# Patient Record
Sex: FEMALE | Race: WHITE | Employment: OTHER | URBAN - METROPOLITAN AREA
[De-identification: names, ages, dates, MRNs, and addresses within clinical notes are randomized per-mention and may not be internally consistent; named-entity substitution may affect disease eponyms.]

---

## 2017-01-05 ENCOUNTER — TELEPHONE (OUTPATIENT)
Dept: FAMILY MEDICINE CLINIC | Age: 77
End: 2017-01-05

## 2017-01-07 RX ORDER — CHOLESTYRAMINE 4 G/9G
1 POWDER, FOR SUSPENSION ORAL
Qty: 90 PACKET | Refills: 3 | Status: SHIPPED | OUTPATIENT
Start: 2017-01-07 | End: 2017-02-09 | Stop reason: ALTCHOICE

## 2017-01-09 ENCOUNTER — TELEPHONE (OUTPATIENT)
Dept: FAMILY MEDICINE CLINIC | Age: 77
End: 2017-01-09

## 2017-02-01 ENCOUNTER — TELEPHONE (OUTPATIENT)
Dept: FAMILY MEDICINE CLINIC | Age: 77
End: 2017-02-01

## 2017-02-02 ENCOUNTER — HOSPITAL ENCOUNTER (OUTPATIENT)
Dept: LAB | Age: 77
Discharge: HOME | End: 2017-02-02
Payer: MEDICARE

## 2017-02-02 DIAGNOSIS — E03.9 HYPOTHYROIDISM, UNSPECIFIED TYPE: ICD-10-CM

## 2017-02-02 DIAGNOSIS — E87.6 HYPOKALEMIA: ICD-10-CM

## 2017-02-02 LAB
ANION GAP SERPL CALCULATED.3IONS-SCNC: 10 MEQ/L (ref 7–13)
BUN BLDV-MCNC: 21 MG/DL (ref 8–23)
CALCIUM SERPL-MCNC: 10.2 MG/DL (ref 8.6–10.2)
CHLORIDE BLD-SCNC: 101 MEQ/L (ref 98–107)
CO2: 28 MEQ/L (ref 22–29)
CREAT SERPL-MCNC: 0.7 MG/DL (ref 0.5–0.9)
GFR AFRICAN AMERICAN: >60
GFR NON-AFRICAN AMERICAN: >60
GLUCOSE BLD-MCNC: 101 MG/DL (ref 74–109)
POTASSIUM SERPL-SCNC: 3.9 MEQ/L (ref 3.5–5.1)
SODIUM BLD-SCNC: 139 MEQ/L (ref 132–144)
TSH SERPL DL<=0.05 MIU/L-ACNC: 4.86 UIU/ML (ref 0.27–4.2)

## 2017-02-02 PROCEDURE — 36415 COLL VENOUS BLD VENIPUNCTURE: CPT

## 2017-02-02 PROCEDURE — 84443 ASSAY THYROID STIM HORMONE: CPT

## 2017-02-02 PROCEDURE — 80048 BASIC METABOLIC PNL TOTAL CA: CPT

## 2017-02-03 DIAGNOSIS — E03.9 HYPOTHYROIDISM, UNSPECIFIED TYPE: Primary | Chronic | ICD-10-CM

## 2017-02-03 RX ORDER — LEVOTHYROXINE SODIUM 0.05 MG/1
50 TABLET ORAL DAILY
Qty: 30 TABLET | Refills: 3 | Status: SHIPPED | OUTPATIENT
Start: 2017-02-03 | End: 2017-06-05 | Stop reason: SDUPTHER

## 2017-02-09 ENCOUNTER — OFFICE VISIT (OUTPATIENT)
Dept: FAMILY MEDICINE CLINIC | Age: 77
End: 2017-02-09

## 2017-02-09 VITALS
DIASTOLIC BLOOD PRESSURE: 80 MMHG | RESPIRATION RATE: 18 BRPM | HEART RATE: 112 BPM | HEIGHT: 61 IN | SYSTOLIC BLOOD PRESSURE: 132 MMHG | TEMPERATURE: 98.1 F | OXYGEN SATURATION: 98 %

## 2017-02-09 DIAGNOSIS — Z12.31 VISIT FOR SCREENING MAMMOGRAM: ICD-10-CM

## 2017-02-09 DIAGNOSIS — Z78.0 POSTMENOPAUSAL: ICD-10-CM

## 2017-02-09 DIAGNOSIS — K21.9 GASTROESOPHAGEAL REFLUX DISEASE, ESOPHAGITIS PRESENCE NOT SPECIFIED: Chronic | ICD-10-CM

## 2017-02-09 DIAGNOSIS — E03.9 HYPOTHYROIDISM, UNSPECIFIED TYPE: Chronic | ICD-10-CM

## 2017-02-09 DIAGNOSIS — I10 ESSENTIAL HYPERTENSION: Primary | Chronic | ICD-10-CM

## 2017-02-09 DIAGNOSIS — G47.00 INSOMNIA, UNSPECIFIED TYPE: Chronic | ICD-10-CM

## 2017-02-09 DIAGNOSIS — Z13.820 SCREENING FOR OSTEOPOROSIS: ICD-10-CM

## 2017-02-09 DIAGNOSIS — M15.9 GENERALIZED OSTEOARTHRITIS: Chronic | ICD-10-CM

## 2017-02-09 DIAGNOSIS — E78.5 HYPERLIPIDEMIA, UNSPECIFIED HYPERLIPIDEMIA TYPE: Chronic | ICD-10-CM

## 2017-02-09 PROCEDURE — 99214 OFFICE O/P EST MOD 30 MIN: CPT | Performed by: FAMILY MEDICINE

## 2017-02-09 PROCEDURE — 3288F FALL RISK ASSESSMENT DOCD: CPT | Performed by: FAMILY MEDICINE

## 2017-02-09 PROCEDURE — G8510 SCR DEP NEG, NO PLAN REQD: HCPCS | Performed by: FAMILY MEDICINE

## 2017-02-09 RX ORDER — CYCLOBENZAPRINE HCL 10 MG
TABLET ORAL
Refills: 0 | COMMUNITY
Start: 2017-01-26 | End: 2017-08-02

## 2017-02-09 RX ORDER — MIRTAZAPINE 7.5 MG/1
7.5 TABLET, FILM COATED ORAL NIGHTLY
Qty: 30 TABLET | Refills: 3 | Status: SHIPPED | OUTPATIENT
Start: 2017-02-09 | End: 2017-08-02

## 2017-02-09 ASSESSMENT — PATIENT HEALTH QUESTIONNAIRE - PHQ9
SUM OF ALL RESPONSES TO PHQ QUESTIONS 1-9: 0
1. LITTLE INTEREST OR PLEASURE IN DOING THINGS: 0
2. FEELING DOWN, DEPRESSED OR HOPELESS: 0
SUM OF ALL RESPONSES TO PHQ9 QUESTIONS 1 & 2: 0

## 2017-02-09 ASSESSMENT — ENCOUNTER SYMPTOMS
BLOOD IN STOOL: 0
COUGH: 0
ABDOMINAL PAIN: 0
VOMITING: 0
CHEST TIGHTNESS: 0
SHORTNESS OF BREATH: 0
NAUSEA: 0
WHEEZING: 0
DIARRHEA: 0

## 2017-03-31 ENCOUNTER — HOSPITAL ENCOUNTER (OUTPATIENT)
Dept: LAB | Age: 77
Discharge: HOME | End: 2017-03-31
Payer: MEDICARE

## 2017-03-31 DIAGNOSIS — E78.5 HYPERLIPIDEMIA, UNSPECIFIED HYPERLIPIDEMIA TYPE: Chronic | ICD-10-CM

## 2017-03-31 DIAGNOSIS — E03.9 HYPOTHYROIDISM, UNSPECIFIED TYPE: Chronic | ICD-10-CM

## 2017-03-31 LAB
CHOLESTEROL, TOTAL: 232 MG/DL (ref 0–199)
HDLC SERPL-MCNC: 55 MG/DL (ref 40–59)
LDL CHOLESTEROL CALCULATED: 145 MG/DL (ref 0–129)
TRIGL SERPL-MCNC: 160 MG/DL (ref 0–200)
TSH SERPL DL<=0.05 MIU/L-ACNC: 3.15 UIU/ML (ref 0.27–4.2)

## 2017-03-31 PROCEDURE — 36415 COLL VENOUS BLD VENIPUNCTURE: CPT

## 2017-03-31 PROCEDURE — 80061 LIPID PANEL: CPT

## 2017-03-31 PROCEDURE — 84443 ASSAY THYROID STIM HORMONE: CPT

## 2017-06-05 DIAGNOSIS — E03.9 HYPOTHYROIDISM, UNSPECIFIED TYPE: Chronic | ICD-10-CM

## 2017-06-05 RX ORDER — LEVOTHYROXINE SODIUM 0.05 MG/1
50 TABLET ORAL DAILY
Qty: 90 TABLET | Refills: 3 | Status: SHIPPED | OUTPATIENT
Start: 2017-06-05 | End: 2018-06-22 | Stop reason: SDUPTHER

## 2017-07-07 ENCOUNTER — HOSPITAL ENCOUNTER (OUTPATIENT)
Dept: WOMENS IMAGING | Age: 77
Discharge: HOME | End: 2017-07-07
Payer: MEDICARE

## 2017-07-07 DIAGNOSIS — Z78.0 POSTMENOPAUSAL: ICD-10-CM

## 2017-07-07 DIAGNOSIS — Z13.820 SCREENING FOR OSTEOPOROSIS: ICD-10-CM

## 2017-07-07 DIAGNOSIS — Z12.31 VISIT FOR SCREENING MAMMOGRAM: ICD-10-CM

## 2017-07-07 PROCEDURE — 77080 DXA BONE DENSITY AXIAL: CPT

## 2017-07-07 PROCEDURE — G0202 SCR MAMMO BI INCL CAD: HCPCS

## 2017-07-09 DIAGNOSIS — R92.8 ABNORMALITY OF RIGHT BREAST ON SCREENING MAMMOGRAM: Primary | ICD-10-CM

## 2017-07-13 ENCOUNTER — TELEPHONE (OUTPATIENT)
Dept: FAMILY MEDICINE CLINIC | Age: 77
End: 2017-07-13

## 2017-07-17 ENCOUNTER — HOSPITAL ENCOUNTER (OUTPATIENT)
Dept: ULTRASOUND IMAGING | Age: 77
Discharge: HOME | End: 2017-07-17
Payer: MEDICARE

## 2017-07-17 ENCOUNTER — HOSPITAL ENCOUNTER (OUTPATIENT)
Dept: WOMENS IMAGING | Age: 77
Discharge: HOME | End: 2017-07-17
Payer: MEDICARE

## 2017-07-17 DIAGNOSIS — R92.8 ABNORMALITY OF RIGHT BREAST ON SCREENING MAMMOGRAM: ICD-10-CM

## 2017-07-17 PROCEDURE — 76642 ULTRASOUND BREAST LIMITED: CPT

## 2017-07-17 PROCEDURE — G0206 DX MAMMO INCL CAD UNI: HCPCS

## 2017-07-18 DIAGNOSIS — N63.10 MASS OF RIGHT BREAST ON MAMMOGRAM: Primary | ICD-10-CM

## 2017-08-02 ENCOUNTER — OFFICE VISIT (OUTPATIENT)
Dept: SURGERY | Age: 77
End: 2017-08-02

## 2017-08-02 VITALS
WEIGHT: 195 LBS | BODY MASS INDEX: 35.88 KG/M2 | DIASTOLIC BLOOD PRESSURE: 82 MMHG | SYSTOLIC BLOOD PRESSURE: 140 MMHG | HEIGHT: 62 IN | TEMPERATURE: 98.1 F

## 2017-08-02 DIAGNOSIS — R92.8 ABNORMAL MAMMOGRAM: ICD-10-CM

## 2017-08-02 DIAGNOSIS — R92.8 ABNORMAL ULTRASOUND OF BREAST: Primary | ICD-10-CM

## 2017-08-02 PROCEDURE — 99213 OFFICE O/P EST LOW 20 MIN: CPT | Performed by: SURGERY

## 2017-08-02 RX ORDER — MELOXICAM 15 MG/1
TABLET ORAL
Refills: 0 | COMMUNITY
Start: 2017-07-18 | End: 2017-12-18 | Stop reason: ALTCHOICE

## 2017-08-02 RX ORDER — OXYCODONE HYDROCHLORIDE AND ACETAMINOPHEN 5; 325 MG/1; MG/1
TABLET ORAL
Refills: 0 | COMMUNITY
Start: 2017-07-06 | End: 2018-04-19 | Stop reason: ALTCHOICE

## 2017-08-10 ENCOUNTER — PREP FOR PROCEDURE (OUTPATIENT)
Dept: WOMENS IMAGING | Age: 77
End: 2017-08-10

## 2017-08-10 ENCOUNTER — HOSPITAL ENCOUNTER (OUTPATIENT)
Dept: WOMENS IMAGING | Age: 77
Discharge: HOME | End: 2017-08-10
Payer: MEDICARE

## 2017-08-10 ENCOUNTER — HOSPITAL ENCOUNTER (OUTPATIENT)
Dept: ULTRASOUND IMAGING | Age: 77
Discharge: HOME | End: 2017-08-10
Payer: MEDICARE

## 2017-08-10 VITALS
RESPIRATION RATE: 16 BRPM | HEART RATE: 96 BPM | WEIGHT: 195 LBS | SYSTOLIC BLOOD PRESSURE: 162 MMHG | HEIGHT: 62 IN | OXYGEN SATURATION: 96 % | DIASTOLIC BLOOD PRESSURE: 90 MMHG | BODY MASS INDEX: 35.88 KG/M2

## 2017-08-10 DIAGNOSIS — R92.8 ABNORMAL ULTRASOUND OF BREAST: ICD-10-CM

## 2017-08-10 PROCEDURE — G0206 DX MAMMO INCL CAD UNI: HCPCS

## 2017-08-10 PROCEDURE — 2580000003 HC RX 258: Performed by: SURGERY

## 2017-08-10 PROCEDURE — 88305 TISSUE EXAM BY PATHOLOGIST: CPT

## 2017-08-10 PROCEDURE — 2500000003 HC RX 250 WO HCPCS: Performed by: SURGERY

## 2017-08-10 PROCEDURE — 19083 BX BREAST 1ST LESION US IMAG: CPT | Performed by: SURGERY

## 2017-08-10 PROCEDURE — 19083 BX BREAST 1ST LESION US IMAG: CPT

## 2017-08-10 RX ORDER — SODIUM CHLORIDE 9 MG/ML
INJECTION, SOLUTION INTRAVENOUS CONTINUOUS
Status: CANCELLED | OUTPATIENT
Start: 2017-08-10

## 2017-08-10 RX ORDER — LIDOCAINE HYDROCHLORIDE 20 MG/ML
20 INJECTION, SOLUTION INFILTRATION; PERINEURAL ONCE
Status: CANCELLED | OUTPATIENT
Start: 2017-08-10 | End: 2017-08-10

## 2017-08-10 RX ORDER — SODIUM CHLORIDE 9 MG/ML
INJECTION, SOLUTION INTRAVENOUS CONTINUOUS
Status: DISCONTINUED | OUTPATIENT
Start: 2017-08-10 | End: 2017-08-13 | Stop reason: HOSPADM

## 2017-08-10 RX ORDER — LIDOCAINE HYDROCHLORIDE 20 MG/ML
20 INJECTION, SOLUTION INFILTRATION; PERINEURAL ONCE
Status: COMPLETED | OUTPATIENT
Start: 2017-08-10 | End: 2017-08-10

## 2017-08-10 RX ADMIN — SODIUM CHLORIDE 250 ML: 9 INJECTION, SOLUTION INTRAVENOUS at 14:11

## 2017-08-10 RX ADMIN — SODIUM CHLORIDE 250 ML: 9 INJECTION, SOLUTION INTRAVENOUS at 14:00

## 2017-08-10 RX ADMIN — LIDOCAINE HYDROCHLORIDE 10 ML: 20 INJECTION, SOLUTION INFILTRATION; PERINEURAL at 14:11

## 2017-08-10 ASSESSMENT — PAIN DESCRIPTION - DESCRIPTORS
DESCRIPTORS: ACHING
DESCRIPTORS: ACHING

## 2017-08-10 ASSESSMENT — PAIN DESCRIPTION - PAIN TYPE: TYPE: CHRONIC PAIN

## 2017-08-10 ASSESSMENT — PAIN DESCRIPTION - LOCATION: LOCATION: KNEE

## 2017-08-10 ASSESSMENT — PAIN DESCRIPTION - ORIENTATION: ORIENTATION: RIGHT

## 2017-08-10 ASSESSMENT — PAIN - FUNCTIONAL ASSESSMENT: PAIN_FUNCTIONAL_ASSESSMENT: 0-10

## 2017-08-10 ASSESSMENT — PAIN DESCRIPTION - ONSET: ONSET: ON-GOING

## 2017-08-10 ASSESSMENT — PAIN SCALES - GENERAL: PAINLEVEL_OUTOF10: 3

## 2017-08-10 ASSESSMENT — PAIN DESCRIPTION - PROGRESSION: CLINICAL_PROGRESSION: NOT CHANGED

## 2017-08-10 ASSESSMENT — PAIN DESCRIPTION - FREQUENCY: FREQUENCY: INTERMITTENT

## 2017-08-18 DIAGNOSIS — R92.8 ABNORMAL ULTRASOUND OF BREAST: Primary | ICD-10-CM

## 2017-08-18 DIAGNOSIS — R92.8 ABNORMAL MAMMOGRAM: ICD-10-CM

## 2017-09-12 ENCOUNTER — HOSPITAL ENCOUNTER (OUTPATIENT)
Dept: PHYSICAL THERAPY | Age: 77
Setting detail: THERAPIES SERIES
Discharge: HOME | End: 2017-09-12
Payer: MEDICARE

## 2017-09-12 PROCEDURE — G8978 MOBILITY CURRENT STATUS: HCPCS

## 2017-09-12 PROCEDURE — 97162 PT EVAL MOD COMPLEX 30 MIN: CPT

## 2017-09-12 PROCEDURE — G8979 MOBILITY GOAL STATUS: HCPCS

## 2017-09-12 ASSESSMENT — PAIN DESCRIPTION - PROGRESSION: CLINICAL_PROGRESSION: GRADUALLY WORSENING

## 2017-09-12 ASSESSMENT — PAIN DESCRIPTION - FREQUENCY: FREQUENCY: INTERMITTENT

## 2017-09-12 ASSESSMENT — PAIN DESCRIPTION - ORIENTATION: ORIENTATION: LEFT;RIGHT

## 2017-09-12 ASSESSMENT — PAIN SCALES - GENERAL: PAINLEVEL_OUTOF10: 8

## 2017-09-12 ASSESSMENT — PAIN DESCRIPTION - DESCRIPTORS: DESCRIPTORS: ACHING

## 2017-09-12 ASSESSMENT — PAIN DESCRIPTION - LOCATION: LOCATION: BACK;KNEE

## 2017-09-12 ASSESSMENT — PAIN DESCRIPTION - PAIN TYPE: TYPE: CHRONIC PAIN

## 2017-09-14 ENCOUNTER — HOSPITAL ENCOUNTER (OUTPATIENT)
Dept: PHYSICAL THERAPY | Age: 77
Setting detail: THERAPIES SERIES
Discharge: HOME | End: 2017-09-14
Payer: MEDICARE

## 2017-09-14 PROCEDURE — 97113 AQUATIC THERAPY/EXERCISES: CPT

## 2017-09-14 ASSESSMENT — PAIN DESCRIPTION - DESCRIPTORS: DESCRIPTORS: ACHING

## 2017-09-14 ASSESSMENT — PAIN DESCRIPTION - LOCATION: LOCATION: LEG;KNEE

## 2017-09-14 ASSESSMENT — PAIN DESCRIPTION - ORIENTATION: ORIENTATION: RIGHT;LEFT

## 2017-09-14 ASSESSMENT — PAIN SCALES - GENERAL: PAINLEVEL_OUTOF10: 6

## 2017-09-14 ASSESSMENT — PAIN DESCRIPTION - PAIN TYPE: TYPE: CHRONIC PAIN

## 2017-09-19 ENCOUNTER — HOSPITAL ENCOUNTER (OUTPATIENT)
Dept: PHYSICAL THERAPY | Age: 77
Setting detail: THERAPIES SERIES
Discharge: HOME | End: 2017-09-19
Payer: MEDICARE

## 2017-09-19 PROCEDURE — 97113 AQUATIC THERAPY/EXERCISES: CPT

## 2017-09-19 ASSESSMENT — PAIN DESCRIPTION - ORIENTATION: ORIENTATION: LOWER;LEFT

## 2017-09-19 ASSESSMENT — PAIN DESCRIPTION - DESCRIPTORS: DESCRIPTORS: ACHING

## 2017-09-19 ASSESSMENT — PAIN DESCRIPTION - LOCATION: LOCATION: BACK;LEG

## 2017-09-19 ASSESSMENT — PAIN SCALES - GENERAL: PAINLEVEL_OUTOF10: 7

## 2017-09-21 ENCOUNTER — HOSPITAL ENCOUNTER (OUTPATIENT)
Dept: PHYSICAL THERAPY | Age: 77
Setting detail: THERAPIES SERIES
Discharge: HOME | End: 2017-09-21
Payer: MEDICARE

## 2017-09-21 PROCEDURE — 97113 AQUATIC THERAPY/EXERCISES: CPT

## 2017-09-21 ASSESSMENT — PAIN DESCRIPTION - LOCATION: LOCATION: BACK

## 2017-09-21 ASSESSMENT — PAIN DESCRIPTION - PAIN TYPE: TYPE: CHRONIC PAIN

## 2017-09-21 ASSESSMENT — PAIN DESCRIPTION - ORIENTATION: ORIENTATION: LOWER;LEFT

## 2017-09-21 ASSESSMENT — PAIN DESCRIPTION - FREQUENCY: FREQUENCY: INTERMITTENT

## 2017-09-21 ASSESSMENT — PAIN DESCRIPTION - DESCRIPTORS: DESCRIPTORS: ACHING

## 2017-09-21 ASSESSMENT — PAIN SCALES - GENERAL: PAINLEVEL_OUTOF10: 4

## 2017-09-26 ENCOUNTER — HOSPITAL ENCOUNTER (OUTPATIENT)
Dept: PHYSICAL THERAPY | Age: 77
Setting detail: THERAPIES SERIES
Discharge: HOME | End: 2017-09-26
Payer: MEDICARE

## 2017-09-26 PROCEDURE — 97113 AQUATIC THERAPY/EXERCISES: CPT

## 2017-09-26 ASSESSMENT — PAIN DESCRIPTION - ORIENTATION: ORIENTATION: RIGHT;LEFT

## 2017-09-26 ASSESSMENT — PAIN DESCRIPTION - PAIN TYPE: TYPE: CHRONIC PAIN

## 2017-09-26 ASSESSMENT — PAIN DESCRIPTION - LOCATION: LOCATION: LEG

## 2017-09-26 ASSESSMENT — PAIN SCALES - GENERAL: PAINLEVEL_OUTOF10: 6

## 2017-09-29 ENCOUNTER — HOSPITAL ENCOUNTER (OUTPATIENT)
Dept: PHYSICAL THERAPY | Age: 77
Setting detail: THERAPIES SERIES
Discharge: HOME | End: 2017-09-29
Payer: MEDICARE

## 2017-09-29 PROCEDURE — 97113 AQUATIC THERAPY/EXERCISES: CPT

## 2017-09-29 ASSESSMENT — PAIN DESCRIPTION - LOCATION: LOCATION: LEG;BACK

## 2017-09-29 ASSESSMENT — PAIN DESCRIPTION - DESCRIPTORS: DESCRIPTORS: ACHING

## 2017-09-29 ASSESSMENT — PAIN DESCRIPTION - FREQUENCY: FREQUENCY: INTERMITTENT

## 2017-09-29 ASSESSMENT — PAIN DESCRIPTION - ORIENTATION: ORIENTATION: LEFT;RIGHT;LOWER

## 2017-09-29 ASSESSMENT — PAIN DESCRIPTION - PAIN TYPE: TYPE: CHRONIC PAIN

## 2017-09-29 ASSESSMENT — PAIN SCALES - GENERAL: PAINLEVEL_OUTOF10: 6

## 2017-10-03 ENCOUNTER — HOSPITAL ENCOUNTER (OUTPATIENT)
Dept: PHYSICAL THERAPY | Age: 77
Setting detail: THERAPIES SERIES
Discharge: HOME | End: 2017-10-03
Payer: MEDICARE

## 2017-10-03 PROCEDURE — 97113 AQUATIC THERAPY/EXERCISES: CPT

## 2017-10-03 ASSESSMENT — PAIN DESCRIPTION - LOCATION: LOCATION: LEG;KNEE

## 2017-10-03 ASSESSMENT — PAIN DESCRIPTION - DESCRIPTORS: DESCRIPTORS: ACHING

## 2017-10-03 ASSESSMENT — PAIN DESCRIPTION - PAIN TYPE: TYPE: CHRONIC PAIN

## 2017-10-03 ASSESSMENT — PAIN DESCRIPTION - ORIENTATION: ORIENTATION: LEFT;RIGHT

## 2017-10-03 ASSESSMENT — PAIN SCALES - GENERAL: PAINLEVEL_OUTOF10: 6

## 2017-10-05 ENCOUNTER — HOSPITAL ENCOUNTER (OUTPATIENT)
Dept: PHYSICAL THERAPY | Age: 77
Setting detail: THERAPIES SERIES
Discharge: HOME | End: 2017-10-05
Payer: MEDICARE

## 2017-10-05 PROCEDURE — 97113 AQUATIC THERAPY/EXERCISES: CPT

## 2017-10-05 ASSESSMENT — PAIN DESCRIPTION - LOCATION: LOCATION: KNEE

## 2017-10-05 ASSESSMENT — PAIN DESCRIPTION - PAIN TYPE: TYPE: CHRONIC PAIN

## 2017-10-05 ASSESSMENT — PAIN DESCRIPTION - ORIENTATION: ORIENTATION: LEFT;RIGHT

## 2017-10-05 ASSESSMENT — PAIN SCALES - GENERAL: PAINLEVEL_OUTOF10: 2

## 2017-10-05 NOTE — PROGRESS NOTES
[address] [address]  Outpatient Physical Therapy    Treatment Note  Date: 10/5/2017  Patient: Angel Meyer  : 1940  ACCT #: [de-identified]  Referring Practitioner: Dr Sachin Pelaez   Diagnosis: L knee pain, LBP    Visit Information:  PT Visit Information  Onset Date: 17  PT Insurance Information: [employer] Medicare  Total # of Visits to Date: 8  Plan of Care/Certification Expiration Date: 17  No Show: 0  Canceled Appointment: 0  Progress Note Counter: 8/10    Subjective: Pt states \"I've had a good morning. \" Pt reports walking around [employer] ~20 min prior to PT appt. Pt expresses not having any pain w/ using shopping cart as AD. HEP Compliance:  [x] Good [] Fair [] Poor [] Reports not doing due to:    Vital Signs  Patient Currently in Pain: Yes   Pain Screening  Patient Currently in Pain: Yes  Pain Assessment  Pain Level: 2 (2-3)  Pain Type: Chronic pain  Pain Location: Knee  Pain Orientation: Left;Right    OBJECTIVE:   Exercises  Exercise 1: See paper PRE for ex's. To be scanned to chart upon discharge. Strength: [x] NT  [] MMT completed:     ROM: [x] NT  [] ROM measurements:    Assessment: Body structures, Functions, Activity limitations: Decreased functional mobility , Decreased ADL status, Decreased ROM, Decreased strength, Decreased endurance, Decreased balance  Assessment: Good antonette to tx this date w/ pain decreaseing to 2/10 in pool. Pt expressing only discomfort during reverse hip circles on Lt. Pt encouraged to decrease range to smaller circles for improved antonette. Pt requested to perform DB hang w/o DB's and instead holding onto ladder. Treatment Diagnosis: LBP and L > R knee pain with strength and ROM deficits  Prognosis: Good     Goals:  Short term goals  Time Frame for Short term goals: 2 weeks  Short term goal 1: Decrease pain 50% to assist with improved functional gains. Short term goal 2: Initiate written HEP for symptom management.   Long term goals  Time Frame for

## 2017-10-10 ENCOUNTER — HOSPITAL ENCOUNTER (OUTPATIENT)
Dept: PHYSICAL THERAPY | Age: 77
Setting detail: THERAPIES SERIES
Discharge: HOME | End: 2017-10-10
Payer: MEDICARE

## 2017-10-10 PROCEDURE — 97113 AQUATIC THERAPY/EXERCISES: CPT

## 2017-10-10 ASSESSMENT — PAIN SCALES - GENERAL: PAINLEVEL_OUTOF10: 5

## 2017-10-10 ASSESSMENT — PAIN DESCRIPTION - DESCRIPTORS: DESCRIPTORS: ACHING

## 2017-10-10 ASSESSMENT — PAIN DESCRIPTION - ORIENTATION: ORIENTATION: LEFT;RIGHT

## 2017-10-10 ASSESSMENT — PAIN DESCRIPTION - PAIN TYPE: TYPE: CHRONIC PAIN

## 2017-10-10 ASSESSMENT — PAIN DESCRIPTION - LOCATION: LOCATION: KNEE

## 2017-10-12 ENCOUNTER — HOSPITAL ENCOUNTER (OUTPATIENT)
Dept: PHYSICAL THERAPY | Age: 77
Setting detail: THERAPIES SERIES
Discharge: HOME | End: 2017-10-12
Payer: MEDICARE

## 2017-10-12 PROCEDURE — G8979 MOBILITY GOAL STATUS: HCPCS

## 2017-10-12 PROCEDURE — 97113 AQUATIC THERAPY/EXERCISES: CPT

## 2017-10-12 PROCEDURE — G8980 MOBILITY D/C STATUS: HCPCS

## 2017-10-12 PROCEDURE — 97110 THERAPEUTIC EXERCISES: CPT

## 2017-10-12 NOTE — PROGRESS NOTES
[address] [address]  Outpatient Physical Therapy    Treatment Note        Date: 10/12/2017  Patient: Ángel Shipman  : 1940  ACCT #: [de-identified]  Referring Practitioner: Dr Stephanie Alvarado   Diagnosis: L knee pain, LBP    Visit Information:  PT Visit Information  Onset Date: 17  PT Insurance Information: [employer] Medicare  Total # of Visits to Date: 10  Plan of Care/Certification Expiration Date: 17  No Show: 0  Canceled Appointment: 0  Progress Note Counter: 10/10    Subjective: Pt reports 6-7/10 pain in L knee, 4-5/10 pain in R knee, and 6/10 pain in LB and L buttock. Pt reports only being able to walk w/ cane or cart when ambulating in home or community and must go at a slowed pace and \"cannot go very far. \" Pt reports she in unable to don L sock and must use a sock aid. pt expressing \"my left knee only seems to be getting worse. \"     HEP Compliance:  [x] Good [] Fair [] Poor [] Reports not doing due to:    OBJECTIVE:   Exercises  Exercise 1: See paper PRE for ex's. To be scanned to chart upon discharge. Exercise 2: Objective measurements: 20 minutes    Strength: [] NT  [x] MMT completed:  Strength RLE  Comment: Ankle: 5/5, Knee: 4+/5, Hip: flex 4-/5, ext 4-/5 (standing) IR/ER unable to antonette d/t increased pain  Strength LLE  Comment: Ankle: 5/5 Knee: 4+/5 Hip: flex: 4/5, ext 4-/5 (standing) IR/ER unable to antonette d/t increased pain     ROM: [] NT  [x] ROM measurements:  Spine  Lumbar: Flex WNL no pain, Ext neutral only w/ no pain, SB limited to patella b/l w/ no increase in pain    *Indicates exercise, modality, or manual techniques to be initiated when appropriate    Assessment: Body structures, Functions, Activity limitations: Decreased functional mobility , Decreased ADL status, Decreased ROM, Decreased strength, Decreased endurance, Decreased balance  Assessment: despite increase in strength in b/l LEs, pt reporting increased pain in b/l LEs, L buttock, and LB.  Discussed pt returning to MD to discuss any further tx options d/t increasing pain and decreased mobility. Treatment Diagnosis: LBP and L > R knee pain with strength and ROM deficits  Prognosis: Good     Goals:  Short term goals  Time Frame for Short term goals: 2 weeks  Short term goal 1: Decrease pain 50% to assist with improved functional gains. Short term goal 2: Initiate written HEP for symptom management. Long term goals  Time Frame for Long term goals : 4 weeks  Long term goal 1: Decrease Lumbar/ B knee pain to  <2/10 to enable functional improvements in ADL's. Long term goal 2: Pain-free  AROM to [facility] Lumbar ext allowing an increase in ADL tolerance. Long term goal 3: Improve  strength B LE strength 3+ to 4-/5 to allow patient to report greater than 50% normal function per functional survey score. Long term goal 4: Pt will amb all surfaces short community distances safe/Indep with least restrictive device and minimal deviations  Progress toward goals: Pt has not met the above goals d/t increased pain    POST-PAIN       Pain Rating (0-10 pain scale):   Same as above/10   Location and pain description same as pre-treatment unless indicated. Action: [] NA   [x] Perform HEP  [] Meds as prescribed  [x] Modalities as prescribed   [x] Call Physician     Frequency/Duration:  Plan  Plan Comment: D/C PT     Pt to continue current HEP. See objective section for any therapeutic exercise changes, additions or modifications this date.     PT Individual Minutes  Time In: 1300  Time Out: 1350  Minutes: 50  Timed Code Treatment Minutes: 50 Minutes  Procedure Minutes:0    Signature:  Electronically signed by Cody Hawley PTA on 10/12/17 at 7:31 PM

## 2017-10-24 ENCOUNTER — HOSPITAL ENCOUNTER (OUTPATIENT)
Dept: OCCUPATIONAL THERAPY | Age: 77
Setting detail: THERAPIES SERIES
Discharge: HOME | End: 2017-10-24
Payer: MEDICARE

## 2017-10-24 PROCEDURE — G8990 OTHER PT/OT CURRENT STATUS: HCPCS

## 2017-10-24 PROCEDURE — G8991 OTHER PT/OT GOAL STATUS: HCPCS

## 2017-10-24 PROCEDURE — 97166 OT EVAL MOD COMPLEX 45 MIN: CPT

## 2017-10-24 NOTE — PROGRESS NOTES
[facility] CHRONIC PAIN EVALUATION                                                                                                                   DATE: 10/24/2017                                                                   PHYSICIAN: Saniya Benitez                                                                      PATIENT NAME; Cassia Read                                              DIAGNOSIS: L knee and thigh pain and Lumbar DDD and associated pain    MRN: [id number]    ACCOUNT#: [de-identified]                                                     HDD:5/53/5484                     (79 y.o.)                          MEDICATIONS:TRimterene HCL/BP drug,Levothyroxine 50Mcg thyroid, Meloxicam 15mg, Didofenac Sodium topical gel    GENDER:  female                PRECAUTIONS:        None noted                                                       HAND DOMINANCE: L handed    PRIOR LEVEL OF FUNCTION:        Independent with a great deal of extra time required for daily activities                               DATE OF SX OR INJURY multiple painful sites and postural holding patterns    PREVIOUS/CURRENT TREATMENT FOR SAME PROBLEM:    [x]   YES []     NO Recently had PT at [address] [place] [address] office and water therapy that increased vs decreased pain  CHIEF COMPLAINT: L knee pain, back pain, and standing tolerance of 5-7 min intervals. Pain in the thigh muscles      PERTINENT MEDICAL HISTORY: Pt reports 17 year history of arthritis and L TKR 8-9 years ago, 2 hernia operations with mesh in place approx 4-5 years. 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 . Pt had skin ulcer with MRSA and had IV antibiotics with successful resolution.  Lumbar DDD      SOCIAL SUPPORT: Pt  Lives at home  who has COPD      ADL/HOBBIES/ROUTINES: Pt is Independent with ADLs with extra victoria so pain is the primary issue vs self care. Pt's  does laundry secondary to Pt's pain level and need to use B UE support to safely go up and down stairs. Pt gave up bowling because of LE pain. Pt has \"card group\" and \"second [address] with [address]      FALLS: see falls risk assessment form      PAIN SCALE:                       5  /10             Pt takes percocet at night         LOCATION: L hip , knee and thigh      POSTURAL EXAM/COMPENSATION: Pt cannot stand without cane support the patient tends to furniture walking by using hand support . Pt very unsteady on feet  Pt demonstrates an anterior pelvic tilt bilaterally and approximately a35 degree angulation of the R knee which has not had the TKR . L TKR noted and has better alignment bu t currently increased pain. Pt noted to stand with primary weight on the R LE even with the extreme angulation. LUMBAR/SACROILIAC ASSESSMENT: severe B anterior pelvic tilt with R to L obliquity present both in supine and standing. Pain in posterior L moe/SI area      LEG LENGTH COMPARISON: Pt stands primarily on the R LE and in supine Pt noted to have L leg length discrepancy of approx 1/2\"      NEUROLOGICAL ASSESSMENT: WNL      SOFT TISSUE ASSESSMENT/MOBILITY: Pt has decreased soft tissue mobility throughout the anterior and posterior lumbar area. Soft tissue appears to have poor mobility in all directions      SCAR/LOCATION/MOBILITY hernia scar is deeply imbedded with decreased mobility in all directions . L TKR scar appears mobile      COMMENTS/CLINICAL IMPRESSIONS: Pt presents with a long history of OA and poor joint mobility especially of B knees and hips. Pt currently does \"furniture walk\" and has difficulty changing directions and currently uses a straight cane for mobility.  Pt does not readily recruit her postural core muscles for mobility and position changes and substitutes pulling with her arms and hands for surface mobility and positional changes in standing. R knee demonstrate severe genu valgus and Pt prefers to stand on that R leg as her L leg is 1/2' shorter functionally and likely due to severe fascial restrictions throughout LE and Hips. Pt demonstrates poor standing tolerance of 3-5 min vs her reported 5-7 min and this is not functional for daily tasks. TX PLAN: ___2__ X WEEK X __5___WEEKS OR #______10__VISITS. Complexity:       []  Low Complexity  ¨ History: Brief history including review of medical records relating to the problem  ¨ Exam: 1-3 performance Deficits  ¨ Assistance/Modification: No assistance or modifications required to perform tasks. No comorbities affecting occupational performance  [x]  Medium Complexity  ¨ History: Expanded review of medical records and additional review of physical, cognitive, or psychosocial history related to current functional performance  ¨ Exam: 3-5 performance deficits  ¨ Assistance/Modification: Min/mod assistance or modifications required to perform tasks. May have comorbidities that affect occupational performance. []  High Complexity  ¨ History: Extensive review of medical records and additional review of physical, cognitive, or psychosocial history related to current functional performance. ¨ Exam: 5 or more performance deficits  ¨ Assistance/Modification: Significant assistance or modifications required to perform tasks. Have comorbidities that affect occupational performance. LONG TERM GOALS:          1. Pt will no longer substitute hands to surfaces for mobility and balance . Pt will  Not \"furniture walk\"      2. Pt will stand for 10 min intervals prior to resting using good postural awareness and good body mechanics for personal and household tasks      3. Redcuce /eliminate functional leg length discrepancy  by reducing moderate to severe fascial restirctions  of L LE.      4. Pt will recruit neutral pelvis  to support upright standing following reduction of severe fascial restrictions. 5. Pt will follow established HEP once established          PROBLEMS;    [x]  PAIN                                             [x]   IMPAIRED SKIN/TISSUE MOBILITY               []  EDEMA                                         []    IMP AIRED SENSATION    [x]  IMPAIRED ROM                           [x]   DECREASED STRENGTH core strength     [x]  IMPAIRED FLEXIBILITY              []    IMPAIRED COORDINATION/DEXTERITY    [x]  DECREASED KNOWLEDGE       []  DECREASED USE ___UE FOR ADL/WORK         HEP                                                     ROLE PERFORMANCE    [x]    OTHER Poor standing tolerance for ADL'S, and house hold tasks, decreased postural awareness, poor mobility endurance for functional distances    TREATMENT PLAN:    []    Re-evaluation                       [x]  PROM/stretching/AAROM/AROM    [x]    Instruction written HEP         []  Desensitization    [x]    MFR techniques                   [x]  Strengthening/Graded therapeutic UE    [x]  Pain Mgmt. Tech. Activity core strength  []  Edema mgmt. Tech. []  Coordination/Dexterity retraining    [x]  Scar Mgmt. [x]  Modalities infared  []  Work Simulation Activity         []  ADL Retraining    [x]  Instruction/application of        Energy conservation, work        Simplification, joint protection,        G CODES:Current__8990 CJ____        Body mechanics                                                Goal   ____8991CI__                                                                                                                                                      G Code:   OT G-codes  Functional Limitation: Other OT primary  Other OT Primary Current Status (): At least 20 percent but less than 40 percent impaired, limited or restricted  Other OT Primary Goal Status ():  At least 1 percent but less than 20 percent impaired, limited or restricted           [address] Name:  Mira Marcus  YOB: 1940 (68 y.o.)  Gender:  female  MRN:  [id number]  Account #: [de-identified]                         Age: 0-59 = 0          60-69= 1            > 70= 2 History of Falls:   0  Falls  last 6 mo = 0    1  fall  Last  6 mo = 1   1-3 falls last 6 mo = 2 Medical History:   Parkinsons, CVA,HTN, vertigo, >4 meds, use of assistive device (1pt.for each)  Mental Status:  A & O x 3 = 0  Disoriented to person, place, or time = 2     High Risk for Falls: >8  Intermediate Risk for Falls: 4-8   Low Risk for Falls: <4    [x]  INITIAL ASSESSMENT:                                                      Date: 10-24-17                                                          Age:   1                                                         Falls: 0                                                          PMH: 2                                                          Mental: 0                                                       Total:  3                                                       *Patient 4 or younger []   Vestibular []    Signature: CANDELARIO Daniels, 10/24/2017, 5:36 PM                                                  []   REASSESSMENT:    Date:                                                          Age: Falls:                                                          PMH:                                                           Mental:                                                      Total:                                                         *Patient 4 or younger []   Vestibular []    Signature:               THERAPY TIME INDIVIDUAL                                       TIME IN 1600                                      TIME OUT 1700                                       MINUTES 60

## 2017-10-27 ENCOUNTER — HOSPITAL ENCOUNTER (OUTPATIENT)
Dept: OCCUPATIONAL THERAPY | Age: 77
Setting detail: THERAPIES SERIES
Discharge: HOME | End: 2017-10-27
Payer: MEDICARE

## 2017-10-27 PROCEDURE — 97530 THERAPEUTIC ACTIVITIES: CPT

## 2017-10-27 PROCEDURE — 97140 MANUAL THERAPY 1/> REGIONS: CPT

## 2017-10-27 ASSESSMENT — PAIN DESCRIPTION - DESCRIPTORS: DESCRIPTORS: ACHING;BURNING;CONSTANT

## 2017-10-27 ASSESSMENT — PAIN SCALES - GENERAL: PAINLEVEL_OUTOF10: 7

## 2017-10-27 ASSESSMENT — PAIN DESCRIPTION - LOCATION: LOCATION: KNEE

## 2017-10-27 ASSESSMENT — PAIN DESCRIPTION - PAIN TYPE: TYPE: CHRONIC PAIN

## 2017-10-27 ASSESSMENT — PAIN DESCRIPTION - ORIENTATION: ORIENTATION: LEFT;RIGHT

## 2017-10-27 NOTE — PROGRESS NOTES
Occupational Therapy  Daily Treatment Note  Date: 10/27/2017  Patient Name: Laurel Felty  :  1940  MRN: [id number]       Subjective Patient here on time for treatment. Additional Pertinent Hx: Eval 10/24/17 PATRICE Keith  Referring Practitioner: Dr. Apurva Chandler  Diagnosis: L knee and thigh pain, lumbar DDD, and associated pain. Total # of Visits Approved: 10  Progress Note Counter: 1 after evaluation    Patient Currently in Pain: Yes  Pain Assessment: 0-10  Pain Level: 7  Pain Type: Chronic pain  Pain Location: Knee  Pain Orientation: Left;Right  Pain Descriptors: Aching;Burning;Constant    Treatment Activities:  Patient performed sit to supine for direct treatment of MFR techniques with min A. Patient moves slowly and with pain. Lower Extremities:                                [x]      Suprapatellar & Quadriceps          [x]  Hamstrings                                [x]      Upper Quads soft tissue mob. [x]  Leg pull left                                []       Bilateral Leg pull                           []  Soft tissue mobility                                                                                                [x]  Transverse Planes          Other exercises  Other exercises 1: HEP Posterior pelvic tilts. Other exercises 2: HEP sidelying sciatica stretch, leg off bed. Assessment Patient was unable to tolerate direct gentle hands on treatment. Patient began to moan and cry in pain. The direct treatment was then stopped and patient lied on the mat. Patient eventually was mod A for supine to sit on the edge of the mat and eventually the pain came down from a 10. Discussed the timing of pain meds prior to treatment to allow direct touch. As all treatment done today is very gentle, but the patient was unable to tolerate transverse plane releases to LE's.  Patient requested any stretching she could do so I demonstrated the above two HEP's of posterior pelvic tilts, and the sidelying stretch. Post Treatment Pain Screening  Pain at present: 7  Scale Used: Numeric Score  Intervention List: Pt educated regarding timing of pain meds  Comments / Details: Patient did not take any pain medication today prior to treatment. Educated patient to take prior to treatment to allow direct treatment. Plan Continue with current POC. G-Code 10th visits     Goals  Long term goals 1-5 addressed. Time Frame for Long term goals : 2X per week, 5 weeks or 10 visits. Long term goal 1: Patient will no longer substitute hands to surfaces for mobility and balance. Patient will not furniture walk. Long term goal 2: Patient will stand for 10 minute intervals prior to resting using good postural awareness and good body mechanics for personal and household tasks. Long term goal 3: Reduce/eliminate functional leg length discrepency by reducing moderate to severe fascial restrictions of L LE. Long term goal 4: Patient will recruit neutral pelvis to support upright standing following reduction of severe fascial restrictions. Long term goal 5: Patient will follow established HEP once established. Therapy Time Patient here for the full hour. Patient rested in seated position to let the pain subside.    Individual Concurrent Group Co-treatment   Time In  2:00 pm         Time Out  2:45pm         Minutes  [address]  Electronically signed by ARLIN Pierre on 10/27/17 at 4:39 PM

## 2017-11-01 ENCOUNTER — HOSPITAL ENCOUNTER (OUTPATIENT)
Dept: OCCUPATIONAL THERAPY | Age: 77
Setting detail: THERAPIES SERIES
Discharge: HOME | End: 2017-11-01
Payer: MEDICARE

## 2017-11-01 PROCEDURE — 97140 MANUAL THERAPY 1/> REGIONS: CPT

## 2017-11-01 ASSESSMENT — PAIN DESCRIPTION - LOCATION: LOCATION: KNEE;LEG

## 2017-11-01 ASSESSMENT — PAIN DESCRIPTION - ORIENTATION: ORIENTATION: RIGHT;LEFT

## 2017-11-01 ASSESSMENT — PAIN DESCRIPTION - PAIN TYPE: TYPE: CHRONIC PAIN

## 2017-11-01 ASSESSMENT — PAIN SCALES - GENERAL: PAINLEVEL_OUTOF10: 6

## 2017-11-03 ENCOUNTER — HOSPITAL ENCOUNTER (OUTPATIENT)
Dept: OCCUPATIONAL THERAPY | Age: 77
Setting detail: THERAPIES SERIES
Discharge: HOME | End: 2017-11-03
Payer: MEDICARE

## 2017-11-03 PROCEDURE — 97140 MANUAL THERAPY 1/> REGIONS: CPT

## 2017-11-03 PROCEDURE — 97110 THERAPEUTIC EXERCISES: CPT

## 2017-11-03 ASSESSMENT — PAIN DESCRIPTION - LOCATION: LOCATION: KNEE;LEG

## 2017-11-03 ASSESSMENT — PAIN DESCRIPTION - PAIN TYPE: TYPE: CHRONIC PAIN

## 2017-11-03 ASSESSMENT — PAIN SCALES - GENERAL: PAINLEVEL_OUTOF10: 5

## 2017-11-03 ASSESSMENT — PAIN DESCRIPTION - ORIENTATION: ORIENTATION: RIGHT;LEFT

## 2017-11-03 ASSESSMENT — PAIN DESCRIPTION - DESCRIPTORS: DESCRIPTORS: ACHING;BURNING

## 2017-11-08 ENCOUNTER — HOSPITAL ENCOUNTER (OUTPATIENT)
Dept: OCCUPATIONAL THERAPY | Age: 77
Setting detail: THERAPIES SERIES
Discharge: HOME | End: 2017-11-08
Payer: MEDICARE

## 2017-11-08 PROCEDURE — 97140 MANUAL THERAPY 1/> REGIONS: CPT

## 2017-11-08 ASSESSMENT — PAIN DESCRIPTION - LOCATION: LOCATION: KNEE;LEG

## 2017-11-08 ASSESSMENT — PAIN DESCRIPTION - PAIN TYPE: TYPE: CHRONIC PAIN

## 2017-11-08 ASSESSMENT — PAIN DESCRIPTION - ORIENTATION: ORIENTATION: LEFT

## 2017-11-08 ASSESSMENT — PAIN DESCRIPTION - DESCRIPTORS: DESCRIPTORS: ACHING;BURNING;CONSTANT

## 2017-11-08 ASSESSMENT — PAIN SCALES - GENERAL: PAINLEVEL_OUTOF10: 9

## 2017-11-08 NOTE — PROGRESS NOTES
Occupational Therapy  Daily Treatment Note  Date: 2017  Patient Name: Severiano Corona  :  1940  MRN: [id number]         Additional Pertinent Hx: Eval 10/24/17 PATRICE Gonsalez  Referring Practitioner: Dr. Jack Gonzalez  Diagnosis: L knee and thigh pain, lumbar DDD, and associated pain. Progress Note Counter: 4    Subjective: Patient reports having a procedure on Monday, 17 of injections into the left hip area of Dexamethasone Phosphate and Marcaine to the left SI joint and the left piriformis. Patient Currently in Pain: Yes  Pain Assessment: 0-10  Pain Level: 9  Pain Type: Chronic pain  Pain Location: Knee;Leg  Pain Orientation: Left  Pain Radiating Towards: Radiating down the left leg. Pain Descriptors: Aching;Burning;Constant    Treatment Activities:  Provided infrared heat treatment as noted below to the Left hip area followed by direct treatment of MFR techniques. Patient reported feeling like she had an upset stomach but declined ginger ale. Patient was placed on a wedge cushion in side lying which helped relieve her indigestion.                             MFR Techniques:                                           Lumbar/Pelvic/Sacral Area:                                []      Lumbo/sacral decompression        []  Pelvic floor transv.plane                                []      Psoas release                                []  Scar releases                                [x]      Lateral Obliques   Left                          []   Anterior pelvic tilts                                 [x]      Posterior Pelvic Tilts                      [x]  Lumbosacral junction                                 [x]      Dural tube release                                 Decompression                                 [x]      Piriformis Release Left                        [x]   Soft tissue mobility    Lower Extremities:                                [x]      Suprapatellar & Quadriceps          [x] Hamstrings                                [x]      Upper Quads soft tissue mob. [x]  Leg pull Left                                []       Bilateral Leg pull                           [x]  Soft tissue mobility                                                                                                [x]  Transverse Planes          Other exercises  Other exercises 1: HEP Posterior pelvic tilts. Other exercises 2: HEP sidelying sciatica stretch, leg off bed. Other exercises 3: Sidelying MFR treatment. Other: Infrared heat treatment 7J/35 seconds per site to Left piriformis area and SI. Positioning  Bed Postion Comment: Patient is in much less pain in sidelying on her Right side. Adaptations: Patient instructed to keep a pillow between her knees for neutral positioning. Assessment  Patient tolerated treatment well today. Patient reported a decrease in the pain level at the end of the treatment session. Reviewed the sidelying exercise and patient has been arriving using a cane and not furniture walking. Performance deficits / Impairments: Decreased functional mobility ; Decreased ADL status; Decreased ROM; Decreased strength;Decreased balance;Decreased high-level IADLs  REQUIRES OT FOLLOW UP: Yes  Activity Tolerance  Activity Tolerance: Patient limited by pain  Post Treatment Pain Screening  Pain at present: 6  Scale Used: Numeric Score  Intervention List: Pt educated regarding timing of pain meds  Comments / Details: Patient took pain meds prior to treatment today. Plan Continue with current POC. G-Code 10th visits     Goals  Long term goals 1-5 addressed. Time Frame for Long term goals : 2X per week, 5 weeks or 10 visits. Long term goal 1: Patient will no longer substitute hands to surfaces for mobility and balance. Patient will not furniture walk.   Long term goal 2: Patient will stand for 10 minute intervals prior to resting using good postural awareness and good

## 2017-11-10 ENCOUNTER — HOSPITAL ENCOUNTER (OUTPATIENT)
Dept: OCCUPATIONAL THERAPY | Age: 77
Setting detail: THERAPIES SERIES
Discharge: HOME | End: 2017-11-10
Payer: MEDICARE

## 2017-11-10 PROCEDURE — 97530 THERAPEUTIC ACTIVITIES: CPT

## 2017-11-10 PROCEDURE — 97140 MANUAL THERAPY 1/> REGIONS: CPT

## 2017-11-10 ASSESSMENT — PAIN DESCRIPTION - ORIENTATION: ORIENTATION: LEFT

## 2017-11-10 ASSESSMENT — PAIN DESCRIPTION - PAIN TYPE: TYPE: CHRONIC PAIN

## 2017-11-10 ASSESSMENT — PAIN SCALES - GENERAL: PAINLEVEL_OUTOF10: 7

## 2017-11-10 ASSESSMENT — PAIN DESCRIPTION - LOCATION: LOCATION: KNEE;LEG

## 2017-11-10 NOTE — PROGRESS NOTES
Occupational Therapy  Daily Treatment Note  Date: 11/10/2017  Patient Name: Bethany Coronel  :  1940  MRN: [id number]       Subjective Treatment started on time. Additional Pertinent Hx: Eval 10/24/17 PATRICE Escalante  Referring Practitioner: Dr. Krystina Armstrong  Diagnosis: L knee and thigh pain, lumbar DDD, and associated pain. Total # of Visits Approved: 8  Canceled Appointment: 1  Progress Note Counter: 5    Patient Currently in Pain: Yes  Pain Assessment: 0-10  Pain Level: 7  Pain Type: Chronic pain  Pain Location: Knee;Leg  Pain Orientation: Left      Treatment Activities:  Provided infrared heat treatment followed by direct treatment of MFR techniques. Treatment focus on pain reduction and increased mobility.                                     MFR Techniques:            Hand/Wrist Area:                                []       Forearm Flexors               []        Forearm Extensors                                []       Biceps                               []        Soft Tissue Mobility                                [x]       Arm Pulls                           []       Transverse Plane Releases      Lumbar/Pelvic/Sacral Area:                                []      Lumbo/sacral decompression        []  Pelvic floor transv.plane                                []      Psoas release                                []  Scar releases                                [x]      Lateral Obliques   Left                          []   Anterior pelvic tilts                                 [x]      Posterior Pelvic Tilts                      []  Lumbosacral junction                                 [x]      Dural tube release                                 Decompression                                 [x]      Piriformis Release  Left                       []   Soft tissue mobility    Lower Extremities:                                [x]      Suprapatellar & Quadriceps          []  Hamstrings [x]      Upper Quads soft tissue mob. [x]  Leg pulls                                []       Bilateral Leg pull                           []  Soft tissue mobility                                                                                                [x]  Transverse Planes         Other exercises  Other exercises 1: HEP Posterior pelvic tilts. Other exercises 2: HEP sidelying sciatica stretch, leg off bed. Other exercises 3: Sidelying MFR treatment. Other: Infrared heat treatment 6J/30 seconds per site to Left piriformis area. Positioning  Bed Postion Comment: Patient is in much less pain in sidelying on her Right side. Adaptations: Patient instructed to keep a pillow between her knees for neutral positioning. Assessment Patient tolerated treatment well until the last few minutes and patient was asked to try to lay in supine again. Patient increased with supine position. Arm pulls provided due to numbness in B hands fingertips. Issued a brush and instructed the patient to brush her fingertips/palms to increase sensory and circulation. Patient does not report a decrease in pain at the end of the treatment session. .  Performance deficits / Impairments: Decreased functional mobility ; Decreased ADL status; Decreased ROM; Decreased strength;Decreased balance;Decreased high-level IADLs  REQUIRES OT FOLLOW UP: Yes  Activity Tolerance  Activity Tolerance: Patient limited by pain  Post Treatment Pain Screening  Pain at present: 7  Scale Used: Numeric Score  Intervention List: Pt educated regarding timing of pain meds  Comments / Details: Patient took pain meds prior to treatment today. Plan Continue with current POC. G-Code 10th visits. Goals  Long term goals 1-5 addressed. Time Frame for Long term goals : 2X per week, 5 weeks or 10 visits. Long term goal 1: Patient will no longer substitute hands to surfaces for mobility and balance.  Patient will not furniture walk. Long term goal 2: Patient will stand for 10 minute intervals prior to resting using good postural awareness and good body mechanics for personal and household tasks. Long term goal 3: Reduce/eliminate functional leg length discrepency by reducing moderate to severe fascial restrictions of L LE. Long term goal 4: Patient will recruit neutral pelvis to support upright standing following reduction of severe fascial restrictions. Long term goal 5: Patient will follow established HEP once established.     Therapy Time   Individual Concurrent Group Co-treatment   Time In  10:00 am         Time Out  11:15 am         Minutes  75                 ROMARIO Cisneros/L  Electronically signed by ARLIN Cisneros on 11/10/17 at 11:28 AM

## 2017-11-15 ENCOUNTER — HOSPITAL ENCOUNTER (OUTPATIENT)
Dept: OCCUPATIONAL THERAPY | Age: 77
Setting detail: THERAPIES SERIES
Discharge: HOME | End: 2017-11-15
Payer: MEDICARE

## 2017-11-15 PROCEDURE — 97140 MANUAL THERAPY 1/> REGIONS: CPT

## 2017-11-15 ASSESSMENT — PAIN DESCRIPTION - LOCATION: LOCATION: KNEE;LEG

## 2017-11-15 ASSESSMENT — PAIN SCALES - GENERAL: PAINLEVEL_OUTOF10: 7

## 2017-11-15 ASSESSMENT — PAIN DESCRIPTION - PAIN TYPE: TYPE: CHRONIC PAIN

## 2017-11-15 ASSESSMENT — PAIN DESCRIPTION - ORIENTATION: ORIENTATION: LEFT

## 2017-11-15 NOTE — PROGRESS NOTES
Occupational Therapy  Daily Treatment Note  Date: 11/15/2017  Patient Name: Es Webber  :  1940  MRN: [id number]       Subjective Patient reports she fell on her Right hip going up the stairs from the garage yesterday. Patient arrived a few minutes late for treatment. General  Additional Pertinent Hx: Eval 10/24/17 PATRICE Brito  Referring Practitioner: Dr. Char Wood  Diagnosis: L knee and thigh pain, lumbar DDD, and associated pain. OT Visit Information  Total # of Visits Approved: 10  Canceled Appointment: 1  Progress Note Counter: 6  Pain Assessment  Patient Currently in Pain: Yes  Pain Assessment: 0-10  Pain Level: 7  Pain Type: Chronic pain  Pain Location: Knee;Leg  Pain Orientation: Left  Vital Signs  Patient Currently in Pain: Yes   Treatment Activities:  Focus of treatment to reduce pain and fascial restrictions and increase mobility and ease with ADL's. Provided direct treatment of MFR techniques as follows after infrared heat treatment. MFR Techniques:      Lumbar/Pelvic/Sacral Area:                                [x]      Lumbo/sacral decompression        []  Pelvic floor transv.plane                                []      Psoas release                                []  Scar releases                                [x]      Lateral Obliques  B                           []   Anterior pelvic tilts                                 [x]      Posterior Pelvic Tilts                      []  Lumbosacral junction                                 [x]      Dural tube release                                 Decompression                                 [x]      Piriformis Release B                       [x]   Soft tissue mobility    Lower Extremities:                                []      Suprapatellar & Quadriceps          []  Hamstrings                                [x]      Upper Quads soft tissue mob.        [x]  Leg pulls B                                []       Bilateral Leg pull                           []  Soft tissue mobility                                                                                                [x]  Transverse Planes                Other: Infrared heat treatment 6J/30 seconds per site to Left piriformis area. Positioning  Bed Postion Comment: Patient is in much less pain in sidelying on her Right side. Adaptations: Patient instructed to keep a pillow between her knees for neutral positioning. Assessment Patient tolerated treatment well. Patient requires mod A to perform bed mobility on the mat to enter or exit or change positions. Patient stated she will get the results of her MRI of her hip tomorrow. Performance deficits / Impairments: Decreased functional mobility ; Decreased ADL status; Decreased ROM; Decreased strength;Decreased balance;Decreased high-level IADLs          Pain: 6/10 at the end of the treatment. Plan Continue with the current POC. G-Code 10th visits. Goals  Long term goals 1-4 addressed. Time Frame for Long term goals : 2X per week, 5 weeks or 10 visits. Long term goal 1: Patient will no longer substitute hands to surfaces for mobility and balance. Patient will not furniture walk. Long term goal 2: Patient will stand for 10 minute intervals prior to resting using good postural awareness and good body mechanics for personal and household tasks. Long term goal 3: Reduce/eliminate functional leg length discrepency by reducing moderate to severe fascial restrictions of L LE. Long term goal 4: Patient will recruit neutral pelvis to support upright standing following reduction of severe fascial restrictions. Long term goal 5: Patient will follow established HEP once established.     Therapy Time   Individual Concurrent Group Co-treatment   Time In  2:04 pm         Time Out  3:00 pm         Minutes  Broderick Rueda, DOSS/L  Electronically signed by ARLIN Collins on 11/15/17 at 3:13 PM

## 2017-11-17 ENCOUNTER — HOSPITAL ENCOUNTER (OUTPATIENT)
Dept: OCCUPATIONAL THERAPY | Age: 77
Setting detail: THERAPIES SERIES
Discharge: HOME | End: 2017-11-17
Payer: MEDICARE

## 2017-11-17 PROCEDURE — G8990 OTHER PT/OT CURRENT STATUS: HCPCS

## 2017-11-17 PROCEDURE — G8991 OTHER PT/OT GOAL STATUS: HCPCS

## 2017-11-17 PROCEDURE — 97535 SELF CARE MNGMENT TRAINING: CPT

## 2017-11-17 PROCEDURE — G8992 OTHER PT/OT  D/C STATUS: HCPCS

## 2017-11-17 ASSESSMENT — PAIN DESCRIPTION - LOCATION: LOCATION: KNEE;LEG

## 2017-11-17 ASSESSMENT — PAIN DESCRIPTION - ORIENTATION: ORIENTATION: LEFT

## 2017-11-17 ASSESSMENT — PAIN DESCRIPTION - PAIN TYPE: TYPE: CHRONIC PAIN

## 2017-11-17 ASSESSMENT — PAIN SCALES - GENERAL: PAINLEVEL_OUTOF10: 7

## 2017-11-17 NOTE — PROGRESS NOTES
Occupational Therapy  DISCHARGE NOTE  Date: 2017  Patient Name: Maxi Ruano  :  1940  MRN: [id number]       Subjective Patient arrived with paperwork on the results of her hip x-ray and MRI. Additional Pertinent Hx: Eval 10/24/17 PATRICE Magallon  Referring Practitioner: Dr. Lizy Han  Diagnosis: L knee and thigh pain, lumbar DDD, and associated pain. Total # of Visits Approved: 8  Canceled Appointment: 1  Progress Note Counter: 7    Patient Currently in Pain: Yes  Pain Assessment: 0-10  Pain Level: 7  Pain Type: Chronic pain  Pain Location: Knee;Leg  Pain Orientation: Left    Treatment Activities:  Discussed the findings with the lead therapist NARCISA Corado and we decided the patient will no longer benefit from [facility] treatment based on the MRI and x-ray. This session will be utilized to educate the patient on back and hip precautions and care for ADL's  Patient was in agreement. Provided pictures of a hip kit, and gave a demonstration to don socks and pants and doff same. Demonstrated how a leg  would benefit her at this time until the surgery takes place as the patient has max difficulty lifting her left leg in and out of the car and the tub. Provided pictures and recommendation on a tub bench and demonstrated the proper procedure for safety. Also provided information on a swivel seat for use in a car or on a kitchen chair. Recommended patient purchase new slip on gym shoes with permanent elastic shoe laces to avoid bending to don/doff shoes. Provided written and verbal education on body mechanics for her back with ADLs. Patient is unable to exit the mat or bed with a log roll at this time and has tried numerous times at each treatment but does not possess the strength or mobility to complete same. Patient was receptive to all information. Patient was already familiar with a sock aid, but not the reacher,leg , tub bench, or shoes with permanent elastic shoe laces.  All

## 2017-11-24 ENCOUNTER — HOSPITAL ENCOUNTER (OUTPATIENT)
Dept: OCCUPATIONAL THERAPY | Age: 77
Setting detail: THERAPIES SERIES
Discharge: HOME | End: 2017-11-24
Payer: MEDICARE

## 2017-11-27 ENCOUNTER — APPOINTMENT (OUTPATIENT)
Dept: OCCUPATIONAL THERAPY | Age: 77
End: 2017-11-27
Payer: MEDICARE

## 2017-11-30 ENCOUNTER — APPOINTMENT (OUTPATIENT)
Dept: OCCUPATIONAL THERAPY | Age: 77
End: 2017-11-30
Payer: MEDICARE

## 2017-12-18 ENCOUNTER — OFFICE VISIT (OUTPATIENT)
Dept: FAMILY MEDICINE CLINIC | Age: 77
End: 2017-12-18

## 2017-12-18 VITALS
RESPIRATION RATE: 16 BRPM | OXYGEN SATURATION: 93 % | BODY MASS INDEX: 35.81 KG/M2 | TEMPERATURE: 97.3 F | HEART RATE: 104 BPM | DIASTOLIC BLOOD PRESSURE: 80 MMHG | WEIGHT: 194.6 LBS | HEIGHT: 62 IN | SYSTOLIC BLOOD PRESSURE: 142 MMHG

## 2017-12-18 DIAGNOSIS — Z85.3 HISTORY OF BREAST CANCER IN FEMALE: Chronic | ICD-10-CM

## 2017-12-18 DIAGNOSIS — E78.5 HYPERLIPIDEMIA, UNSPECIFIED HYPERLIPIDEMIA TYPE: Chronic | ICD-10-CM

## 2017-12-18 DIAGNOSIS — I10 ESSENTIAL HYPERTENSION: Chronic | ICD-10-CM

## 2017-12-18 DIAGNOSIS — Z01.818 PRE-OPERATIVE CLEARANCE: ICD-10-CM

## 2017-12-18 DIAGNOSIS — E03.9 HYPOTHYROIDISM, UNSPECIFIED TYPE: Chronic | ICD-10-CM

## 2017-12-18 DIAGNOSIS — Z23 NEED FOR VACCINATION: ICD-10-CM

## 2017-12-18 DIAGNOSIS — M15.9 GENERALIZED OSTEOARTHRITIS: Primary | Chronic | ICD-10-CM

## 2017-12-18 PROCEDURE — 90662 IIV NO PRSV INCREASED AG IM: CPT | Performed by: FAMILY MEDICINE

## 2017-12-18 PROCEDURE — G0008 ADMIN INFLUENZA VIRUS VAC: HCPCS | Performed by: FAMILY MEDICINE

## 2017-12-18 PROCEDURE — 99214 OFFICE O/P EST MOD 30 MIN: CPT | Performed by: FAMILY MEDICINE

## 2017-12-18 RX ORDER — METOPROLOL SUCCINATE 25 MG/1
25 TABLET, EXTENDED RELEASE ORAL DAILY
Qty: 30 TABLET | Refills: 5 | Status: SHIPPED | OUTPATIENT
Start: 2017-12-18 | End: 2018-06-23 | Stop reason: SDUPTHER

## 2017-12-18 ASSESSMENT — ENCOUNTER SYMPTOMS
SHORTNESS OF BREATH: 0
NAUSEA: 0
SORE THROAT: 0
BLOOD IN STOOL: 0
SINUS PRESSURE: 0
COUGH: 0
VOMITING: 0
ABDOMINAL PAIN: 0
DIARRHEA: 0
CHEST TIGHTNESS: 0
SINUS PAIN: 0
WHEEZING: 0
CONSTIPATION: 0
ANAL BLEEDING: 0

## 2017-12-18 NOTE — PROGRESS NOTES
Vaccine Information Sheet, \"Influenza - Inactivated\"  given to Severiano Corona, or parent/legal guardian of  Severiano Corona and verbalized understanding. Patient responses:    Have you ever had a reaction to a flu vaccine? No  Are you able to eat eggs without adverse effects? Yes  Do you have any current illness? No  Have you ever had Guillian Aurora Syndrome? No    Flu vaccine given per order. Please see immunization tab.

## 2017-12-26 ENCOUNTER — NURSE ONLY (OUTPATIENT)
Dept: FAMILY MEDICINE CLINIC | Age: 77
End: 2017-12-26

## 2017-12-26 ENCOUNTER — TELEPHONE (OUTPATIENT)
Dept: FAMILY MEDICINE CLINIC | Age: 77
End: 2017-12-26

## 2017-12-26 VITALS — SYSTOLIC BLOOD PRESSURE: 140 MMHG | DIASTOLIC BLOOD PRESSURE: 70 MMHG

## 2017-12-26 DIAGNOSIS — Z01.30 BLOOD PRESSURE CHECK: Primary | ICD-10-CM

## 2017-12-27 NOTE — TELEPHONE ENCOUNTER
Left message on machine for patient to return call during normal business hours of 8:30 AM and 5 PM.

## 2017-12-28 ENCOUNTER — TELEPHONE (OUTPATIENT)
Dept: FAMILY MEDICINE CLINIC | Age: 77
End: 2017-12-28

## 2017-12-28 DIAGNOSIS — N39.0 URINARY TRACT INFECTION WITH HEMATURIA, SITE UNSPECIFIED: Primary | ICD-10-CM

## 2017-12-28 DIAGNOSIS — R31.9 URINARY TRACT INFECTION WITH HEMATURIA, SITE UNSPECIFIED: Primary | ICD-10-CM

## 2017-12-28 RX ORDER — CIPROFLOXACIN 250 MG/1
250 TABLET, FILM COATED ORAL 2 TIMES DAILY
Qty: 6 TABLET | Refills: 0 | Status: SHIPPED | OUTPATIENT
Start: 2017-12-28 | End: 2017-12-31

## 2017-12-28 NOTE — TELEPHONE ENCOUNTER
Please call patient to inform her that recent urine testing done by preadmission testing for her upcoming surgery is consistent with a urinary tract infection. Please call lab and have urine sent for formal culture. I have sent 3 day course of cipro to treat UTI, patient should  medication and take full course. Once this is treated she is cleared for surgery, I will send paperwork to ortho office.

## 2018-01-02 ENCOUNTER — TELEPHONE (OUTPATIENT)
Dept: SURGERY | Age: 78
End: 2018-01-02

## 2018-01-02 NOTE — TELEPHONE ENCOUNTER
----- Message from Renetta Castellanos, [address] [place] sent at 12/29/2017  2:33 PM EST -----  Contact: patient  Pt called stating she will not be able to do 6mo mammogram due to getting hip replacement. Will schedule when she is recovered.   If any further questions, pt can be reached at # [phone]

## 2018-01-12 ENCOUNTER — TELEPHONE (OUTPATIENT)
Dept: PHARMACY | Facility: CLINIC | Age: 78
End: 2018-01-12

## 2018-01-12 DIAGNOSIS — M16.12 UNILATERAL PRIMARY OSTEOARTHRITIS, LEFT HIP: Primary | ICD-10-CM

## 2018-01-12 PROCEDURE — 1111F DSCHRG MED/CURRENT MED MERGE: CPT | Performed by: PHARMACIST

## 2018-01-12 RX ORDER — RIVAROXABAN 10 MG/1
10 TABLET, FILM COATED ORAL DAILY
Refills: 0 | COMMUNITY
Start: 2018-01-05 | End: 2018-04-19 | Stop reason: ALTCHOICE

## 2018-01-12 NOTE — TELEPHONE ENCOUNTER
CLINICAL PHARMACY NOTE  Post-Discharge Transitions of Care (MIKAEL)    Non-face-to-face services provided:  Assessment and support for treatment adherence and medication management-med rec completed. Subjective/Objective:  Demian Wilkins is a 68 y.o. female. Patient was discharged, per report, from [facility] on 1/4/18 with a diagnosis of UNILATERAL PRIMARY OSTEOARTHRITIS, LEFT HIP. Patient outreach to review discharge medications and provide medication review and management. Spoke with patient. Allergies   Allergen Reactions    Fenofibrate Other (See Comments)     myalgias    Statins Other (See Comments)     myalgias    Pcn [Penicillins] Rash     Current Outpatient Prescriptions   Medication Sig Dispense Refill    diclofenac sodium 1 % GEL    *sts still \"on hold\"; hasn't restarted following procedure yet       Misc. Devices (ROLLATOR) MISC USE AS DIRECTED  0    metoprolol succinate (TOPROL XL) 25 MG extended release tablet    *sts NOT taking Take 1 tablet by mouth daily 30 tablet 5    oxyCODONE-acetaminophen (PERCOCET) 5-325 MG per tablet    *has rx for 1-2t q4h prn from hospital, so is currently using more frequently than current listing with pain relief take 1/2 tablet by mouth three times a day if needed for pain  0    triamterene-hydrochlorothiazide (DYAZIDE) 37.5-25 MG per capsule Take 1 capsule by mouth daily 90 capsule 3    levothyroxine (SYNTHROID) 50 MCG tablet Take 1 tablet by mouth Daily 90 tablet 3    docusate (COLACE, DULCOLAX) 100 MG CAPS    *sts sometimes increases to BID Take 100 mg by mouth daily 30 capsule 3    Multiple Vitamin (MULTI-VITAMIN PO) Take 1 tablet by mouth daily. Additional Medications:  Xarelto 10mg daily - sts has @16 left and took today    CrCl cannot be calculated (Patient's most recent lab result is older than the maximum 90 days allowed. ). Assessment/Plan:  - Medication reconciliation completed.  Number of medications reviewed: 8    - Pt is not taking medications as directed by discharging physician. Number of discrepancies: 2. Instructions per discharge list provided except per below documentation. Identified medication discrepancies/issues:   · Category 3 (2):  1. NOT taking metoprolol as per current medication list - [facility] provider \"told me to go back to the other one\" (meaning triamterene-HCTZ)  · Reviewed 12/26/17 telephone encounter with her \"patient needs to restart the triamterene-HCTZ. ..and simply ADD the metoprolol as well\"  · Advised her to start/restart the metoprolol  2. Taking Xarelto from hospital discharge    - [facility] active medication list updated:  · Medications Added (1):  Xarelto  · Medications Changed (1):  Oxycodone-APAP    - Identified Potential Medication Interactions: No clinically significant interactions identified via [employer] Interaction Analysis as category D or higher.    - Renal Dosing: No renal adjustments necessary.    - Follow up appointment date (7 days for more severe illness, 14 days for others):    · PT visiting today  · Reports appt with Dr Jessa Subramanian next Friday, then will make PCP appt    No further recommendations at this time; closing encounter without routing.    Thank you,  Junior Ly, PharmD, [address]  Direct: [phone]  Department, toll free: [phone], option 7     =======================================================   For Pharmacy Admin Tracking Only    TCM Call Made?: No  [facility] [place] [facility] Select Patient?: Yes  Total # of Interventions Recommended: 2 -   - Increased Dose #: 1  - Updated Order #: 1  Total # Interventions Accepted: 2  Intervention Severity:   - Level 1 Intervention Present?: No   - Level 2 #: 0   - Level 3 #: 2  Outreach Status: Review Complete  Care Coordinator Outreach to Patient?: No  Provider Contacted?: No  Time Spent (min): 20

## 2018-04-19 ENCOUNTER — OFFICE VISIT (OUTPATIENT)
Dept: FAMILY MEDICINE CLINIC | Age: 78
End: 2018-04-19
Payer: MEDICARE

## 2018-04-19 VITALS
WEIGHT: 199.38 LBS | RESPIRATION RATE: 12 BRPM | HEART RATE: 104 BPM | HEIGHT: 62 IN | BODY MASS INDEX: 36.69 KG/M2 | SYSTOLIC BLOOD PRESSURE: 138 MMHG | OXYGEN SATURATION: 97 % | DIASTOLIC BLOOD PRESSURE: 82 MMHG | TEMPERATURE: 98.4 F

## 2018-04-19 DIAGNOSIS — B37.2 CANDIDAL INTERTRIGO: ICD-10-CM

## 2018-04-19 DIAGNOSIS — B37.2 SKIN YEAST INFECTION: Primary | ICD-10-CM

## 2018-04-19 PROCEDURE — 99213 OFFICE O/P EST LOW 20 MIN: CPT | Performed by: NURSE PRACTITIONER

## 2018-04-19 RX ORDER — TRAMADOL HYDROCHLORIDE 50 MG/1
TABLET ORAL
Refills: 0 | COMMUNITY
Start: 2018-04-11 | End: 2018-10-08 | Stop reason: ALTCHOICE

## 2018-04-19 RX ORDER — CYCLOBENZAPRINE HCL 10 MG
TABLET ORAL
Refills: 0 | COMMUNITY
Start: 2018-04-11 | End: 2019-07-12

## 2018-04-19 RX ORDER — MELOXICAM 15 MG/1
TABLET ORAL
Refills: 0 | COMMUNITY
Start: 2018-04-11 | End: 2018-11-12 | Stop reason: ALTCHOICE

## 2018-04-19 RX ORDER — CLOTRIMAZOLE AND BETAMETHASONE DIPROPIONATE 10; .5 MG/ML; MG/ML
LOTION TOPICAL
Qty: 1 BOTTLE | Refills: 1 | Status: SHIPPED | OUTPATIENT
Start: 2018-04-19 | End: 2018-10-08 | Stop reason: ALTCHOICE

## 2018-04-19 RX ORDER — NYSTATIN 100000 [USP'U]/G
POWDER TOPICAL
Qty: 1 BOTTLE | Refills: 1 | Status: SHIPPED | OUTPATIENT
Start: 2018-04-19 | End: 2018-10-08 | Stop reason: ALTCHOICE

## 2018-04-19 ASSESSMENT — ENCOUNTER SYMPTOMS
CONSTIPATION: 0
VOMITING: 0
RHINORRHEA: 0
ABDOMINAL PAIN: 0
COUGH: 0
DIARRHEA: 0
SHORTNESS OF BREATH: 0
SORE THROAT: 0
ABDOMINAL DISTENTION: 0
CHEST TIGHTNESS: 0
EYE PAIN: 0
NAIL CHANGES: 0
NAUSEA: 0

## 2018-04-23 ENCOUNTER — HOSPITAL ENCOUNTER (OUTPATIENT)
Dept: PREADMISSION TESTING | Age: 78
Discharge: HOME | End: 2018-04-27
Payer: MEDICARE

## 2018-04-23 VITALS
SYSTOLIC BLOOD PRESSURE: 157 MMHG | BODY MASS INDEX: 36.07 KG/M2 | TEMPERATURE: 97.7 F | DIASTOLIC BLOOD PRESSURE: 70 MMHG | RESPIRATION RATE: 16 BRPM | WEIGHT: 196 LBS | OXYGEN SATURATION: 95 % | HEIGHT: 62 IN | HEART RATE: 96 BPM

## 2018-04-23 DIAGNOSIS — G56.03 BILATERAL CARPAL TUNNEL SYNDROME: Chronic | ICD-10-CM

## 2018-04-23 LAB
ANION GAP SERPL CALCULATED.3IONS-SCNC: 15 MEQ/L (ref 7–13)
BUN BLDV-MCNC: 20 MG/DL (ref 8–23)
CALCIUM SERPL-MCNC: 9.4 MG/DL (ref 8.6–10.2)
CHLORIDE BLD-SCNC: 100 MEQ/L (ref 98–107)
CO2: 27 MEQ/L (ref 22–29)
CREAT SERPL-MCNC: 0.59 MG/DL (ref 0.5–0.9)
GFR AFRICAN AMERICAN: >60
GFR NON-AFRICAN AMERICAN: >60
GLUCOSE BLD-MCNC: 95 MG/DL (ref 74–109)
HCT VFR BLD CALC: 40.1 % (ref 37–47)
HEMOGLOBIN: 13.6 G/DL (ref 12–16)
MCH RBC QN AUTO: 28.2 PG (ref 27–31.3)
MCHC RBC AUTO-ENTMCNC: 33.8 % (ref 33–37)
MCV RBC AUTO: 83.5 FL (ref 82–100)
PDW BLD-RTO: 14 % (ref 11.5–14.5)
PLATELET # BLD: 301 K/UL (ref 130–400)
POTASSIUM SERPL-SCNC: 4 MEQ/L (ref 3.5–5.1)
RBC # BLD: 4.81 M/UL (ref 4.2–5.4)
SODIUM BLD-SCNC: 142 MEQ/L (ref 132–144)
WBC # BLD: 6.9 K/UL (ref 4.8–10.8)

## 2018-04-23 PROCEDURE — 80048 BASIC METABOLIC PNL TOTAL CA: CPT

## 2018-04-23 PROCEDURE — 85027 COMPLETE CBC AUTOMATED: CPT

## 2018-04-23 RX ORDER — LIDOCAINE HYDROCHLORIDE 10 MG/ML
1 INJECTION, SOLUTION EPIDURAL; INFILTRATION; INTRACAUDAL; PERINEURAL
Status: CANCELLED | OUTPATIENT
Start: 2018-04-23 | End: 2018-04-23

## 2018-04-23 RX ORDER — SODIUM CHLORIDE 0.9 % (FLUSH) 0.9 %
10 SYRINGE (ML) INJECTION PRN
Status: CANCELLED | OUTPATIENT
Start: 2018-04-23

## 2018-04-23 RX ORDER — SODIUM CHLORIDE, SODIUM LACTATE, POTASSIUM CHLORIDE, CALCIUM CHLORIDE 600; 310; 30; 20 MG/100ML; MG/100ML; MG/100ML; MG/100ML
INJECTION, SOLUTION INTRAVENOUS CONTINUOUS
Status: CANCELLED | OUTPATIENT
Start: 2018-04-23

## 2018-04-23 RX ORDER — SODIUM CHLORIDE 0.9 % (FLUSH) 0.9 %
10 SYRINGE (ML) INJECTION EVERY 12 HOURS SCHEDULED
Status: CANCELLED | OUTPATIENT
Start: 2018-04-23

## 2018-04-23 ASSESSMENT — ENCOUNTER SYMPTOMS
EYES NEGATIVE: 1
WHEEZING: 0
DOUBLE VISION: 0
NAUSEA: 0
EYE PAIN: 0
SORE THROAT: 0
HEARTBURN: 1
SHORTNESS OF BREATH: 0
DIARRHEA: 0
COUGH: 0
BACK PAIN: 0
BLURRED VISION: 0
CONSTIPATION: 0

## 2018-04-27 ENCOUNTER — TELEPHONE (OUTPATIENT)
Dept: FAMILY MEDICINE CLINIC | Age: 78
End: 2018-04-27

## 2018-05-03 ENCOUNTER — PREP FOR PROCEDURE (OUTPATIENT)
Dept: ORTHOPEDIC SURGERY | Age: 78
End: 2018-05-03

## 2018-05-03 ENCOUNTER — HOSPITAL ENCOUNTER (OUTPATIENT)
Age: 78
Setting detail: OUTPATIENT SURGERY
Discharge: HOME | End: 2018-05-03
Attending: ORTHOPAEDIC SURGERY | Admitting: ORTHOPAEDIC SURGERY
Payer: MEDICARE

## 2018-05-03 ENCOUNTER — ANESTHESIA (OUTPATIENT)
Dept: OPERATING ROOM | Age: 78
End: 2018-05-03
Payer: MEDICARE

## 2018-05-03 ENCOUNTER — ANESTHESIA EVENT (OUTPATIENT)
Dept: OPERATING ROOM | Age: 78
End: 2018-05-03
Payer: MEDICARE

## 2018-05-03 VITALS
SYSTOLIC BLOOD PRESSURE: 155 MMHG | DIASTOLIC BLOOD PRESSURE: 67 MMHG | BODY MASS INDEX: 36.07 KG/M2 | HEART RATE: 89 BPM | TEMPERATURE: 97.5 F | HEIGHT: 62 IN | OXYGEN SATURATION: 95 % | WEIGHT: 196 LBS | RESPIRATION RATE: 16 BRPM

## 2018-05-03 VITALS
SYSTOLIC BLOOD PRESSURE: 126 MMHG | RESPIRATION RATE: 11 BRPM | OXYGEN SATURATION: 99 % | TEMPERATURE: 96.8 F | DIASTOLIC BLOOD PRESSURE: 58 MMHG

## 2018-05-03 PROCEDURE — 6360000002 HC RX W HCPCS: Performed by: ORTHOPAEDIC SURGERY

## 2018-05-03 PROCEDURE — 2500000003 HC RX 250 WO HCPCS: Performed by: NURSE ANESTHETIST, CERTIFIED REGISTERED

## 2018-05-03 PROCEDURE — 3600000003 HC SURGERY LEVEL 3 BASE: Performed by: ORTHOPAEDIC SURGERY

## 2018-05-03 PROCEDURE — 7100000010 HC PHASE II RECOVERY - FIRST 15 MIN: Performed by: ORTHOPAEDIC SURGERY

## 2018-05-03 PROCEDURE — 6360000002 HC RX W HCPCS: Performed by: NURSE ANESTHETIST, CERTIFIED REGISTERED

## 2018-05-03 PROCEDURE — 7100000001 HC PACU RECOVERY - ADDTL 15 MIN: Performed by: ORTHOPAEDIC SURGERY

## 2018-05-03 PROCEDURE — 7100000000 HC PACU RECOVERY - FIRST 15 MIN: Performed by: ORTHOPAEDIC SURGERY

## 2018-05-03 PROCEDURE — 2580000003 HC RX 258: Performed by: NURSE PRACTITIONER

## 2018-05-03 PROCEDURE — 7100000011 HC PHASE II RECOVERY - ADDTL 15 MIN: Performed by: ORTHOPAEDIC SURGERY

## 2018-05-03 PROCEDURE — 2500000003 HC RX 250 WO HCPCS: Performed by: ORTHOPAEDIC SURGERY

## 2018-05-03 PROCEDURE — 6370000000 HC RX 637 (ALT 250 FOR IP)

## 2018-05-03 PROCEDURE — 3700000001 HC ADD 15 MINUTES (ANESTHESIA): Performed by: ORTHOPAEDIC SURGERY

## 2018-05-03 PROCEDURE — 3700000000 HC ANESTHESIA ATTENDED CARE: Performed by: ORTHOPAEDIC SURGERY

## 2018-05-03 PROCEDURE — 2580000003 HC RX 258: Performed by: ORTHOPAEDIC SURGERY

## 2018-05-03 PROCEDURE — 3600000013 HC SURGERY LEVEL 3 ADDTL 15MIN: Performed by: ORTHOPAEDIC SURGERY

## 2018-05-03 RX ORDER — SODIUM CHLORIDE 0.9 % (FLUSH) 0.9 %
10 SYRINGE (ML) INJECTION PRN
Status: DISCONTINUED | OUTPATIENT
Start: 2018-05-03 | End: 2018-05-03 | Stop reason: HOSPADM

## 2018-05-03 RX ORDER — LIDOCAINE HYDROCHLORIDE 10 MG/ML
1 INJECTION, SOLUTION EPIDURAL; INFILTRATION; INTRACAUDAL; PERINEURAL
Status: DISCONTINUED | OUTPATIENT
Start: 2018-05-03 | End: 2018-05-03 | Stop reason: HOSPADM

## 2018-05-03 RX ORDER — MORPHINE SULFATE 2 MG/ML
4 INJECTION, SOLUTION INTRAMUSCULAR; INTRAVENOUS
Status: CANCELLED | OUTPATIENT
Start: 2018-05-03

## 2018-05-03 RX ORDER — METOCLOPRAMIDE HYDROCHLORIDE 5 MG/ML
10 INJECTION INTRAMUSCULAR; INTRAVENOUS
Status: DISCONTINUED | OUTPATIENT
Start: 2018-05-03 | End: 2018-05-03 | Stop reason: HOSPADM

## 2018-05-03 RX ORDER — FENTANYL CITRATE 50 UG/ML
50 INJECTION, SOLUTION INTRAMUSCULAR; INTRAVENOUS EVERY 10 MIN PRN
Status: DISCONTINUED | OUTPATIENT
Start: 2018-05-03 | End: 2018-05-03 | Stop reason: HOSPADM

## 2018-05-03 RX ORDER — OXYCODONE HYDROCHLORIDE AND ACETAMINOPHEN 5; 325 MG/1; MG/1
2 TABLET ORAL EVERY 4 HOURS PRN
Status: CANCELLED | OUTPATIENT
Start: 2018-05-03

## 2018-05-03 RX ORDER — BUPIVACAINE HYDROCHLORIDE 5 MG/ML
INJECTION, SOLUTION EPIDURAL; INTRACAUDAL PRN
Status: DISCONTINUED | OUTPATIENT
Start: 2018-05-03 | End: 2018-05-03 | Stop reason: HOSPADM

## 2018-05-03 RX ORDER — OXYCODONE HYDROCHLORIDE AND ACETAMINOPHEN 5; 325 MG/1; MG/1
1 TABLET ORAL EVERY 4 HOURS PRN
Status: DISCONTINUED | OUTPATIENT
Start: 2018-05-03 | End: 2018-05-03 | Stop reason: HOSPADM

## 2018-05-03 RX ORDER — MORPHINE SULFATE 4 MG/ML
4 INJECTION, SOLUTION INTRAMUSCULAR; INTRAVENOUS
Status: DISCONTINUED | OUTPATIENT
Start: 2018-05-03 | End: 2018-05-03 | Stop reason: HOSPADM

## 2018-05-03 RX ORDER — SODIUM CHLORIDE 0.9 % (FLUSH) 0.9 %
10 SYRINGE (ML) INJECTION EVERY 12 HOURS SCHEDULED
Status: DISCONTINUED | OUTPATIENT
Start: 2018-05-03 | End: 2018-05-03 | Stop reason: HOSPADM

## 2018-05-03 RX ORDER — PROPOFOL 10 MG/ML
INJECTION, EMULSION INTRAVENOUS PRN
Status: DISCONTINUED | OUTPATIENT
Start: 2018-05-03 | End: 2018-05-03 | Stop reason: SDUPTHER

## 2018-05-03 RX ORDER — ONDANSETRON 2 MG/ML
4 INJECTION INTRAMUSCULAR; INTRAVENOUS EVERY 6 HOURS PRN
Status: CANCELLED | OUTPATIENT
Start: 2018-05-03

## 2018-05-03 RX ORDER — OXYCODONE HYDROCHLORIDE AND ACETAMINOPHEN 5; 325 MG/1; MG/1
2 TABLET ORAL EVERY 4 HOURS PRN
Status: DISCONTINUED | OUTPATIENT
Start: 2018-05-03 | End: 2018-05-03 | Stop reason: HOSPADM

## 2018-05-03 RX ORDER — SODIUM CHLORIDE, SODIUM LACTATE, POTASSIUM CHLORIDE, CALCIUM CHLORIDE 600; 310; 30; 20 MG/100ML; MG/100ML; MG/100ML; MG/100ML
INJECTION, SOLUTION INTRAVENOUS CONTINUOUS
Status: DISCONTINUED | OUTPATIENT
Start: 2018-05-03 | End: 2018-05-03 | Stop reason: HOSPADM

## 2018-05-03 RX ORDER — ACETAMINOPHEN 325 MG/1
650 TABLET ORAL EVERY 4 HOURS PRN
Status: CANCELLED | OUTPATIENT
Start: 2018-05-03

## 2018-05-03 RX ORDER — ONDANSETRON 2 MG/ML
INJECTION INTRAMUSCULAR; INTRAVENOUS PRN
Status: DISCONTINUED | OUTPATIENT
Start: 2018-05-03 | End: 2018-05-03 | Stop reason: SDUPTHER

## 2018-05-03 RX ORDER — GINSENG 100 MG
CAPSULE ORAL PRN
Status: DISCONTINUED | OUTPATIENT
Start: 2018-05-03 | End: 2018-05-03 | Stop reason: HOSPADM

## 2018-05-03 RX ORDER — OXYCODONE HYDROCHLORIDE AND ACETAMINOPHEN 5; 325 MG/1; MG/1
1 TABLET ORAL EVERY 4 HOURS PRN
Status: CANCELLED | OUTPATIENT
Start: 2018-05-03

## 2018-05-03 RX ORDER — PROPOFOL 10 MG/ML
INJECTION, EMULSION INTRAVENOUS CONTINUOUS PRN
Status: DISCONTINUED | OUTPATIENT
Start: 2018-05-03 | End: 2018-05-03 | Stop reason: SDUPTHER

## 2018-05-03 RX ORDER — DIPHENHYDRAMINE HYDROCHLORIDE 50 MG/ML
12.5 INJECTION INTRAMUSCULAR; INTRAVENOUS
Status: DISCONTINUED | OUTPATIENT
Start: 2018-05-03 | End: 2018-05-03 | Stop reason: HOSPADM

## 2018-05-03 RX ORDER — ONDANSETRON 2 MG/ML
4 INJECTION INTRAMUSCULAR; INTRAVENOUS EVERY 6 HOURS PRN
Status: DISCONTINUED | OUTPATIENT
Start: 2018-05-03 | End: 2018-05-03 | Stop reason: HOSPADM

## 2018-05-03 RX ORDER — HYDROCODONE BITARTRATE AND ACETAMINOPHEN 5; 325 MG/1; MG/1
1 TABLET ORAL PRN
Status: DISCONTINUED | OUTPATIENT
Start: 2018-05-03 | End: 2018-05-03 | Stop reason: HOSPADM

## 2018-05-03 RX ORDER — LIDOCAINE HYDROCHLORIDE 5 MG/ML
INJECTION, SOLUTION INFILTRATION; INTRAVENOUS PRN
Status: DISCONTINUED | OUTPATIENT
Start: 2018-05-03 | End: 2018-05-03 | Stop reason: SDUPTHER

## 2018-05-03 RX ORDER — ONDANSETRON 2 MG/ML
4 INJECTION INTRAMUSCULAR; INTRAVENOUS
Status: DISCONTINUED | OUTPATIENT
Start: 2018-05-03 | End: 2018-05-03 | Stop reason: HOSPADM

## 2018-05-03 RX ORDER — SODIUM CHLORIDE 9 MG/ML
50 INJECTION, SOLUTION INTRAVENOUS CONTINUOUS
Status: ACTIVE | OUTPATIENT
Start: 2018-05-03 | End: 2018-05-03

## 2018-05-03 RX ORDER — MORPHINE SULFATE 2 MG/ML
2 INJECTION, SOLUTION INTRAMUSCULAR; INTRAVENOUS
Status: CANCELLED | OUTPATIENT
Start: 2018-05-03

## 2018-05-03 RX ORDER — SODIUM CHLORIDE 0.9 % (FLUSH) 0.9 %
10 SYRINGE (ML) INJECTION EVERY 12 HOURS SCHEDULED
Status: CANCELLED | OUTPATIENT
Start: 2018-05-03

## 2018-05-03 RX ORDER — SODIUM CHLORIDE 0.9 % (FLUSH) 0.9 %
10 SYRINGE (ML) INJECTION PRN
Status: CANCELLED | OUTPATIENT
Start: 2018-05-03

## 2018-05-03 RX ORDER — DEXAMETHASONE SODIUM PHOSPHATE 10 MG/ML
INJECTION INTRAMUSCULAR; INTRAVENOUS PRN
Status: DISCONTINUED | OUTPATIENT
Start: 2018-05-03 | End: 2018-05-03 | Stop reason: SDUPTHER

## 2018-05-03 RX ORDER — SODIUM CHLORIDE 9 MG/ML
50 INJECTION, SOLUTION INTRAVENOUS CONTINUOUS
Status: CANCELLED | OUTPATIENT
Start: 2018-05-03 | End: 2018-05-03

## 2018-05-03 RX ORDER — ACETAMINOPHEN 325 MG/1
650 TABLET ORAL EVERY 4 HOURS PRN
Status: DISCONTINUED | OUTPATIENT
Start: 2018-05-03 | End: 2018-05-03 | Stop reason: HOSPADM

## 2018-05-03 RX ORDER — FENTANYL CITRATE 50 UG/ML
INJECTION, SOLUTION INTRAMUSCULAR; INTRAVENOUS PRN
Status: DISCONTINUED | OUTPATIENT
Start: 2018-05-03 | End: 2018-05-03 | Stop reason: SDUPTHER

## 2018-05-03 RX ORDER — MAGNESIUM HYDROXIDE 1200 MG/15ML
LIQUID ORAL CONTINUOUS PRN
Status: DISCONTINUED | OUTPATIENT
Start: 2018-05-03 | End: 2018-05-03 | Stop reason: HOSPADM

## 2018-05-03 RX ORDER — HYDROCODONE BITARTRATE AND ACETAMINOPHEN 5; 325 MG/1; MG/1
2 TABLET ORAL PRN
Status: DISCONTINUED | OUTPATIENT
Start: 2018-05-03 | End: 2018-05-03 | Stop reason: HOSPADM

## 2018-05-03 RX ORDER — MEPERIDINE HYDROCHLORIDE 25 MG/ML
12.5 INJECTION INTRAMUSCULAR; INTRAVENOUS; SUBCUTANEOUS EVERY 5 MIN PRN
Status: DISCONTINUED | OUTPATIENT
Start: 2018-05-03 | End: 2018-05-03 | Stop reason: HOSPADM

## 2018-05-03 RX ORDER — MORPHINE SULFATE 2 MG/ML
2 INJECTION, SOLUTION INTRAMUSCULAR; INTRAVENOUS
Status: DISCONTINUED | OUTPATIENT
Start: 2018-05-03 | End: 2018-05-03 | Stop reason: HOSPADM

## 2018-05-03 RX ADMIN — VANCOMYCIN HYDROCHLORIDE 1 G: 1 INJECTION, POWDER, LYOPHILIZED, FOR SOLUTION INTRAVENOUS at 07:21

## 2018-05-03 RX ADMIN — PROPOFOL 75 MCG/KG/MIN: 10 INJECTION, EMULSION INTRAVENOUS at 07:28

## 2018-05-03 RX ADMIN — PROPOFOL 30 MG: 10 INJECTION, EMULSION INTRAVENOUS at 07:40

## 2018-05-03 RX ADMIN — ONDANSETRON 4 MG: 2 INJECTION INTRAMUSCULAR; INTRAVENOUS at 07:36

## 2018-05-03 RX ADMIN — PROPOFOL 40 MG: 10 INJECTION, EMULSION INTRAVENOUS at 07:28

## 2018-05-03 RX ADMIN — DEXAMETHASONE SODIUM PHOSPHATE 10 MG: 10 INJECTION INTRAMUSCULAR; INTRAVENOUS at 07:36

## 2018-05-03 RX ADMIN — PROPOFOL 30 MG: 10 INJECTION, EMULSION INTRAVENOUS at 07:36

## 2018-05-03 RX ADMIN — FENTANYL CITRATE 25 MCG: 50 INJECTION, SOLUTION INTRAMUSCULAR; INTRAVENOUS at 07:34

## 2018-05-03 RX ADMIN — PROPOFOL 30 MG: 10 INJECTION, EMULSION INTRAVENOUS at 07:43

## 2018-05-03 RX ADMIN — SODIUM CHLORIDE, POTASSIUM CHLORIDE, SODIUM LACTATE AND CALCIUM CHLORIDE: 600; 310; 30; 20 INJECTION, SOLUTION INTRAVENOUS at 07:04

## 2018-05-03 RX ADMIN — LIDOCAINE HYDROCHLORIDE 30 ML: 5 INJECTION, SOLUTION INFILTRATION at 07:31

## 2018-05-03 ASSESSMENT — PULMONARY FUNCTION TESTS
PIF_VALUE: 1
PIF_VALUE: 1
PIF_VALUE: 0
PIF_VALUE: 1
PIF_VALUE: 0
PIF_VALUE: 1
PIF_VALUE: 0
PIF_VALUE: 1
PIF_VALUE: 0
PIF_VALUE: 1

## 2018-05-03 ASSESSMENT — PAIN SCALES - GENERAL: PAINLEVEL_OUTOF10: 0

## 2018-05-03 ASSESSMENT — PAIN - FUNCTIONAL ASSESSMENT: PAIN_FUNCTIONAL_ASSESSMENT: 0-10

## 2018-05-03 ASSESSMENT — PAIN DESCRIPTION - ORIENTATION: ORIENTATION: LEFT

## 2018-05-17 ENCOUNTER — HOSPITAL ENCOUNTER (OUTPATIENT)
Age: 78
Setting detail: SPECIMEN
Discharge: HOME | End: 2018-05-17
Payer: MEDICARE

## 2018-05-17 ENCOUNTER — ANESTHESIA (OUTPATIENT)
Dept: OPERATING ROOM | Age: 78
End: 2018-05-17
Payer: MEDICARE

## 2018-05-17 ENCOUNTER — HOSPITAL ENCOUNTER (OUTPATIENT)
Age: 78
Setting detail: OUTPATIENT SURGERY
Discharge: HOME | End: 2018-05-17
Attending: ORTHOPAEDIC SURGERY | Admitting: ORTHOPAEDIC SURGERY
Payer: MEDICARE

## 2018-05-17 ENCOUNTER — OFFICE VISIT (OUTPATIENT)
Dept: FAMILY MEDICINE CLINIC | Age: 78
End: 2018-05-17
Payer: MEDICARE

## 2018-05-17 ENCOUNTER — ANESTHESIA EVENT (OUTPATIENT)
Dept: OPERATING ROOM | Age: 78
End: 2018-05-17
Payer: MEDICARE

## 2018-05-17 VITALS
HEIGHT: 62 IN | OXYGEN SATURATION: 94 % | BODY MASS INDEX: 36.07 KG/M2 | TEMPERATURE: 97.8 F | SYSTOLIC BLOOD PRESSURE: 132 MMHG | RESPIRATION RATE: 12 BRPM | HEART RATE: 104 BPM | DIASTOLIC BLOOD PRESSURE: 70 MMHG | WEIGHT: 196 LBS

## 2018-05-17 VITALS
BODY MASS INDEX: 36.07 KG/M2 | HEART RATE: 79 BPM | SYSTOLIC BLOOD PRESSURE: 180 MMHG | HEIGHT: 62 IN | DIASTOLIC BLOOD PRESSURE: 92 MMHG | OXYGEN SATURATION: 98 % | WEIGHT: 196 LBS | RESPIRATION RATE: 16 BRPM | TEMPERATURE: 97.7 F

## 2018-05-17 VITALS — OXYGEN SATURATION: 100 % | SYSTOLIC BLOOD PRESSURE: 183 MMHG | DIASTOLIC BLOOD PRESSURE: 85 MMHG

## 2018-05-17 DIAGNOSIS — N30.00 ACUTE CYSTITIS WITHOUT HEMATURIA: Primary | ICD-10-CM

## 2018-05-17 DIAGNOSIS — R30.0 DYSURIA: ICD-10-CM

## 2018-05-17 LAB
BILIRUBIN, POC: ABNORMAL
BLOOD URINE, POC: ABNORMAL
CLARITY, POC: ABNORMAL
COLOR, POC: YELLOW
GLUCOSE URINE, POC: ABNORMAL
KETONES, POC: ABNORMAL
LEUKOCYTE EST, POC: ABNORMAL
NITRITE, POC: ABNORMAL
PH, POC: 6
PROTEIN, POC: ABNORMAL
SPECIFIC GRAVITY, POC: 1.02
UROBILINOGEN, POC: 3.5

## 2018-05-17 PROCEDURE — 3700000000 HC ANESTHESIA ATTENDED CARE: Performed by: ORTHOPAEDIC SURGERY

## 2018-05-17 PROCEDURE — 2500000003 HC RX 250 WO HCPCS: Performed by: ORTHOPAEDIC SURGERY

## 2018-05-17 PROCEDURE — 2500000003 HC RX 250 WO HCPCS: Performed by: NURSE ANESTHETIST, CERTIFIED REGISTERED

## 2018-05-17 PROCEDURE — 3700000001 HC ADD 15 MINUTES (ANESTHESIA): Performed by: ORTHOPAEDIC SURGERY

## 2018-05-17 PROCEDURE — 2580000003 HC RX 258: Performed by: NURSE PRACTITIONER

## 2018-05-17 PROCEDURE — 3600000013 HC SURGERY LEVEL 3 ADDTL 15MIN: Performed by: ORTHOPAEDIC SURGERY

## 2018-05-17 PROCEDURE — 6360000002 HC RX W HCPCS: Performed by: ORTHOPAEDIC SURGERY

## 2018-05-17 PROCEDURE — 87086 URINE CULTURE/COLONY COUNT: CPT

## 2018-05-17 PROCEDURE — 81003 URINALYSIS AUTO W/O SCOPE: CPT | Performed by: NURSE PRACTITIONER

## 2018-05-17 PROCEDURE — 3600000003 HC SURGERY LEVEL 3 BASE: Performed by: ORTHOPAEDIC SURGERY

## 2018-05-17 PROCEDURE — 7100000011 HC PHASE II RECOVERY - ADDTL 15 MIN: Performed by: ORTHOPAEDIC SURGERY

## 2018-05-17 PROCEDURE — 81001 URINALYSIS AUTO W/SCOPE: CPT

## 2018-05-17 PROCEDURE — 99213 OFFICE O/P EST LOW 20 MIN: CPT | Performed by: NURSE PRACTITIONER

## 2018-05-17 PROCEDURE — 6360000002 HC RX W HCPCS: Performed by: NURSE ANESTHETIST, CERTIFIED REGISTERED

## 2018-05-17 PROCEDURE — 2580000003 HC RX 258: Performed by: ORTHOPAEDIC SURGERY

## 2018-05-17 PROCEDURE — 7100000010 HC PHASE II RECOVERY - FIRST 15 MIN: Performed by: ORTHOPAEDIC SURGERY

## 2018-05-17 PROCEDURE — 6370000000 HC RX 637 (ALT 250 FOR IP)

## 2018-05-17 RX ORDER — ACETAMINOPHEN 325 MG/1
650 TABLET ORAL EVERY 4 HOURS PRN
Status: CANCELLED | OUTPATIENT
Start: 2018-05-17

## 2018-05-17 RX ORDER — DIPHENHYDRAMINE HYDROCHLORIDE 50 MG/ML
12.5 INJECTION INTRAMUSCULAR; INTRAVENOUS
Status: DISCONTINUED | OUTPATIENT
Start: 2018-05-17 | End: 2018-05-17 | Stop reason: HOSPADM

## 2018-05-17 RX ORDER — MORPHINE SULFATE 2 MG/ML
2 INJECTION, SOLUTION INTRAMUSCULAR; INTRAVENOUS
Status: CANCELLED | OUTPATIENT
Start: 2018-05-17

## 2018-05-17 RX ORDER — ONDANSETRON 2 MG/ML
4 INJECTION INTRAMUSCULAR; INTRAVENOUS EVERY 6 HOURS PRN
Status: CANCELLED | OUTPATIENT
Start: 2018-05-17

## 2018-05-17 RX ORDER — PHENAZOPYRIDINE HYDROCHLORIDE 100 MG/1
100 TABLET, FILM COATED ORAL 3 TIMES DAILY PRN
Qty: 15 TABLET | Refills: 0 | Status: SHIPPED | OUTPATIENT
Start: 2018-05-17 | End: 2018-05-22

## 2018-05-17 RX ORDER — MEPERIDINE HYDROCHLORIDE 25 MG/ML
12.5 INJECTION INTRAMUSCULAR; INTRAVENOUS; SUBCUTANEOUS EVERY 5 MIN PRN
Status: DISCONTINUED | OUTPATIENT
Start: 2018-05-17 | End: 2018-05-17 | Stop reason: HOSPADM

## 2018-05-17 RX ORDER — SODIUM CHLORIDE 0.9 % (FLUSH) 0.9 %
10 SYRINGE (ML) INJECTION EVERY 12 HOURS SCHEDULED
Status: CANCELLED | OUTPATIENT
Start: 2018-05-17

## 2018-05-17 RX ORDER — SODIUM CHLORIDE 0.9 % (FLUSH) 0.9 %
10 SYRINGE (ML) INJECTION PRN
Status: DISCONTINUED | OUTPATIENT
Start: 2018-05-17 | End: 2018-05-17 | Stop reason: HOSPADM

## 2018-05-17 RX ORDER — GINSENG 100 MG
CAPSULE ORAL PRN
Status: DISCONTINUED | OUTPATIENT
Start: 2018-05-17 | End: 2018-05-17 | Stop reason: HOSPADM

## 2018-05-17 RX ORDER — OXYCODONE HYDROCHLORIDE AND ACETAMINOPHEN 5; 325 MG/1; MG/1
1 TABLET ORAL EVERY 4 HOURS PRN
Status: CANCELLED | OUTPATIENT
Start: 2018-05-17

## 2018-05-17 RX ORDER — SODIUM CHLORIDE, SODIUM LACTATE, POTASSIUM CHLORIDE, CALCIUM CHLORIDE 600; 310; 30; 20 MG/100ML; MG/100ML; MG/100ML; MG/100ML
INJECTION, SOLUTION INTRAVENOUS CONTINUOUS
Status: DISCONTINUED | OUTPATIENT
Start: 2018-05-17 | End: 2018-05-17 | Stop reason: HOSPADM

## 2018-05-17 RX ORDER — OXYCODONE HYDROCHLORIDE AND ACETAMINOPHEN 5; 325 MG/1; MG/1
2 TABLET ORAL EVERY 4 HOURS PRN
Status: CANCELLED | OUTPATIENT
Start: 2018-05-17

## 2018-05-17 RX ORDER — METOCLOPRAMIDE HYDROCHLORIDE 5 MG/ML
10 INJECTION INTRAMUSCULAR; INTRAVENOUS
Status: DISCONTINUED | OUTPATIENT
Start: 2018-05-17 | End: 2018-05-17 | Stop reason: HOSPADM

## 2018-05-17 RX ORDER — PROPOFOL 10 MG/ML
INJECTION, EMULSION INTRAVENOUS CONTINUOUS PRN
Status: DISCONTINUED | OUTPATIENT
Start: 2018-05-17 | End: 2018-05-17 | Stop reason: SDUPTHER

## 2018-05-17 RX ORDER — SODIUM CHLORIDE 0.9 % (FLUSH) 0.9 %
10 SYRINGE (ML) INJECTION EVERY 12 HOURS SCHEDULED
Status: DISCONTINUED | OUTPATIENT
Start: 2018-05-17 | End: 2018-05-17 | Stop reason: HOSPADM

## 2018-05-17 RX ORDER — HYDROCODONE BITARTRATE AND ACETAMINOPHEN 5; 325 MG/1; MG/1
2 TABLET ORAL PRN
Status: DISCONTINUED | OUTPATIENT
Start: 2018-05-17 | End: 2018-05-17 | Stop reason: HOSPADM

## 2018-05-17 RX ORDER — ONDANSETRON 2 MG/ML
4 INJECTION INTRAMUSCULAR; INTRAVENOUS
Status: DISCONTINUED | OUTPATIENT
Start: 2018-05-17 | End: 2018-05-17 | Stop reason: HOSPADM

## 2018-05-17 RX ORDER — SODIUM CHLORIDE 9 MG/ML
50 INJECTION, SOLUTION INTRAVENOUS CONTINUOUS
Status: CANCELLED | OUTPATIENT
Start: 2018-05-17 | End: 2018-05-17

## 2018-05-17 RX ORDER — HYDROCODONE BITARTRATE AND ACETAMINOPHEN 5; 325 MG/1; MG/1
1 TABLET ORAL PRN
Status: DISCONTINUED | OUTPATIENT
Start: 2018-05-17 | End: 2018-05-17 | Stop reason: HOSPADM

## 2018-05-17 RX ORDER — FENTANYL CITRATE 50 UG/ML
50 INJECTION, SOLUTION INTRAMUSCULAR; INTRAVENOUS EVERY 10 MIN PRN
Status: DISCONTINUED | OUTPATIENT
Start: 2018-05-17 | End: 2018-05-17 | Stop reason: HOSPADM

## 2018-05-17 RX ORDER — NITROFURANTOIN 25; 75 MG/1; MG/1
100 CAPSULE ORAL 2 TIMES DAILY
Qty: 10 CAPSULE | Refills: 0 | Status: SHIPPED | OUTPATIENT
Start: 2018-05-17 | End: 2018-05-22

## 2018-05-17 RX ORDER — MAGNESIUM HYDROXIDE 1200 MG/15ML
LIQUID ORAL CONTINUOUS PRN
Status: DISCONTINUED | OUTPATIENT
Start: 2018-05-17 | End: 2018-05-17 | Stop reason: HOSPADM

## 2018-05-17 RX ORDER — MIDAZOLAM HYDROCHLORIDE 1 MG/ML
INJECTION INTRAMUSCULAR; INTRAVENOUS PRN
Status: DISCONTINUED | OUTPATIENT
Start: 2018-05-17 | End: 2018-05-17 | Stop reason: SDUPTHER

## 2018-05-17 RX ORDER — MORPHINE SULFATE 4 MG/ML
4 INJECTION, SOLUTION INTRAMUSCULAR; INTRAVENOUS
Status: CANCELLED | OUTPATIENT
Start: 2018-05-17

## 2018-05-17 RX ORDER — LIDOCAINE HYDROCHLORIDE 10 MG/ML
1 INJECTION, SOLUTION EPIDURAL; INFILTRATION; INTRACAUDAL; PERINEURAL
Status: DISCONTINUED | OUTPATIENT
Start: 2018-05-17 | End: 2018-05-17 | Stop reason: HOSPADM

## 2018-05-17 RX ORDER — BUPIVACAINE HYDROCHLORIDE 5 MG/ML
INJECTION, SOLUTION EPIDURAL; INTRACAUDAL PRN
Status: DISCONTINUED | OUTPATIENT
Start: 2018-05-17 | End: 2018-05-17 | Stop reason: HOSPADM

## 2018-05-17 RX ORDER — SODIUM CHLORIDE 0.9 % (FLUSH) 0.9 %
10 SYRINGE (ML) INJECTION PRN
Status: CANCELLED | OUTPATIENT
Start: 2018-05-17

## 2018-05-17 RX ORDER — OXYCODONE AND ACETAMINOPHEN 10; 325 MG/1; MG/1
1 TABLET ORAL EVERY 4 HOURS PRN
COMMUNITY
End: 2018-10-08 | Stop reason: ALTCHOICE

## 2018-05-17 RX ORDER — LIDOCAINE HYDROCHLORIDE 10 MG/ML
INJECTION, SOLUTION EPIDURAL; INFILTRATION; INTRACAUDAL; PERINEURAL PRN
Status: DISCONTINUED | OUTPATIENT
Start: 2018-05-17 | End: 2018-05-17 | Stop reason: SDUPTHER

## 2018-05-17 RX ADMIN — MIDAZOLAM HYDROCHLORIDE 2 MG: 1 INJECTION, SOLUTION INTRAMUSCULAR; INTRAVENOUS at 12:41

## 2018-05-17 RX ADMIN — SODIUM CHLORIDE, POTASSIUM CHLORIDE, SODIUM LACTATE AND CALCIUM CHLORIDE: 600; 310; 30; 20 INJECTION, SOLUTION INTRAVENOUS at 11:25

## 2018-05-17 RX ADMIN — VANCOMYCIN HYDROCHLORIDE 1000 MG: 1 INJECTION, POWDER, LYOPHILIZED, FOR SOLUTION INTRAVENOUS at 12:39

## 2018-05-17 RX ADMIN — LIDOCAINE HYDROCHLORIDE 50 MG: 10 INJECTION, SOLUTION EPIDURAL; INFILTRATION; INTRACAUDAL; PERINEURAL at 12:41

## 2018-05-17 RX ADMIN — PROPOFOL 50 MCG/KG/MIN: 10 INJECTION, EMULSION INTRAVENOUS at 12:41

## 2018-05-17 ASSESSMENT — ENCOUNTER SYMPTOMS
TROUBLE SWALLOWING: 0
EYE ITCHING: 0
EYE PAIN: 0
SINUS PRESSURE: 0
ABDOMINAL PAIN: 0
COUGH: 0
EYE REDNESS: 0
DIARRHEA: 0
SHORTNESS OF BREATH: 0
EYE DISCHARGE: 0
RHINORRHEA: 0
NAUSEA: 0
VOMITING: 0

## 2018-05-17 ASSESSMENT — PAIN DESCRIPTION - LOCATION: LOCATION: HAND

## 2018-05-17 ASSESSMENT — PULMONARY FUNCTION TESTS
PIF_VALUE: 1
PIF_VALUE: 0

## 2018-05-17 ASSESSMENT — PAIN SCALES - GENERAL
PAINLEVEL_OUTOF10: 0

## 2018-05-17 ASSESSMENT — PAIN DESCRIPTION - PAIN TYPE: TYPE: SURGICAL PAIN

## 2018-05-17 ASSESSMENT — PAIN DESCRIPTION - ORIENTATION: ORIENTATION: RIGHT

## 2018-05-18 LAB
BACTERIA: ABNORMAL /HPF
BILIRUBIN URINE: NEGATIVE
BLOOD, URINE: ABNORMAL
CLARITY: ABNORMAL
COLOR: YELLOW
EPITHELIAL CELLS, UA: ABNORMAL /HPF
GLUCOSE URINE: NEGATIVE MG/DL
KETONES, URINE: NEGATIVE MG/DL
LEUKOCYTE ESTERASE, URINE: ABNORMAL
NITRITE, URINE: NEGATIVE
PH UA: 6 (ref 5–9)
PROTEIN UA: 30 MG/DL
RBC UA: ABNORMAL /HPF (ref 0–2)
SPECIFIC GRAVITY UA: 1.02 (ref 1–1.03)
UROBILINOGEN, URINE: 0.2 E.U./DL
WBC UA: >100 /HPF (ref 0–5)

## 2018-05-20 LAB — URINE CULTURE, ROUTINE: NORMAL

## 2018-06-22 DIAGNOSIS — E03.9 HYPOTHYROIDISM, UNSPECIFIED TYPE: Chronic | ICD-10-CM

## 2018-06-22 RX ORDER — LEVOTHYROXINE SODIUM 0.05 MG/1
50 TABLET ORAL DAILY
Qty: 30 TABLET | Refills: 0 | Status: SHIPPED | OUTPATIENT
Start: 2018-06-22 | End: 2018-08-01 | Stop reason: SDUPTHER

## 2018-07-02 RX ORDER — TRIAMTERENE AND HYDROCHLOROTHIAZIDE 37.5; 25 MG/1; MG/1
1 CAPSULE ORAL DAILY
Qty: 90 CAPSULE | Refills: 0 | Status: SHIPPED | OUTPATIENT
Start: 2018-07-02 | End: 2018-09-30 | Stop reason: SDUPTHER

## 2018-08-01 DIAGNOSIS — I10 ESSENTIAL HYPERTENSION: Chronic | ICD-10-CM

## 2018-08-01 DIAGNOSIS — E03.9 HYPOTHYROIDISM, UNSPECIFIED TYPE: Chronic | ICD-10-CM

## 2018-08-01 RX ORDER — METOPROLOL SUCCINATE 25 MG/1
TABLET, EXTENDED RELEASE ORAL
Qty: 30 TABLET | Refills: 0 | Status: SHIPPED | OUTPATIENT
Start: 2018-08-01 | End: 2018-08-31 | Stop reason: SDUPTHER

## 2018-08-01 RX ORDER — LEVOTHYROXINE SODIUM 0.05 MG/1
TABLET ORAL
Qty: 30 TABLET | Refills: 0 | Status: SHIPPED | OUTPATIENT
Start: 2018-08-01 | End: 2018-09-11 | Stop reason: SDUPTHER

## 2018-08-01 NOTE — TELEPHONE ENCOUNTER
Pharmacy requesting medication refill.  Please approve or deny this request.    Rx requested:  Requested Prescriptions     Pending Prescriptions Disp Refills    metoprolol succinate (TOPROL XL) 25 MG extended release tablet [Pharmacy Med Name: METOPROLOL SUCC ER 25 MG TAB] 30 tablet 0     Sig: take 1 tablet by mouth once daily    levothyroxine (SYNTHROID) 50 MCG tablet [Pharmacy Med Name: LEVOTHYROXINE 50 MCG TABLET] 30 tablet 0     Sig: take 1 tablet by mouth once daily       Last Office Visit:   12/18/17     Last Labs:  4/23/17     Next Visit Date:  Future Appointments  Date Time Provider Ariadna Bright   10/8/2018 10:00 AM Yissel Rizzo MD [facility]

## 2018-08-28 ENCOUNTER — HOSPITAL ENCOUNTER (OUTPATIENT)
Dept: WOMENS IMAGING | Age: 78
Discharge: HOME | End: 2018-08-30
Payer: MEDICARE

## 2018-08-28 DIAGNOSIS — Z12.39 BREAST CANCER SCREENING: ICD-10-CM

## 2018-08-28 PROCEDURE — 77067 SCR MAMMO BI INCL CAD: CPT

## 2018-08-31 DIAGNOSIS — I10 ESSENTIAL HYPERTENSION: Chronic | ICD-10-CM

## 2018-08-31 RX ORDER — METOPROLOL SUCCINATE 25 MG/1
TABLET, EXTENDED RELEASE ORAL
Qty: 30 TABLET | Refills: 5 | Status: SHIPPED | OUTPATIENT
Start: 2018-08-31 | End: 2019-02-07 | Stop reason: SDUPTHER

## 2018-09-11 DIAGNOSIS — E03.9 HYPOTHYROIDISM, UNSPECIFIED TYPE: Chronic | ICD-10-CM

## 2018-09-11 RX ORDER — LEVOTHYROXINE SODIUM 0.05 MG/1
TABLET ORAL
Qty: 30 TABLET | Refills: 0 | Status: SHIPPED | OUTPATIENT
Start: 2018-09-11 | End: 2018-10-10 | Stop reason: SDUPTHER

## 2018-10-02 LAB
ALBUMIN SERPL-MCNC: 4.1 G/DL
ALP BLD-CCNC: 84 U/L
ALT SERPL-CCNC: 16 U/L
ANION GAP SERPL CALCULATED.3IONS-SCNC: NORMAL MMOL/L
AST SERPL-CCNC: 17 U/L
BASOPHILS ABSOLUTE: 0.06 /ΜL
BASOPHILS RELATIVE PERCENT: 0.7 %
BILIRUB SERPL-MCNC: 0.5 MG/DL (ref 0.1–1.4)
BILIRUBIN, URINE: NEGATIVE
BLOOD, URINE: NEGATIVE
BUN BLDV-MCNC: NORMAL MG/DL
CALCIUM SERPL-MCNC: 10.2 MG/DL
CHLORIDE BLD-SCNC: 102 MMOL/L
CLARITY: CLEAR
CO2: NORMAL MMOL/L
COLOR: YELLOW
CREAT SERPL-MCNC: 0.79 MG/DL
EOSINOPHILS ABSOLUTE: 0.27 /ΜL
EOSINOPHILS RELATIVE PERCENT: 3.2 %
GFR CALCULATED: NORMAL
GLUCOSE BLD-MCNC: 98 MG/DL
GLUCOSE URINE: NEGATIVE
HCT VFR BLD CALC: 41.9 % (ref 36–46)
HEMOGLOBIN: 13.4 G/DL (ref 12–16)
INR BLD: 0.96
KETONES, URINE: NEGATIVE
LEUKOCYTE ESTERASE, URINE: POSITIVE
LYMPHOCYTES ABSOLUTE: 1.41 /ΜL
LYMPHOCYTES RELATIVE PERCENT: 16.5 %
MCH RBC QN AUTO: 28.8 PG
MCHC RBC AUTO-ENTMCNC: 32 G/DL
MCV RBC AUTO: 89.9 FL
MONOCYTES ABSOLUTE: 0.8 /ΜL
MONOCYTES RELATIVE PERCENT: 9.4 %
NEUTROPHILS ABSOLUTE: 5.98 /ΜL
NEUTROPHILS RELATIVE PERCENT: 70 %
NITRITE, URINE: NEGATIVE
PH UA: 5 (ref 4.5–8)
PLATELET # BLD: 315 K/ΜL
PMV BLD AUTO: 10.3 FL
POTASSIUM SERPL-SCNC: 3.9 MMOL/L
PROTEIN UA: NEGATIVE
PROTIME: 10.6 SECONDS
RBC # BLD: 4.66 10^6/ΜL
SODIUM BLD-SCNC: 142 MMOL/L
SPECIFIC GRAVITY, URINE: 1.02
TOTAL PROTEIN: 7.2
UROBILINOGEN, URINE: NORMAL
WBC # BLD: 8.5 10^3/ML

## 2018-10-08 ENCOUNTER — OFFICE VISIT (OUTPATIENT)
Dept: FAMILY MEDICINE CLINIC | Age: 78
End: 2018-10-08
Payer: MEDICARE

## 2018-10-08 ENCOUNTER — TELEPHONE (OUTPATIENT)
Dept: PEDIATRICS CLINIC | Age: 78
End: 2018-10-08

## 2018-10-08 VITALS
BODY MASS INDEX: 36.47 KG/M2 | RESPIRATION RATE: 20 BRPM | TEMPERATURE: 98.5 F | SYSTOLIC BLOOD PRESSURE: 130 MMHG | HEIGHT: 62 IN | OXYGEN SATURATION: 98 % | HEART RATE: 103 BPM | WEIGHT: 198.2 LBS | DIASTOLIC BLOOD PRESSURE: 82 MMHG

## 2018-10-08 DIAGNOSIS — E78.5 HYPERLIPIDEMIA, UNSPECIFIED HYPERLIPIDEMIA TYPE: Chronic | ICD-10-CM

## 2018-10-08 DIAGNOSIS — I10 ESSENTIAL HYPERTENSION: Chronic | ICD-10-CM

## 2018-10-08 DIAGNOSIS — M15.9 GENERALIZED OSTEOARTHRITIS: Chronic | ICD-10-CM

## 2018-10-08 DIAGNOSIS — M25.561 CHRONIC PAIN OF RIGHT KNEE: Primary | ICD-10-CM

## 2018-10-08 DIAGNOSIS — Z23 NEED FOR VACCINATION: ICD-10-CM

## 2018-10-08 DIAGNOSIS — G89.29 CHRONIC PAIN OF RIGHT KNEE: Primary | ICD-10-CM

## 2018-10-08 DIAGNOSIS — Z01.818 PRE-OPERATIVE CLEARANCE: ICD-10-CM

## 2018-10-08 DIAGNOSIS — Z85.3 HISTORY OF BREAST CANCER IN FEMALE: Chronic | ICD-10-CM

## 2018-10-08 DIAGNOSIS — E03.9 HYPOTHYROIDISM, UNSPECIFIED TYPE: Chronic | ICD-10-CM

## 2018-10-08 DIAGNOSIS — K21.9 GASTROESOPHAGEAL REFLUX DISEASE, ESOPHAGITIS PRESENCE NOT SPECIFIED: Chronic | ICD-10-CM

## 2018-10-08 PROCEDURE — 99214 OFFICE O/P EST MOD 30 MIN: CPT | Performed by: FAMILY MEDICINE

## 2018-10-08 PROCEDURE — 3288F FALL RISK ASSESSMENT DOCD: CPT | Performed by: FAMILY MEDICINE

## 2018-10-08 PROCEDURE — 90662 IIV NO PRSV INCREASED AG IM: CPT | Performed by: FAMILY MEDICINE

## 2018-10-08 PROCEDURE — G0008 ADMIN INFLUENZA VIRUS VAC: HCPCS | Performed by: FAMILY MEDICINE

## 2018-10-08 PROCEDURE — G8510 SCR DEP NEG, NO PLAN REQD: HCPCS | Performed by: FAMILY MEDICINE

## 2018-10-08 ASSESSMENT — ENCOUNTER SYMPTOMS
BLOOD IN STOOL: 0
SINUS PRESSURE: 0
VOMITING: 0
TROUBLE SWALLOWING: 0
DIARRHEA: 0
ABDOMINAL PAIN: 0
WHEEZING: 0
RHINORRHEA: 0
NAUSEA: 0
EYE PAIN: 0
SHORTNESS OF BREATH: 0
EYE DISCHARGE: 0
CONSTIPATION: 0
COUGH: 0
SORE THROAT: 0
PHOTOPHOBIA: 0
CHEST TIGHTNESS: 0
FACIAL SWELLING: 0

## 2018-10-08 ASSESSMENT — PATIENT HEALTH QUESTIONNAIRE - PHQ9
1. LITTLE INTEREST OR PLEASURE IN DOING THINGS: 0
2. FEELING DOWN, DEPRESSED OR HOPELESS: 0
SUM OF ALL RESPONSES TO PHQ QUESTIONS 1-9: 0
SUM OF ALL RESPONSES TO PHQ9 QUESTIONS 1 & 2: 0
SUM OF ALL RESPONSES TO PHQ QUESTIONS 1-9: 0

## 2018-10-08 NOTE — PROGRESS NOTES
Negative for abdominal pain, blood in stool, constipation, diarrhea, nausea and vomiting. Genitourinary: Negative for dysuria, flank pain, frequency, hematuria and urgency. Musculoskeletal: Negative for myalgias. Skin: Negative for rash and wound. Neurological: Negative for dizziness, syncope, weakness, numbness and headaches. Psychiatric/Behavioral: Negative for dysphoric mood. The patient is not nervous/anxious. Objective:     /82 (Site: Right Upper Arm, Position: Sitting, Cuff Size: Large Adult)   Pulse 103   Temp 98.5 °F (36.9 °C) (Tympanic)   Resp 20   Ht 5' 2\" (1.575 m)   Wt 198 lb 3.2 oz (89.9 kg)   LMP  (Approximate)   SpO2 98%   Breastfeeding? No   BMI 36.25 kg/m²     Physical Exam   Constitutional: She is oriented to person, place, and time. She appears well-developed and well-nourished. No distress. HENT:   Head: Normocephalic and atraumatic. Right Ear: Tympanic membrane, external ear and ear canal normal.   Left Ear: Tympanic membrane, external ear and ear canal normal.   Nose: Mucosal edema (turbinates swollen and pale) present. Right sinus exhibits no maxillary sinus tenderness and no frontal sinus tenderness. Left sinus exhibits no maxillary sinus tenderness and no frontal sinus tenderness. Mouth/Throat: Uvula is midline, oropharynx is clear and moist and mucous membranes are normal. No oropharyngeal exudate or posterior oropharyngeal erythema. Eyes: Pupils are equal, round, and reactive to light. Conjunctivae and EOM are normal. Right eye exhibits no discharge. Left eye exhibits no discharge. Neck: Neck supple. Carotid bruit is not present. No thyromegaly present. Cardiovascular: Normal rate, regular rhythm, normal heart sounds and intact distal pulses. No murmur heard. Pulmonary/Chest: Effort normal and breath sounds normal. No respiratory distress. She has no wheezes. She has no rales. Abdominal: Soft.  Bowel sounds are normal. She exhibits no Gastroesophageal reflux disease, esophagitis presence not specified  No reported gastrointestinal complaints or change in appetite. Continue current management    7. Hypothyroidism, unspecified type  Will follow labwork over time    - TSH without Reflex; Future    8. History of breast cancer in female  Patient has chronic follow-up in place with breast surgeon and is status post recent benign mammogram    9. Need for vaccination    - INFLUENZA, HIGH DOSE, 65 YRS +, IM, PF, PREFILL SYR, 0.5ML (FLUZONE HD)      Modified Medications    No medications on file       New Prescriptions    No medications on file       Medications Discontinued During This Encounter   Medication Reason    Misc. Devices (ROLLATOR) MISC Therapy completed    oxyCODONE-acetaminophen (PERCOCET)  MG per tablet Therapy completed    traMADol (ULTRAM) 50 MG tablet Therapy completed    nystatin (MYCOSTATIN) 816166 UNIT/GM powder Therapy completed    clotrimazole-betamethasone (LOTRISONE) 1-0.05 % lotion Therapy completed       Return for nurse visit BP check in 2 days, Annual Wellness Visit in 2 Months.     Tina Blakely MD

## 2018-10-10 ENCOUNTER — HOSPITAL ENCOUNTER (OUTPATIENT)
Dept: LAB | Age: 78
Discharge: HOME | End: 2018-10-10
Payer: MEDICARE

## 2018-10-10 ENCOUNTER — NURSE ONLY (OUTPATIENT)
Dept: FAMILY MEDICINE CLINIC | Age: 78
End: 2018-10-10

## 2018-10-10 ENCOUNTER — TELEPHONE (OUTPATIENT)
Dept: FAMILY MEDICINE CLINIC | Age: 78
End: 2018-10-10

## 2018-10-10 VITALS — SYSTOLIC BLOOD PRESSURE: 138 MMHG | DIASTOLIC BLOOD PRESSURE: 86 MMHG

## 2018-10-10 DIAGNOSIS — E03.9 HYPOTHYROIDISM, UNSPECIFIED TYPE: Chronic | ICD-10-CM

## 2018-10-10 DIAGNOSIS — I10 ESSENTIAL HYPERTENSION: Primary | ICD-10-CM

## 2018-10-10 DIAGNOSIS — E78.5 HYPERLIPIDEMIA, UNSPECIFIED HYPERLIPIDEMIA TYPE: Chronic | ICD-10-CM

## 2018-10-10 LAB
ALBUMIN SERPL-MCNC: 4.2 G/DL (ref 3.9–4.9)
ALP BLD-CCNC: 80 U/L (ref 40–130)
ALT SERPL-CCNC: 16 U/L (ref 0–33)
AST SERPL-CCNC: 19 U/L (ref 0–35)
BILIRUB SERPL-MCNC: 0.5 MG/DL (ref 0–1.2)
BILIRUBIN DIRECT: <0.2 MG/DL (ref 0–0.3)
BILIRUBIN, INDIRECT: NORMAL MG/DL (ref 0–0.6)
CHOLESTEROL, TOTAL: 224 MG/DL (ref 0–199)
HDLC SERPL-MCNC: 47 MG/DL (ref 40–59)
LDL CHOLESTEROL CALCULATED: 138 MG/DL (ref 0–129)
TOTAL PROTEIN: 7.4 G/DL (ref 6.4–8.1)
TRIGL SERPL-MCNC: 197 MG/DL (ref 0–200)
TSH SERPL DL<=0.05 MIU/L-ACNC: 2.23 UIU/ML (ref 0.27–4.2)

## 2018-10-10 PROCEDURE — 36415 COLL VENOUS BLD VENIPUNCTURE: CPT

## 2018-10-10 PROCEDURE — 84443 ASSAY THYROID STIM HORMONE: CPT

## 2018-10-10 PROCEDURE — 80061 LIPID PANEL: CPT

## 2018-10-10 PROCEDURE — 80076 HEPATIC FUNCTION PANEL: CPT

## 2018-10-10 RX ORDER — LEVOTHYROXINE SODIUM 0.05 MG/1
50 TABLET ORAL DAILY
Qty: 30 TABLET | Refills: 1 | Status: SHIPPED | OUTPATIENT
Start: 2018-10-10 | End: 2018-12-06 | Stop reason: SDUPTHER

## 2018-10-10 NOTE — TELEPHONE ENCOUNTER
Patient in the office today for a nurse BP check. Patient is currently taking metoprolol 25 MG qd and triamterene-HCTZ 37.5-25 MG qd. Patient states that she does not check her BP at home      BP today was 138/86.       BP Readings from Last 3 Encounters:   10/08/18 130/82   05/17/18 132/70   05/17/18 (!) 180/92         Please advise

## 2018-10-18 ENCOUNTER — TELEPHONE (OUTPATIENT)
Dept: FAMILY MEDICINE CLINIC | Age: 78
End: 2018-10-18

## 2018-10-18 NOTE — TELEPHONE ENCOUNTER
Dr. Jerod Cerda received labs that show preadmission testing at the top. Per Dr. Prakash Rivera Call patient and ask her if she has surgery coming up and if so she needs to set up surgical clearance appointment so he can clear her for the surgery.

## 2018-11-12 ENCOUNTER — TELEPHONE (OUTPATIENT)
Dept: PHARMACY | Facility: CLINIC | Age: 78
End: 2018-11-12

## 2018-11-12 DIAGNOSIS — Z79.899 ENCOUNTER FOR MEDICATION REVIEW: Primary | ICD-10-CM

## 2018-11-12 PROCEDURE — 1111F DSCHRG MED/CURRENT MED MERGE: CPT | Performed by: PHARMACIST

## 2018-11-12 RX ORDER — ACETAMINOPHEN 500 MG
500 TABLET ORAL EVERY 6 HOURS PRN
COMMUNITY

## 2018-11-12 RX ORDER — DOCUSATE SODIUM 100 MG/1
100 CAPSULE, LIQUID FILLED ORAL DAILY PRN
COMMUNITY
End: 2019-07-12

## 2018-11-12 RX ORDER — OXYCODONE HYDROCHLORIDE AND ACETAMINOPHEN 5; 325 MG/1; MG/1
1 TABLET ORAL EVERY 6 HOURS PRN
COMMUNITY
Start: 2018-11-09 | End: 2019-07-12 | Stop reason: ALTCHOICE

## 2018-12-03 ENCOUNTER — OFFICE VISIT (OUTPATIENT)
Dept: FAMILY MEDICINE CLINIC | Age: 78
End: 2018-12-03
Payer: MEDICARE

## 2018-12-03 VITALS
SYSTOLIC BLOOD PRESSURE: 138 MMHG | OXYGEN SATURATION: 99 % | DIASTOLIC BLOOD PRESSURE: 82 MMHG | WEIGHT: 192.8 LBS | BODY MASS INDEX: 35.48 KG/M2 | RESPIRATION RATE: 16 BRPM | HEIGHT: 62 IN | HEART RATE: 76 BPM | TEMPERATURE: 98 F

## 2018-12-03 DIAGNOSIS — J30.9 ALLERGIC RHINITIS, UNSPECIFIED SEASONALITY, UNSPECIFIED TRIGGER: ICD-10-CM

## 2018-12-03 DIAGNOSIS — Z00.00 ROUTINE GENERAL MEDICAL EXAMINATION AT A HEALTH CARE FACILITY: Primary | ICD-10-CM

## 2018-12-03 DIAGNOSIS — R05.9 COUGH: ICD-10-CM

## 2018-12-03 DIAGNOSIS — Z23 NEED FOR VACCINATION: ICD-10-CM

## 2018-12-03 PROCEDURE — G0438 PPPS, INITIAL VISIT: HCPCS | Performed by: FAMILY MEDICINE

## 2018-12-03 RX ORDER — BENZONATATE 100 MG/1
100 CAPSULE ORAL 3 TIMES DAILY PRN
Qty: 21 CAPSULE | Refills: 0 | Status: SHIPPED | OUTPATIENT
Start: 2018-12-03 | End: 2018-12-10

## 2018-12-03 RX ORDER — IPRATROPIUM BROMIDE 42 UG/1
2 SPRAY, METERED NASAL 3 TIMES DAILY
Qty: 1 BOTTLE | Refills: 0 | Status: SHIPPED | OUTPATIENT
Start: 2018-12-03 | End: 2019-07-12 | Stop reason: ALTCHOICE

## 2018-12-03 ASSESSMENT — ANXIETY QUESTIONNAIRES: GAD7 TOTAL SCORE: 0

## 2018-12-03 ASSESSMENT — PATIENT HEALTH QUESTIONNAIRE - PHQ9
SUM OF ALL RESPONSES TO PHQ QUESTIONS 1-9: 0
SUM OF ALL RESPONSES TO PHQ QUESTIONS 1-9: 0

## 2018-12-03 ASSESSMENT — LIFESTYLE VARIABLES: HOW OFTEN DO YOU HAVE A DRINK CONTAINING ALCOHOL: 0

## 2018-12-03 NOTE — PATIENT INSTRUCTIONS
example, this would mean 60 grams of fat or less per day. Saturated fat and trans fat in your diet raises your blood cholesterol the most, much more than dietary cholesterol does. For this reason, on a heart-healthy diet, less than 7% of your calories should come from saturated fat and ideally 0% from trans fat. On an 1800-calorie diet, this translates into less than 14 grams of saturated fat per day, leaving 46 grams of fat to come from mono- and polyunsaturated fats.    Food Choices on a Heart Healthy Diet   Food Category   Foods Recommended   Foods to Avoid   Grains   Breads and rolls without salted tops Most dry and cooked cereals Unsalted crackers and breadsticks Low-sodium or homemade breadcrumbs or stuffing All rice and pastas   Breads, rolls, and crackers with salted tops High-fat baked goods (eg, muffins, donuts, pastries) Quick breads, self-rising flour, and biscuit mixes Regular bread crumbs Instant hot cereals Commercially prepared rice, pasta, or stuffing mixes   Vegetables   Most fresh, frozen, and low-sodium canned vegetables Low-sodium and salt-free vegetable juices Canned vegetables if unsalted or rinsed   Regular canned vegetables and juices, including sauerkraut and pickled vegetables Frozen vegetables with sauces Commercially prepared potato and vegetable mixes   Fruits   Most fresh, frozen, and canned fruits All fruit juices   Fruits processed with salt or sodium   Milk   Nonfat or low-fat (1%) milk Nonfat or low-fat yogurt Cottage cheese, low-fat ricotta, cheeses labeled as low-fat and low-sodium   Whole milk Reduced-fat (2%) milk Malted and chocolate milk Full fat yogurt Most cheeses (unless low-fat and low salt) Buttermilk (no more than 1 cup per week)   Meats and Beans   Lean cuts of fresh or frozen beef, veal, lamb, or pork (look for the word loin) Fresh or frozen poultry without the skin Fresh or frozen fish and some shellfish Egg whites and egg substitutes (Limit whole eggs to three per and cholesterol amounts. For products low in sodium, look for sodium free, very low sodium, low sodium, no added salt, and unsalted   Skip the salt when cooking or at the table; if food needs more flavor, get creative and try out different herbs and spices. Garlic and onion also add substantial flavor to foods. Trim any visible fat off meat and poultry before cooking, and drain the fat off after moore. Use cooking methods that require little or no added fat, such as grilling, boiling, baking, poaching, broiling, roasting, steaming, stir-frying, and sauting. Avoid fast food and convenience food. They tend to be high in saturated and trans fat and have a lot of added salt. Talk to a registered dietitian for individualized diet advice. Last Reviewed: March 2011 Grace Aranda MS, MPH, RD   Updated: 3/29/2011   ·   Patient information: Weight loss treatments    INTRODUCTION -- Obesity is a major international problem, and Americans are among the heaviest people in the world. The percentage of obese people in the [place] has risen steadily. Many people find that although they initially lose weight by dieting, they quickly regain the weight after the diet ends. Because it so hard to keep weight off over time, it is important to have as much information and support as possible before starting a diet. You are most likely to be successful in losing weight and keeping it off when you believe that your body weight can be controlled. STARTING A WEIGHT LOSS PROGRAM -- Some people like to talk to their doctor or nurse to get help choosing the best plan, monitoring progress, and getting advice and support along the way. To know what treatment (or combination of treatments) will work best, determine your body mass index (BMI) and waist circumference (measurement). The BMI is calculated from your height and weight.   A person with a BMI between 25 and 29.9 is considered overweight   A person with a BMI of 30 or greater is considered to be obese  A waist circumference greater than 35 inches (88 cm) in women and 40 inches (102 cm) in men increases the risk of obesity-related complications, such as heart disease and diabetes. People who are obese and who have a larger waist size may need more aggressive weight loss treatment than others. Talk to your doctor or nurse for advice. Types of treatment -- Based on your measurements and your medical history, your doctor or nurse can determine what combination of weight loss treatments would work best for you. Treatments may include changes in lifestyle, exercise, dieting, and, in some cases, weight loss medicines or weight loss surgery. Weight loss surgery, also called bariatric surgery, is reserved for people with severe obesity who have not responded to other weight loss treatments. SETTING A WEIGHT LOSS GOAL -- It is important to set a realistic weight loss goal. Your first goal should be to avoid gaining more weight and staying at your current weight (or within 5 percent). Many people have a \"dream\" weight that is difficult or impossible to achieve. People at high risk of developing diabetes who are able to lose 5 percent of their body weight and maintain this weight will reduce their risk of developing diabetes by about 50 percent and reduce their blood pressure. This is a success. Losing more than 15 percent of your body weight and staying at this weight is an extremely good result, even if you never reach your \"dream\" or \"ideal\" weight. LIFESTYLE CHANGES -- Programs that help you to change your lifestyle are usually run by psychologists or other professionals. The goals of lifestyle changes are to help you change your eating habits, become more active, and be more aware of how much you eat and exercise, helping you to make healthier choices. This type of treatment can be broken down into three steps:   The triggers that make you want to eat   Eating   What happens The support person needs to understand your goals. Learn to be strong -- Learning to be strong when tempted by food is an important part of losing weight. As an example, you will need to learn how to say \"no\" and continue to say no when urged to eat at parties and social gatherings. Develop strategies for events before you go, such as eating before you go or taking low-calorie snacks and drinks with you. Develop a support system -- Having a support system is helpful when losing weight. This is why many [employer] groups are successful. Family support is also essential; if your family does not support your efforts to lose weight, this can slow your progress or even keep you from losing weight. Positive thinking -- People often have conversations with themselves in their head; these conversations can be positive or negative. If you eat a piece of cake that was not planned, you may respond by thinking, \"Oh, you stupid idiot, you've blown your diet! \" and as a result, you may eat more cake. A positive thought for the same event could be, \"Well, I ate cake when it was not on my plan. Now I should do something to get back on track. \" A positive approach is much more likely to be successful than a negative one. Reduce stress -- Although stress is a part of everyday life, it can trigger uncontrolled eating in some people. It is important to find a way to get through these difficult times without eating or by eating low-calorie food, like raw vegetables. It may be helpful to imagine a relaxing place that allows you to temporarily escape from stress. With deep breaths and closed eyes, you can imagine this relaxing place for a few minutes. Self-help programs -- Self-help programs like [place] Watchers®, Overeaters Anonymous®, and Take Off Mily (TOPS)© work for some people.  As with all weight loss programs, you are most likely to be successful with these plans if you make long-term changes in how you found in fruits, vegetables, and grains (including breads, rice, pasta, and cereal), alcoholic beverages, and in dairy products. Meat and fish do not contain carbohydrates. Side effects of very-low-carbohydrate diets can include constipation, headache, bad breath, muscle cramps, diarrhea, and weakness. Mediterranean diet -- The term \"Mediterranean diet\" refers to a way of eating that is common in olive-growing regions around the [facility]. Although there is some variation in Mediterranean diets, there are some similarities. Most Mediterranean diets include:  A high level of monounsaturated fats (from olive or canola oil, walnuts, pecans, almonds) and a low level of saturated fats (from butter)   A high amount of vegetables, fruits, legumes, and grains (7 to 10 servings of fruits and vegetables per day)   A moderate amount of milk and dairy products, mostly in the form of cheese. Use low-fat dairy products (skim milk, fat-free yogurt, low-fat cheese). A relatively low amount of red meat and meat products. Substitute fish or poultry for red meat. For those who drink alcohol, a modest amount (mainly as red wine) may help to protect against cardiovascular disease. A modest amount is up to one (4 ounce) glass per day for women and up to two glasses per day for men. Which diet is best? -- Studies have compared different diets, including:  Very-low-carbohydrate (Atkins)   Macronutrient balance controlling glycemic load (Zone®)   Reduced-calorie (Weight Watchers®)   Very-low-fat (Ornish)  No one diet is \"best\" for weight loss. Any diet will help you to lose weight if you stick with the diet. Therefore, it is important to choose a diet that includes foods you like. Fad diets -- Fad diets often promise quick weight loss (more than 1 to 2 pounds per week) and may claim that you do not need to exercise or give up favorite foods. Some fad diets cost a lot of money, because you have to pay for seminars or pills. heart disease, heart failure, uncontrolled hypertension, stroke, irregular heart rhythms, or peripheral vascular disease (poor circulation in the legs). Orlistat -- Orlistat (Xenical® 120 mg capsules) is a medicine that reduces the amount of fat your body absorbs from the foods you eat. A lower-dose version is now available without a prescription (Vega® 60 mg capsules) in many countries, including the [place]. The medicine is recommended three times per day, taken with a meal; you can skip a dose if you skip a meal or if the meal contains no fat. After one year of treatment with orlistat, the average weight loss is approximately 8 to 10 percent of initial body weight (4 percent more than in those who took a placebo). Cholesterol levels often improve, and blood pressure sometimes falls. In people with diabetes, orlistat may help control blood sugar levels. Side effects occur in 15 to 10 percent of people and may include stomach cramps, gas, diarrhea, leakage of stool, or oily stools. These problems are more likely when you take orlistat with a high-fat meal (if more than 30 percent of calories in the meal are from fat). Side effects usually improve as you learn to avoid high-fat foods. Severe liver injury has been reported rarely in patients taking orlistat, but it is not known if orlistat caused the liver problems. Diet supplements -- Diet supplements are widely used by people who are trying to lose weight, although the safety and efficacy of these supplements are often unproven. A few of the more common diet supplements are discussed below; none of these are recommended because they have not been studied carefully, and there is no proof they are safe or effective. Chitosan and wheat dextrin are ineffective for weight loss, and their use is not recommended. Ephedra, a compound related to ephedrine, is no longer available in the [place] due to safety concerns.  Many nonprescription diet pills plastic surgery (called \"body contouring\") to remove excess skin from the body, particularly in the abdominal area. Before you decide to have weight loss surgery, you must commit to staying healthy for life. This includes following up with your healthcare team, exercising most days of the week, and eating a sensible diet every day. It can be difficult to develop new eating and exercise habits after weight loss surgery, and you will have to work hard to stick to your goals. Recovering from surgery and losing weight can be stressful and emotional, and it is important to have the support of family and friends. Working with a , therapist, or support group can help you through the ups and downs. WHERE TO GET MORE INFORMATION -- Your healthcare provider is the best source of information for questions and concerns related to your medical problem. This article will be updated as needed every four months on our Web site ([email or web address])  ·     [address] [facility]       As we get older, changes in balance, gait, strength, vision, hearing, and cognition make even the most youthful senior more prone to accidents. Falls are one of the leading health risks for older people. This increased risk of falling is related to:   Aging process (eg, decreased muscle strength, slowed reflexes)   Higher incidence of chronic health problems (eg, arthritis, diabetes) that may limit mobility, agility or sensory awareness   Side effects of medicine (eg, dizziness, blurred vision)especially medicines like prescription pain medicines and drugs used to treat mental health conditions   Depending on the brittleness of your bones, the consequences of a fall can be serious and long lasting. Home Life   Research by the Association of Aging [facility]) shows that some home accidents among older adults can be prevented by making simple lifestyle changes and basic modifications and repairs to the home environment.  Here are some lifestyle changes that experts recommend:   Have your hearing and vision checked regularly. Be sure to wear prescription glasses that are right for you. Speak to your doctor or pharmacist about the possible side effects of your medicines. A number of medicines can cause dizziness. If you have problems with sleep, talk to your doctor. Limit your intake of alcohol. If necessary, use a cane or walker to help maintain your balance. Wear supportive, rubber-soled shoes, even at home. If you live in a region that gets wintry weather, you may want to put special cleats on your shoes to prevent you from slipping on the snow and ice. Exercise regularly to help maintain muscle tone, agility, and balance. Always hold the banister when going up or down stairs. Also, use  bars when getting in or out of the bath or shower, or using the toilet. To avoid dizziness, get up slowly from a lying down position. Sit up first, dangling your legs for a minute or two before rising to a standing position. Overall Home Safety Check   According to the Consumer Product Safety Commision's \"Older Consumer Home Safety Checklist,\" it is important to check for potential hazards in each room. And remember, proper lighting is an essential factor in home safety. If you cannot see clearly, you are more likely to fall. Important questions to ask yourself include:   Are lamp, electric, extension, and telephone cords placed out of the flow of traffic and maintained in good condition? Have frayed cords been replaced? Are all small rugs and runners slip resistant? If not, you can secure them to the floor with a special double-sided carpet tape. Are smoke detectors properly locatedone on every floor of your home and one outside of every sleeping area? Are they in good working order? Are batteries replaced at least once a year? Do you have a well-maintained carbon monoxide detector outside every sleeping are in your home? Does your furniture layout leave plenty of space to maneuver between and around chairs, tables, beds, and sofas? Are hallways, stairs and passages between rooms well lit? Can you reach a lamp without getting out of bed? Are floor surfaces well maintained? Shag rugs, high-pile carpeting, tile floors, and polished wood floors can be particularly slippery. Stairs should always have handrails and be carpeted or fitted with a non-skid tread. Is your telephone easily reachable. Is the cord safely tucked away? Room by Room   According to the Association of Aging, bathrooms and mayte are the two most potentially hazardous rooms in your home. In the Kitchen    Be sure your stove is in proper working order and always make sure burners and the oven are off before you go out or go to sleep. Keep pots on the back burners, turn handles away from the front of the stove, and keep stove clean and free of grease build-up. Kitchen ventilation systems and range exhausts should be working properly. Keep flammable objects such as towels and pot holders away from the cooking area except when in use. Make sure kitchen curtains are tied back. Move cords and appliances away from the sink and hot surfaces. If extension cords are needed, install wiring guides so they do not hang over the sink, range, or working areas. Look for coffee pots, kettles and toaster ovens with automatic shut-offs. Keep a mop handy in the kitchen so you can wipe up spills instantly. You should also have a small fire extinguisher. Arrange your kitchen with frequently used items on lower shelves to avoid the need to stand on a stepstool to reach them. Make sure countertops are well-lit to avoid injuries while cutting and preparing food. In the Bathroom    Use a non-slip mat or decals in the tub and shower, since wet, soapy tile or porcelain surfaces are extremely slippery.     Make sure bathroom rugs are non-skid or tape them

## 2018-12-03 NOTE — PROGRESS NOTES
Medicare Annual Wellness Visit  Name: Alyssia Heller Date: 12/3/2018   MRN: [id number] Sex: Female   Age: 66 y.o. Ethnicity: Non-/Non    : 1940 Race: Kenton Kovacs is here for Medicare AWV    Screenings for behavioral, psychosocial and functional/safety risks, and cognitive dysfunction are all negative except as indicated below. These results, as well as other patient data from the [address] form, are documented in Flowsheets linked to this Encounter. Allergies   Allergen Reactions    Fenofibrate Other (See Comments)     myalgias    Statins Other (See Comments)     myalgias    Pcn [Penicillins] Rash       Prior to Visit Medications    Medication Sig Taking? Authorizing Provider   zoster recombinant adjuvanted vaccine (SHINGRIX) 50 MCG/0.5ML SUSR injection Inject 0.5 mLs into the muscle once for 1 dose - dose #2 indicated 2-6 months after dose #1 Yes [place], MD   ipratropium (ATROVENT) 0.06 % nasal spray 2 sprays by Nasal route 3 times daily for 14 days Yes Cristin Neville MD   benzonatate (TESSALON) 100 MG capsule Take 1 capsule by mouth 3 times daily as needed for Cough Yes [place], MD   oxyCODONE-acetaminophen (PERCOCET) 5-325 MG per tablet Take 1 tablet by mouth every 6 hours as needed. . Yes Historical Provider, MD   acetaminophen (TYLENOL) 500 MG tablet Take 500 mg by mouth every 6 hours as needed for Pain Yes Historical Provider, MD   docusate sodium (COLACE) 100 MG capsule Take 100 mg by mouth daily as needed for Constipation Yes Historical Provider, MD   aspirin 81 MG tablet Take 81 mg by mouth 2 times daily Following ortho surgery Yes Historical Provider, MD   levothyroxine (SYNTHROID) 50 MCG tablet Take 1 tablet by mouth Daily Yes [place], MD   triamterene-hydrochlorothiazide (DYAZIDE) 37.5-25 MG per capsule Take 1 capsule by mouth daily Yes [place], MD   metoprolol succinate (TOPROL XL) 25 MG extended carotid bruits  Abdomen: soft, non-tender, non-distended, normal bowel sounds, no masses or organomegaly  Extremities: no edema    Patient's complete Health Risk Assessment and screening values have been reviewed and are found in Flowsheets. The following problems were reviewed today and where indicated follow up appointments were made and/or referrals ordered. Positive Risk Factor Screenings with Interventions:     General Health:  General  In general, how would you say your health is?: Very Good  In the past 7 days, have you experienced any of the following?: (!) New or Increased Pain (recently had a knee replacement)  Do you get the social and emotional support that you need?: Yes  Do you have a Living Will?: Yes  General Health Risk Interventions:  · Pain issues: patient declines any further evaluation/treatment for this issue, she is currently in physical therapy for rehab and has been referred to pain management    Health Habits/Nutrition:  Health Habits/Nutrition  Do you exercise for at least 20 minutes 2-3 times per week?: Yes  Have you lost any weight without trying in the past 3 months?: (!) Yes (due to appetite change after knee replacement)  Do you eat fewer than 2 meals per day?: No  Have you seen a dentist within the past year?: Yes  Body mass index is 35.26 kg/m².   Health Habits/Nutrition Interventions:  · Nutritional issues:  educational materials for healthy, well-balanced diet provided    Hearing/Vision:  Hearing/Vision  Do you or your family notice any trouble with your hearing?: (!) Yes  Do you have difficulty driving, watching TV, or doing any of your daily activities because of your eyesight?: No  Have you had an eye exam within the past year?: Yes  Hearing/Vision Interventions:  · Hearing concerns:  patient declines any further evaluation/treatment for hearing issues    Safety:  Safety  Do you have working smoke detectors?: Yes  Have all throw rugs been removed or fastened?: Yes  Do you have non-slip mats in all bathtubs?: (!) No (uses a shower bench and  handle)  Do all of your stairways have a railing or banister?: Yes  Are your doorways, halls and stairs free of clutter?: Yes  Do you always fasten your seatbelt when you are in a car?: Yes  Safety Interventions:  · Home safety tips provided    Personalized Preventive Plan   Current Health Maintenance Status  Immunization History   Administered Date(s) Administered    Influenza, High Dose (Fluzone 65 yrs and older) 10/06/2015, 12/23/2016, 12/18/2017, 10/08/2018    Pneumococcal 13-valent Conjugate ([id number]) 12/23/2016    Pneumococcal Polysaccharide ([id number]) 05/17/2011    Tdap (Boostrix, Adacel) 12/18/2017        Health Maintenance   Topic Date Due    Shingles Vaccine (1 of 2 - 2 Dose Series) 03/20/1990    Potassium monitoring  10/02/2019    Creatinine monitoring  10/02/2019    TSH testing  10/10/2019    DTaP/Tdap/Td vaccine (2 - Td) 12/18/2027    DEXA (modify frequency per FRAX score)  Addressed    Flu vaccine  Completed    Pneumococcal low/med risk  Completed     Recommendations for Preventive Services Due: see orders and patient instructions/AVS.  . Recommended screening schedule for the next 5-10 years is provided to the patient in written form: see Patient Instructions/AVS.    Andrés Vo was seen today for medicare aw. Diagnoses and all orders for this visit:    Routine general medical examination at a health care facility    Need for vaccination  -     zoster recombinant adjuvanted vaccine [facility]) 50 MCG/0.5ML SUSR injection; Inject 0.5 mLs into the muscle once for 1 dose - dose #2 indicated 2-6 months after dose #1    Allergic rhinitis, unspecified seasonality, unspecified trigger  -     ipratropium (ATROVENT) 0.06 % nasal spray; 2 sprays by Nasal route 3 times daily for 14 days    Cough  -     benzonatate (TESSALON) 100 MG capsule;  Take 1 capsule by mouth 3 times daily as needed for Cough

## 2018-12-06 DIAGNOSIS — E03.9 HYPOTHYROIDISM, UNSPECIFIED TYPE: Chronic | ICD-10-CM

## 2018-12-06 RX ORDER — LEVOTHYROXINE SODIUM 0.05 MG/1
50 TABLET ORAL DAILY
Qty: 30 TABLET | Refills: 5 | Status: SHIPPED | OUTPATIENT
Start: 2018-12-06 | End: 2019-02-07 | Stop reason: SDUPTHER

## 2019-02-07 DIAGNOSIS — I10 ESSENTIAL HYPERTENSION: Chronic | ICD-10-CM

## 2019-02-07 DIAGNOSIS — E03.9 HYPOTHYROIDISM, UNSPECIFIED TYPE: Chronic | ICD-10-CM

## 2019-02-07 RX ORDER — METOPROLOL SUCCINATE 25 MG/1
TABLET, EXTENDED RELEASE ORAL
Qty: 90 TABLET | Refills: 1 | Status: SHIPPED | OUTPATIENT
Start: 2019-02-07 | End: 2019-07-04 | Stop reason: SDUPTHER

## 2019-02-07 RX ORDER — LEVOTHYROXINE SODIUM 0.05 MG/1
50 TABLET ORAL DAILY
Qty: 90 TABLET | Refills: 1 | Status: SHIPPED | OUTPATIENT
Start: 2019-02-07 | End: 2019-07-04 | Stop reason: SDUPTHER

## 2019-06-29 ENCOUNTER — TELEPHONE (OUTPATIENT)
Dept: FAMILY MEDICINE CLINIC | Age: 79
End: 2019-06-29

## 2019-07-01 RX ORDER — TRIAMTERENE AND HYDROCHLOROTHIAZIDE 37.5; 25 MG/1; MG/1
1 CAPSULE ORAL DAILY
Qty: 90 CAPSULE | Refills: 0 | Status: SHIPPED | OUTPATIENT
Start: 2019-07-01 | End: 2019-09-29 | Stop reason: SDUPTHER

## 2019-07-04 DIAGNOSIS — I10 ESSENTIAL HYPERTENSION: Chronic | ICD-10-CM

## 2019-07-04 DIAGNOSIS — E03.9 HYPOTHYROIDISM, UNSPECIFIED TYPE: Chronic | ICD-10-CM

## 2019-07-05 DIAGNOSIS — I10 ESSENTIAL HYPERTENSION: Chronic | ICD-10-CM

## 2019-07-05 RX ORDER — METOPROLOL SUCCINATE 25 MG/1
25 TABLET, EXTENDED RELEASE ORAL DAILY
Qty: 30 TABLET | Refills: 1 | Status: SHIPPED | OUTPATIENT
Start: 2019-07-05 | End: 2019-07-12 | Stop reason: SDUPTHER

## 2019-07-05 RX ORDER — METOPROLOL SUCCINATE 25 MG/1
25 TABLET, EXTENDED RELEASE ORAL DAILY
Qty: 90 TABLET | Refills: 1 | Status: SHIPPED | OUTPATIENT
Start: 2019-07-05 | End: 2020-01-03

## 2019-07-05 RX ORDER — LEVOTHYROXINE SODIUM 0.05 MG/1
50 TABLET ORAL DAILY
Qty: 90 TABLET | Refills: 1 | Status: SHIPPED | OUTPATIENT
Start: 2019-07-05 | End: 2020-01-03

## 2019-07-12 ENCOUNTER — OFFICE VISIT (OUTPATIENT)
Dept: FAMILY MEDICINE CLINIC | Age: 79
End: 2019-07-12
Payer: MEDICARE

## 2019-07-12 VITALS
BODY MASS INDEX: 39.12 KG/M2 | DIASTOLIC BLOOD PRESSURE: 82 MMHG | HEIGHT: 62 IN | RESPIRATION RATE: 15 BRPM | WEIGHT: 212.6 LBS | TEMPERATURE: 98.6 F | SYSTOLIC BLOOD PRESSURE: 128 MMHG | HEART RATE: 99 BPM | OXYGEN SATURATION: 95 %

## 2019-07-12 DIAGNOSIS — Z85.3 HISTORY OF BREAST CANCER IN FEMALE: Chronic | ICD-10-CM

## 2019-07-12 DIAGNOSIS — I10 ESSENTIAL HYPERTENSION: Primary | Chronic | ICD-10-CM

## 2019-07-12 DIAGNOSIS — Z12.39 SCREENING FOR BREAST CANCER: ICD-10-CM

## 2019-07-12 DIAGNOSIS — E03.9 HYPOTHYROIDISM, UNSPECIFIED TYPE: Chronic | ICD-10-CM

## 2019-07-12 DIAGNOSIS — K21.9 GASTROESOPHAGEAL REFLUX DISEASE, ESOPHAGITIS PRESENCE NOT SPECIFIED: Chronic | ICD-10-CM

## 2019-07-12 DIAGNOSIS — E78.5 HYPERLIPIDEMIA, UNSPECIFIED HYPERLIPIDEMIA TYPE: Chronic | ICD-10-CM

## 2019-07-12 PROCEDURE — 99214 OFFICE O/P EST MOD 30 MIN: CPT | Performed by: FAMILY MEDICINE

## 2019-07-12 ASSESSMENT — PATIENT HEALTH QUESTIONNAIRE - PHQ9
1. LITTLE INTEREST OR PLEASURE IN DOING THINGS: 0
SUM OF ALL RESPONSES TO PHQ QUESTIONS 1-9: 0
2. FEELING DOWN, DEPRESSED OR HOPELESS: 0
SUM OF ALL RESPONSES TO PHQ9 QUESTIONS 1 & 2: 0
SUM OF ALL RESPONSES TO PHQ QUESTIONS 1-9: 0

## 2019-07-12 ASSESSMENT — ENCOUNTER SYMPTOMS
ABDOMINAL PAIN: 0
VOMITING: 0
WHEEZING: 0
NAUSEA: 0
SHORTNESS OF BREATH: 0
CHEST TIGHTNESS: 0
DIARRHEA: 0
CONSTIPATION: 0

## 2019-07-12 NOTE — PATIENT INSTRUCTIONS
A serving is 1 slice of bread, 1 ounce of dry cereal, or ½ cup of cooked rice, pasta, or cooked cereal. Try to choose whole-grain products as much as possible. · Limit lean meat, poultry, and fish to 2 servings each day. A serving is 3 ounces, about the size of a deck of cards. · Eat 4 to 5 servings of nuts, seeds, and legumes (cooked dried beans, lentils, and split peas) each week. A serving is 1/3 cup of nuts, 2 tablespoons of seeds, or ½ cup of cooked beans or peas. · Limit fats and oils to 2 to 3 servings each day. A serving is 1 teaspoon of vegetable oil or 2 tablespoons of salad dressing. · Limit sweets and added sugars to 5 servings or less a week. A serving is 1 tablespoon jelly or jam, ½ cup sorbet, or 1 cup of lemonade. · Eat less than 2,300 milligrams (mg) of sodium a day. If you limit your sodium to 1,500 mg a day, you can lower your blood pressure even more. Tips for success  · Start small. Do not try to make dramatic changes to your diet all at once. You might feel that you are missing out on your favorite foods and then be more likely to not follow the plan. Make small changes, and stick with them. Once those changes become habit, add a few more changes. · Try some of the following:  ? Make it a goal to eat a fruit or vegetable at every meal and at snacks. This will make it easy to get the recommended amount of fruits and vegetables each day. ? Try yogurt topped with fruit and nuts for a snack or healthy dessert. ? Add lettuce, tomato, cucumber, and onion to sandwiches. ? Combine a ready-made pizza crust with low-fat mozzarella cheese and lots of vegetable toppings. Try using tomatoes, squash, spinach, broccoli, carrots, cauliflower, and onions. ? Have a variety of cut-up vegetables with a low-fat dip as an appetizer instead of chips and dip. ? Sprinkle sunflower seeds or chopped almonds over salads. Or try adding chopped walnuts or almonds to cooked vegetables.   ? Try some vegetarian meals using beans and peas. Add garbanzo or kidney beans to salads. Make burritos and tacos with mashed al beans or black beans. Where can you learn more? Go to https://[email or web address]. org and sign in to your Seastar Gamest account. Enter W750 in the CoCubes.com box to learn more about \"DASH Diet: Care Instructions. \"     If you do not have an account, please click on the \"Sign Up Now\" link. Current as of: July 22, 2018  Content Version: 12.0  © 6918-9137 Healthwise, Incorporated. Care instructions adapted under license by [facility] ([facility]). If you have questions about a medical condition or this instruction, always ask your healthcare professional. [address] any warranty or liability for your use of this information.

## 2019-07-13 ENCOUNTER — HOSPITAL ENCOUNTER (OUTPATIENT)
Dept: LAB | Age: 79
Discharge: HOME | End: 2019-07-13
Payer: MEDICARE

## 2019-07-13 DIAGNOSIS — E78.5 HYPERLIPIDEMIA, UNSPECIFIED HYPERLIPIDEMIA TYPE: Chronic | ICD-10-CM

## 2019-07-13 DIAGNOSIS — I10 ESSENTIAL HYPERTENSION: Chronic | ICD-10-CM

## 2019-07-13 DIAGNOSIS — E03.9 HYPOTHYROIDISM, UNSPECIFIED TYPE: Chronic | ICD-10-CM

## 2019-07-13 LAB
ALBUMIN SERPL-MCNC: 4.1 G/DL (ref 3.5–4.6)
ALP BLD-CCNC: 93 U/L (ref 40–130)
ALT SERPL-CCNC: 17 U/L (ref 0–33)
ANION GAP SERPL CALCULATED.3IONS-SCNC: 11 MEQ/L (ref 9–15)
AST SERPL-CCNC: 19 U/L (ref 0–35)
BASOPHILS ABSOLUTE: 0.1 K/UL (ref 0–0.2)
BASOPHILS RELATIVE PERCENT: 0.9 %
BILIRUB SERPL-MCNC: 0.5 MG/DL (ref 0.2–0.7)
BUN BLDV-MCNC: 22 MG/DL (ref 8–23)
CALCIUM SERPL-MCNC: 9.9 MG/DL (ref 8.5–9.9)
CHLORIDE BLD-SCNC: 104 MEQ/L (ref 95–107)
CHOLESTEROL, TOTAL: 204 MG/DL (ref 0–199)
CO2: 28 MEQ/L (ref 20–31)
CREAT SERPL-MCNC: 0.69 MG/DL (ref 0.5–0.9)
EOSINOPHILS ABSOLUTE: 0.2 K/UL (ref 0–0.7)
EOSINOPHILS RELATIVE PERCENT: 2.6 %
GFR AFRICAN AMERICAN: >60
GFR NON-AFRICAN AMERICAN: >60
GLOBULIN: 3.2 G/DL (ref 2.3–3.5)
GLUCOSE BLD-MCNC: 101 MG/DL (ref 70–99)
HCT VFR BLD CALC: 40.3 % (ref 37–47)
HDLC SERPL-MCNC: 42 MG/DL (ref 40–59)
HEMOGLOBIN: 13.3 G/DL (ref 12–16)
LDL CHOLESTEROL CALCULATED: 128 MG/DL (ref 0–129)
LYMPHOCYTES ABSOLUTE: 1 K/UL (ref 1–4.8)
LYMPHOCYTES RELATIVE PERCENT: 14.2 %
MCH RBC QN AUTO: 28.6 PG (ref 27–31.3)
MCHC RBC AUTO-ENTMCNC: 33.1 % (ref 33–37)
MCV RBC AUTO: 86.5 FL (ref 82–100)
MONOCYTES ABSOLUTE: 0.6 K/UL (ref 0.2–0.8)
MONOCYTES RELATIVE PERCENT: 8.9 %
NEUTROPHILS ABSOLUTE: 4.9 K/UL (ref 1.4–6.5)
NEUTROPHILS RELATIVE PERCENT: 73.4 %
PDW BLD-RTO: 13.7 % (ref 11.5–14.5)
PLATELET # BLD: 276 K/UL (ref 130–400)
POTASSIUM SERPL-SCNC: 3.8 MEQ/L (ref 3.4–4.9)
RBC # BLD: 4.65 M/UL (ref 4.2–5.4)
SODIUM BLD-SCNC: 143 MEQ/L (ref 135–144)
T4 FREE: 1.46 NG/DL (ref 0.84–1.68)
TOTAL PROTEIN: 7.3 G/DL (ref 6.3–8)
TRIGL SERPL-MCNC: 170 MG/DL (ref 0–150)
TSH SERPL DL<=0.05 MIU/L-ACNC: 2.26 UIU/ML (ref 0.44–3.86)
WBC # BLD: 6.7 K/UL (ref 4.8–10.8)

## 2019-07-13 PROCEDURE — 80061 LIPID PANEL: CPT

## 2019-07-13 PROCEDURE — 85025 COMPLETE CBC W/AUTO DIFF WBC: CPT

## 2019-07-13 PROCEDURE — 36415 COLL VENOUS BLD VENIPUNCTURE: CPT

## 2019-07-13 PROCEDURE — 84443 ASSAY THYROID STIM HORMONE: CPT

## 2019-07-13 PROCEDURE — 84439 ASSAY OF FREE THYROXINE: CPT

## 2019-07-13 PROCEDURE — 80053 COMPREHEN METABOLIC PANEL: CPT

## 2019-07-16 DIAGNOSIS — R73.01 IMPAIRED FASTING GLUCOSE: Primary | ICD-10-CM

## 2019-07-17 ENCOUNTER — HOSPITAL ENCOUNTER (OUTPATIENT)
Dept: LAB | Age: 79
Discharge: HOME | End: 2019-07-17
Payer: MEDICARE

## 2019-07-17 DIAGNOSIS — R73.01 IMPAIRED FASTING GLUCOSE: ICD-10-CM

## 2019-07-17 DIAGNOSIS — E78.5 HYPERLIPIDEMIA, UNSPECIFIED HYPERLIPIDEMIA TYPE: Primary | Chronic | ICD-10-CM

## 2019-07-17 LAB — HBA1C MFR BLD: 5.9 % (ref 4.8–5.9)

## 2019-07-17 PROCEDURE — 36415 COLL VENOUS BLD VENIPUNCTURE: CPT

## 2019-07-17 PROCEDURE — 83036 HEMOGLOBIN GLYCOSYLATED A1C: CPT

## 2019-07-17 RX ORDER — EZETIMIBE 10 MG/1
10 TABLET ORAL DAILY
Qty: 90 TABLET | Refills: 1 | Status: SHIPPED | OUTPATIENT
Start: 2019-07-17 | End: 2020-01-09 | Stop reason: SDUPTHER

## 2019-07-23 PROBLEM — R73.03 PRE-DIABETES: Status: ACTIVE | Noted: 2019-07-23

## 2019-08-26 ENCOUNTER — OFFICE VISIT (OUTPATIENT)
Dept: FAMILY MEDICINE CLINIC | Age: 79
End: 2019-08-26
Payer: MEDICARE

## 2019-08-26 ENCOUNTER — TELEPHONE (OUTPATIENT)
Dept: FAMILY MEDICINE CLINIC | Age: 79
End: 2019-08-26

## 2019-08-26 VITALS
SYSTOLIC BLOOD PRESSURE: 142 MMHG | HEIGHT: 62 IN | HEART RATE: 94 BPM | DIASTOLIC BLOOD PRESSURE: 84 MMHG | WEIGHT: 213.6 LBS | BODY MASS INDEX: 39.31 KG/M2 | TEMPERATURE: 96.8 F

## 2019-08-26 DIAGNOSIS — I10 ESSENTIAL HYPERTENSION: ICD-10-CM

## 2019-08-26 DIAGNOSIS — R19.5 LOOSE STOOLS: Primary | ICD-10-CM

## 2019-08-26 DIAGNOSIS — R19.7 DIARRHEA, UNSPECIFIED TYPE: ICD-10-CM

## 2019-08-26 PROCEDURE — 99213 OFFICE O/P EST LOW 20 MIN: CPT | Performed by: FAMILY MEDICINE

## 2019-08-26 ASSESSMENT — ENCOUNTER SYMPTOMS
NAUSEA: 0
SHORTNESS OF BREATH: 0
WHEEZING: 0
ANAL BLEEDING: 0
CHEST TIGHTNESS: 0
VOMITING: 0
DIARRHEA: 1
CONSTIPATION: 0
ABDOMINAL DISTENTION: 0
ABDOMINAL PAIN: 0
BLOOD IN STOOL: 0
RECTAL PAIN: 0

## 2019-08-30 ENCOUNTER — HOSPITAL ENCOUNTER (OUTPATIENT)
Age: 79
Setting detail: SPECIMEN
Discharge: HOME | End: 2019-08-30
Payer: MEDICARE

## 2019-08-30 ENCOUNTER — TELEPHONE (OUTPATIENT)
Dept: FAMILY MEDICINE CLINIC | Age: 79
End: 2019-08-30

## 2019-08-30 DIAGNOSIS — R19.5 LOOSE STOOLS: ICD-10-CM

## 2019-08-30 DIAGNOSIS — R19.7 DIARRHEA, UNSPECIFIED TYPE: ICD-10-CM

## 2019-08-30 LAB — LACTOFERRIN, FECAL: POSITIVE

## 2019-08-30 PROCEDURE — 87328 CRYPTOSPORIDIUM AG IA: CPT

## 2019-08-30 PROCEDURE — 87506 IADNA-DNA/RNA PROBE TQ 6-11: CPT

## 2019-08-30 PROCEDURE — 83630 LACTOFERRIN FECAL (QUAL): CPT

## 2019-08-30 PROCEDURE — 87449 NOS EACH ORGANISM AG IA: CPT

## 2019-08-30 PROCEDURE — 87329 GIARDIA AG IA: CPT

## 2019-08-30 PROCEDURE — 82274 ASSAY TEST FOR BLOOD FECAL: CPT

## 2019-08-30 PROCEDURE — 87324 CLOSTRIDIUM AG IA: CPT

## 2019-08-31 LAB
C DIFF TOXIN/ANTIGEN: NORMAL
CRYPTOSPORIDIUM ANTIGEN STOOL: NORMAL
GI BACTERIAL PATHOGENS BY PCR: NORMAL
GIARDIA ANTIGEN STOOL: NORMAL
HEMOCCULT STL QL: NORMAL

## 2019-09-03 ENCOUNTER — TELEPHONE (OUTPATIENT)
Dept: FAMILY MEDICINE CLINIC | Age: 79
End: 2019-09-03

## 2019-09-03 ENCOUNTER — NURSE ONLY (OUTPATIENT)
Dept: FAMILY MEDICINE CLINIC | Age: 79
End: 2019-09-03

## 2019-09-03 VITALS — DIASTOLIC BLOOD PRESSURE: 82 MMHG | SYSTOLIC BLOOD PRESSURE: 130 MMHG

## 2019-09-03 DIAGNOSIS — I10 ESSENTIAL HYPERTENSION: Primary | ICD-10-CM

## 2019-09-03 NOTE — TELEPHONE ENCOUNTER
Patient in the office today for a nurse BP check. Patient states that does check BP at home  Readings are   167/87  136/94  138/90      Patient is adherent to a low sodium diet. Patient does exercise for at least 20 min 3-5 days per week      BP today was 130/82.       BP Readings from Last 3 Encounters:   09/03/19 130/82   08/26/19 (!) 142/84   07/12/19 128/82         Please advise

## 2019-09-05 ENCOUNTER — HOSPITAL ENCOUNTER (OUTPATIENT)
Dept: WOMENS IMAGING | Age: 79
Discharge: HOME | End: 2019-09-07
Payer: MEDICARE

## 2019-09-05 DIAGNOSIS — R19.5 LOOSE STOOLS: Primary | ICD-10-CM

## 2019-09-05 DIAGNOSIS — Z12.39 SCREENING FOR BREAST CANCER: ICD-10-CM

## 2019-09-05 DIAGNOSIS — R19.7 DIARRHEA, UNSPECIFIED TYPE: ICD-10-CM

## 2019-09-05 DIAGNOSIS — Z85.3 HISTORY OF BREAST CANCER IN FEMALE: Chronic | ICD-10-CM

## 2019-09-05 PROCEDURE — 77067 SCR MAMMO BI INCL CAD: CPT

## 2019-11-01 ENCOUNTER — OFFICE VISIT (OUTPATIENT)
Dept: GASTROENTEROLOGY | Age: 79
End: 2019-11-01
Payer: MEDICARE

## 2019-11-01 VITALS
HEART RATE: 78 BPM | BODY MASS INDEX: 39.38 KG/M2 | DIASTOLIC BLOOD PRESSURE: 80 MMHG | SYSTOLIC BLOOD PRESSURE: 132 MMHG | TEMPERATURE: 96.9 F | OXYGEN SATURATION: 98 % | WEIGHT: 214 LBS | HEIGHT: 62 IN

## 2019-11-01 DIAGNOSIS — R19.7 DIARRHEA, UNSPECIFIED TYPE: Primary | ICD-10-CM

## 2019-11-01 PROCEDURE — 99203 OFFICE O/P NEW LOW 30 MIN: CPT | Performed by: INTERNAL MEDICINE

## 2019-11-01 RX ORDER — LOPERAMIDE HYDROCHLORIDE 2 MG/1
2 CAPSULE ORAL 4 TIMES DAILY PRN
COMMUNITY

## 2019-11-04 DIAGNOSIS — R19.7 DIARRHEA, UNSPECIFIED TYPE: ICD-10-CM

## 2019-11-04 LAB — C DIFF TOXIN/ANTIGEN: NORMAL

## 2019-11-05 LAB
POTASSIUM, STOOL: NORMAL MMOL/L
SODIUM, FECAL: NORMAL MMOL/L

## 2019-11-06 LAB
CALPROTECTIN: 36 UG/G
FECAL PH: 7 (ref 5–8.5)

## 2019-11-12 ENCOUNTER — TELEPHONE (OUTPATIENT)
Dept: FAMILY MEDICINE CLINIC | Age: 79
End: 2019-11-12

## 2019-12-13 ENCOUNTER — HOSPITAL ENCOUNTER (OUTPATIENT)
Dept: LAB | Age: 79
Discharge: HOME | End: 2019-12-13
Payer: MEDICARE

## 2019-12-13 DIAGNOSIS — R19.7 DIARRHEA, UNSPECIFIED TYPE: ICD-10-CM

## 2019-12-13 DIAGNOSIS — R19.5 LOOSE STOOLS: ICD-10-CM

## 2019-12-13 LAB
ALBUMIN SERPL-MCNC: 4 G/DL (ref 3.5–4.6)
ALP BLD-CCNC: 74 U/L (ref 40–130)
ALT SERPL-CCNC: 18 U/L (ref 0–33)
ANION GAP SERPL CALCULATED.3IONS-SCNC: 13 MEQ/L (ref 9–15)
AST SERPL-CCNC: 19 U/L (ref 0–35)
BASOPHILS ABSOLUTE: 0.1 K/UL (ref 0–0.2)
BASOPHILS RELATIVE PERCENT: 0.9 %
BILIRUB SERPL-MCNC: 0.5 MG/DL (ref 0.2–0.7)
BUN BLDV-MCNC: 15 MG/DL (ref 8–23)
CALCIUM SERPL-MCNC: 9.8 MG/DL (ref 8.5–9.9)
CHLORIDE BLD-SCNC: 98 MEQ/L (ref 95–107)
CO2: 28 MEQ/L (ref 20–31)
CREAT SERPL-MCNC: 0.64 MG/DL (ref 0.5–0.9)
EOSINOPHILS ABSOLUTE: 0.1 K/UL (ref 0–0.7)
EOSINOPHILS RELATIVE PERCENT: 1.9 %
GFR AFRICAN AMERICAN: >60
GFR NON-AFRICAN AMERICAN: >60
GLOBULIN: 3.3 G/DL (ref 2.3–3.5)
GLUCOSE BLD-MCNC: 116 MG/DL (ref 70–99)
HCT VFR BLD CALC: 41.9 % (ref 37–47)
HEMOGLOBIN: 13.7 G/DL (ref 12–16)
LYMPHOCYTES ABSOLUTE: 1.1 K/UL (ref 1–4.8)
LYMPHOCYTES RELATIVE PERCENT: 17.7 %
MCH RBC QN AUTO: 28.6 PG (ref 27–31.3)
MCHC RBC AUTO-ENTMCNC: 32.6 % (ref 33–37)
MCV RBC AUTO: 87.5 FL (ref 82–100)
MONOCYTES ABSOLUTE: 0.6 K/UL (ref 0.2–0.8)
MONOCYTES RELATIVE PERCENT: 10.3 %
NEUTROPHILS ABSOLUTE: 4.2 K/UL (ref 1.4–6.5)
NEUTROPHILS RELATIVE PERCENT: 69.2 %
PDW BLD-RTO: 13.4 % (ref 11.5–14.5)
PLATELET # BLD: 315 K/UL (ref 130–400)
POTASSIUM SERPL-SCNC: 3.7 MEQ/L (ref 3.4–4.9)
RBC # BLD: 4.79 M/UL (ref 4.2–5.4)
SODIUM BLD-SCNC: 139 MEQ/L (ref 135–144)
TOTAL PROTEIN: 7.3 G/DL (ref 6.3–8)
TSH SERPL DL<=0.05 MIU/L-ACNC: 1.8 UIU/ML (ref 0.44–3.86)
WBC # BLD: 6.1 K/UL (ref 4.8–10.8)

## 2019-12-13 PROCEDURE — 84443 ASSAY THYROID STIM HORMONE: CPT

## 2019-12-13 PROCEDURE — 80053 COMPREHEN METABOLIC PANEL: CPT

## 2019-12-13 PROCEDURE — 85025 COMPLETE CBC W/AUTO DIFF WBC: CPT

## 2019-12-13 PROCEDURE — 36415 COLL VENOUS BLD VENIPUNCTURE: CPT

## 2020-01-03 ENCOUNTER — OFFICE VISIT (OUTPATIENT)
Dept: GASTROENTEROLOGY | Age: 80
End: 2020-01-03
Payer: MEDICARE

## 2020-01-03 VITALS
SYSTOLIC BLOOD PRESSURE: 126 MMHG | WEIGHT: 213 LBS | OXYGEN SATURATION: 96 % | HEIGHT: 62 IN | DIASTOLIC BLOOD PRESSURE: 84 MMHG | BODY MASS INDEX: 39.2 KG/M2 | HEART RATE: 65 BPM

## 2020-01-03 PROCEDURE — 99213 OFFICE O/P EST LOW 20 MIN: CPT | Performed by: NURSE PRACTITIONER

## 2020-01-03 NOTE — PROGRESS NOTES
Gastroenterology Clinic Follow up Visit    Pam Ferreira  [id number]  Chief Complaint   Patient presents with    Follow-up     HPI: Last seen in GI clinic on 11/1/19 with mild persistent loose stool/diarrhea. Interval change: Patient reports she is now having a daily, soft, formed BM. Occasionally with have a BM every other day. Patient reports she is taking 1/2 an imodium at night. She is using Metamucil in the morning, along with probiotics. She reports this regimen has resolved her symptoms. No abdominal pain, N/V. She denies melena or hematochezia. Stool calprotectin was normal, indicating no active inflammation in the colon. She denies  weight loss or loss of appetite. No CP, SOB, or palpitations. No fever or chills. HPI and A/P at last visits summarized below:  with 3 to 4-month history of mild persistent loose stool/diarrhea. No alarm features identified. Does respond to Imodium. Previous stool studies incomplete, ova and parasites negative. Unclear etiology for symptoms, likely multifactorial but no alarm features identified.  - Calprotectin Stool; Future  - Clostridium difficile EIA; Future  - Potassium, stool; Future  - Sodium, stool; Future  - pH, stool; Future  -Continue with current symptomatic management with fiber, probiotics and Imodium as needed      Review of Systems   All other systems reviewed and are negative. Past medical history, past surgical history, medication list, social and familyhistory reviewed    Height 5' 2\" (1.575 m), weight 213 lb (96.6 kg), not currently breastfeeding. Physical Exam  Vitals signs reviewed. Constitutional:       General: She is not in acute distress. Appearance: She is well-developed. She is not diaphoretic. HENT:      Head: Normocephalic and atraumatic. Eyes:      General: No scleral icterus. Conjunctiva/sclera: Conjunctivae normal.      Pupils: Pupils are equal, round, and reactive to light.    Neck:      Musculoskeletal: Normal range of motion and neck supple. Cardiovascular:      Rate and Rhythm: Normal rate and regular rhythm. Heart sounds: Normal heart sounds. No murmur. Pulmonary:      Effort: Pulmonary effort is normal. No respiratory distress. Breath sounds: Normal breath sounds. No wheezing or rales. Chest:      Chest wall: No tenderness. Abdominal:      General: Bowel sounds are normal. There is no distension. Palpations: Abdomen is soft. There is no mass. Tenderness: There is no tenderness. There is no guarding or rebound. Comments: Central obesity    Musculoskeletal: Normal range of motion. Lymphadenopathy:      Cervical: No cervical adenopathy. Skin:     General: Skin is warm and dry. Coloration: Skin is not pale. Findings: No erythema or rash. Neurological:      Mental Status: She is alert and oriented to person, place, and time. Psychiatric:         Behavior: Behavior normal.         Thought Content: Thought content normal.         Judgment: Judgment normal.         Laboratory, Pathology, Radiology reviewed in detail with relevantimportant investigations summarized below:    Recent Labs     12/13/19  1230   WBC 6.1   HGB 13.7   HCT 41.9   MCV 87.5        Lab Results   Component Value Date    ALT 18 12/13/2019    AST 19 12/13/2019    ALKPHOS 74 12/13/2019    BILITOT 0.5 12/13/2019     Endoscopic investigations: Colonoscopy: 9/18/2014: sig hyp polyp       Assessment and Plan:  Ibrahimahugo Nba Jacek 78 y.o. female with functional diarrhea, that has resolved with fiber, probiotics and imodium. No alarming symptoms noted, such as weight loss or bleeding. Stool calprotectin was normal. Will continue current treatment plan. Return if symptoms worsen or fail to improve.     CRISTOPHER Friedman - [facility]    Please note this report has been partially produced using speech recognitionsoftware  and may cause contain errors related to that

## 2020-01-08 NOTE — TELEPHONE ENCOUNTER
Rx request   Requested Prescriptions     Pending Prescriptions Disp Refills    ezetimibe (ZETIA) 10 MG tablet 90 tablet 1     Sig: Take 1 tablet by mouth daily     LOV 8/26/2019  Next Visit Date:  Future Appointments   Date Time Provider Ariadna Bright   1/17/2020  1:00 PM [facility]

## 2020-01-09 ENCOUNTER — TELEPHONE (OUTPATIENT)
Dept: GASTROENTEROLOGY | Age: 80
End: 2020-01-09

## 2020-01-09 RX ORDER — EZETIMIBE 10 MG/1
10 TABLET ORAL DAILY
Qty: 90 TABLET | Refills: 1 | Status: SHIPPED | OUTPATIENT
Start: 2020-01-09 | End: 2020-04-08

## 2020-01-09 NOTE — TELEPHONE ENCOUNTER
The patient said when she went to the bathroom this morning it came out like diarrhea and she saw blood in her stool. It happened 2 times. Please advise [phone].

## 2020-01-10 ENCOUNTER — TELEPHONE (OUTPATIENT)
Dept: FAMILY MEDICINE CLINIC | Age: 80
End: 2020-01-10

## 2020-01-10 RX ORDER — HYDROCORTISONE ACETATE 25 MG/1
25 SUPPOSITORY RECTAL 2 TIMES DAILY PRN
Qty: 12 SUPPOSITORY | Refills: 1 | Status: SHIPPED | OUTPATIENT
Start: 2020-01-10 | End: 2020-05-04 | Stop reason: ALTCHOICE

## 2020-03-27 ENCOUNTER — TELEPHONE (OUTPATIENT)
Dept: FAMILY MEDICINE CLINIC | Age: 80
End: 2020-03-27

## 2020-03-27 RX ORDER — NYSTATIN 100000 U/G
CREAM TOPICAL
Qty: 60 G | Refills: 0 | Status: SHIPPED | OUTPATIENT
Start: 2020-03-27 | End: 2020-05-04 | Stop reason: ALTCHOICE

## 2020-03-27 NOTE — TELEPHONE ENCOUNTER
Patient called stating she has had a rash under hew breast area over the past week and has tried cortisone cream, gold bond powder which has not helped. She states it looks almost like a \"diaper rash\". Patient denies any fever, itching or pain. Patient uses [address] [place] [address], please advise.

## 2020-03-28 ENCOUNTER — TELEPHONE (OUTPATIENT)
Dept: FAMILY MEDICINE CLINIC | Age: 80
End: 2020-03-28

## 2020-03-31 RX ORDER — TRIAMTERENE AND HYDROCHLOROTHIAZIDE 37.5; 25 MG/1; MG/1
1 CAPSULE ORAL DAILY
Qty: 90 CAPSULE | Refills: 0 | Status: SHIPPED | OUTPATIENT
Start: 2020-03-31 | End: 2020-06-29

## 2020-05-04 ENCOUNTER — VIRTUAL VISIT (OUTPATIENT)
Dept: FAMILY MEDICINE CLINIC | Age: 80
End: 2020-05-04
Payer: MEDICARE

## 2020-05-04 PROBLEM — Z96.652 PRESENCE OF LEFT ARTIFICIAL KNEE JOINT: Status: ACTIVE | Noted: 2018-10-15

## 2020-05-04 PROBLEM — Z88.0 ALLERGY STATUS TO PENICILLIN: Status: ACTIVE | Noted: 2018-10-15

## 2020-05-04 PROBLEM — M41.9 SCOLIOSIS, UNSPECIFIED: Status: ACTIVE | Noted: 2018-10-15

## 2020-05-04 PROCEDURE — 99442 PR PHYS/QHP TELEPHONE EVALUATION 11-20 MIN: CPT | Performed by: FAMILY MEDICINE

## 2020-05-04 RX ORDER — IPRATROPIUM BROMIDE 42 UG/1
2 SPRAY, METERED NASAL 3 TIMES DAILY
Qty: 1 BOTTLE | Refills: 5 | Status: SHIPPED | OUTPATIENT
Start: 2020-05-04 | End: 2021-08-16

## 2020-05-04 ASSESSMENT — PATIENT HEALTH QUESTIONNAIRE - PHQ9
SUM OF ALL RESPONSES TO PHQ QUESTIONS 1-9: 0
SUM OF ALL RESPONSES TO PHQ QUESTIONS 1-9: 0

## 2020-05-04 ASSESSMENT — ENCOUNTER SYMPTOMS
SORE THROAT: 0
WHEEZING: 0
SHORTNESS OF BREATH: 0
NAUSEA: 0
BLOOD IN STOOL: 0
CONSTIPATION: 0
VOMITING: 0
CHEST TIGHTNESS: 0
RHINORRHEA: 1
SINUS PRESSURE: 0
COUGH: 1
ABDOMINAL PAIN: 0
ANAL BLEEDING: 0
TROUBLE SWALLOWING: 0
FACIAL SWELLING: 0
SINUS PAIN: 0
DIARRHEA: 0

## 2020-05-04 NOTE — PROGRESS NOTES
Abiola Gates is a [de-identified] y.o. female evaluated via telephone on 5/4/2020. Consent:  She and/or health care decision maker is aware that that she may receive a bill for this telephone service, depending on her insurance coverage, and has provided verbal consent to proceed: Yes      Documentation:    I communicated with the patient and/or health care decision maker about chronic hypertension, hyperlipidemia, and hypothyroid management. Details of this discussion including any medical advice provided:     Patient reports taking medications as prescribed since the most recent visit. They deny adhering to any specific lower carbohydrate or lower salt diet. They deny any routine exercise outside of normal day-to-day activity. Patient denies any chest pain, dyspnea, palpitations, lightheadedness, dizziness, worsening lower extremity edema, or syncope. Review of Systems   Constitutional: Negative for appetite change, chills, diaphoresis, fatigue, fever and unexpected weight change. HENT: Positive for postnasal drip and rhinorrhea. Negative for congestion, ear pain, facial swelling, sinus pressure, sinus pain, sneezing, sore throat and trouble swallowing. Eyes: Negative for visual disturbance. Respiratory: Positive for cough (infrequent, mild). Negative for chest tightness, shortness of breath and wheezing. Cardiovascular: Negative for chest pain, palpitations and leg swelling. No orthopnea, No PND   Gastrointestinal: Negative for abdominal pain, anal bleeding, blood in stool, constipation, diarrhea, nausea and vomiting. No heartburn, No melena   Endocrine: Negative for cold intolerance, heat intolerance, polydipsia, polyphagia and polyuria. Genitourinary: Negative for dysuria and hematuria. Musculoskeletal: Negative for myalgias. Skin: Negative for rash. Neurological: Negative for dizziness, syncope, weakness, light-headedness, numbness and headaches.

## 2020-09-29 RX ORDER — EZETIMIBE 10 MG/1
10 TABLET ORAL DAILY
Qty: 90 TABLET | Refills: 0 | Status: SHIPPED | OUTPATIENT
Start: 2020-09-29 | End: 2021-06-17

## 2020-09-29 NOTE — TELEPHONE ENCOUNTER
Patient is requesting refill  Rx request   Requested Prescriptions     Pending Prescriptions Disp Refills    ezetimibe (ZETIA) 10 MG tablet 90 tablet 1     Sig: Take 1 tablet by mouth daily     LOV 5/4/2020  Next Visit Date:  No future appointments.

## 2020-09-30 NOTE — TELEPHONE ENCOUNTER
Detailed message left on machine for patient to call back to set up appointment before refill runs out.

## 2020-10-05 ENCOUNTER — OFFICE VISIT (OUTPATIENT)
Dept: FAMILY MEDICINE CLINIC | Age: 80
End: 2020-10-05
Payer: MEDICARE

## 2020-10-05 VITALS
HEIGHT: 62 IN | WEIGHT: 220 LBS | OXYGEN SATURATION: 98 % | TEMPERATURE: 98 F | BODY MASS INDEX: 40.48 KG/M2 | HEART RATE: 110 BPM | SYSTOLIC BLOOD PRESSURE: 130 MMHG | DIASTOLIC BLOOD PRESSURE: 84 MMHG | RESPIRATION RATE: 16 BRPM

## 2020-10-05 PROCEDURE — 99213 OFFICE O/P EST LOW 20 MIN: CPT | Performed by: PHYSICIAN ASSISTANT

## 2020-10-05 RX ORDER — NYSTATIN 100000 [USP'U]/G
POWDER TOPICAL
Qty: 1 BOTTLE | Refills: 0 | Status: SHIPPED | OUTPATIENT
Start: 2020-10-05 | End: 2020-10-14

## 2020-10-05 RX ORDER — KETOCONAZOLE 20 MG/G
CREAM TOPICAL
Qty: 60 G | Refills: 1 | Status: SHIPPED | OUTPATIENT
Start: 2020-10-05 | End: 2020-10-22 | Stop reason: SDUPTHER

## 2020-10-05 RX ORDER — FLUCONAZOLE 150 MG/1
150 TABLET ORAL WEEKLY
Qty: 4 TABLET | Refills: 0 | Status: SHIPPED | OUTPATIENT
Start: 2020-10-05 | End: 2020-10-27

## 2020-10-05 ASSESSMENT — ENCOUNTER SYMPTOMS
EYE PAIN: 0
DIARRHEA: 0
NAIL CHANGES: 0
COUGH: 0

## 2020-10-05 NOTE — PROGRESS NOTES
Subjective     Narcisa Read [de-identified] y.o. female presents 10/9/20 with   Chief Complaint   Patient presents with    Rash     rash pelvic area       Rash   This is a new problem. The current episode started in the past 7 days. The problem is unchanged. The affected locations include the groin. The rash is characterized by itchiness and redness. She was exposed to nothing. Pertinent negatives include no anorexia, congestion, cough, diarrhea, eye pain, facial edema, fatigue or nail changes. Past treatments include anti-itch cream (Cortisone). The treatment provided no relief.        Reviewed the following history:    Past Medical History:   Diagnosis Date    Abnormal ultrasound of breast     Abscess of abdominal wall 11/5/2016    Bilateral carpal tunnel syndrome 4/23/2018    Bleeding hemorrhoid 3/17/2015    Breast cancer, right ([address] 2003    s/p partial mastectomy, radiation    Carbuncle of abdominal wall 11/5/2016    Ductal carcinoma in situ of breast     right    Fibrocystic disease of right breast     Generalized osteoarthritis 8/4/2014    GERD (gastroesophageal reflux disease)     History of breast cancer in female 8/4/2014    HTN (hypertension) 8/4/2014    Hyperlipidemia     Hypertension     Hypothyroidism 12/2/2016    Insomnia 6/16/2015    MRSA (methicillin resistant Staphylococcus aureus) infection     MRSA infection 11/2016    LLQ abdominal wall    Obesity     Postmenopausal 2/9/2017    Pre-diabetes 7/23/2019    Scoliosis     Subclinical hypothyroidism 5/6/2014    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 Review of Systems   Constitutional: Negative for fatigue. HENT: Negative for congestion. Eyes: Negative for pain. Respiratory: Negative for cough. Gastrointestinal: Negative for anorexia and diarrhea. Skin: Positive for rash. Negative for nail changes. Objective    Vitals:    10/05/20 1258 10/05/20 1302   BP: 130/86 130/84   Site: Right Upper Arm    Position: Sitting    Cuff Size: Large Adult    Pulse: 110    Resp: 16    Temp: 98 °F (36.7 °C)    SpO2: 98%    Weight: 220 lb (99.8 kg)    Height: 5' 2\" (1.575 m)        Physical Exam  Vitals signs and nursing note reviewed. Constitutional:       General: She is not in acute distress. Appearance: Normal appearance. HENT:      Head: Normocephalic and atraumatic. Right Ear: Tympanic membrane normal.      Left Ear: Tympanic membrane normal.      Nose: Nose normal.      Mouth/Throat:      Pharynx: No posterior oropharyngeal erythema. Eyes:      Conjunctiva/sclera: Conjunctivae normal.   Cardiovascular:      Rate and Rhythm: Normal rate and regular rhythm. Pulmonary:      Effort: Pulmonary effort is normal.      Breath sounds: Normal breath sounds. Skin:     Findings: Rash present. Neurological:      Mental Status: She is alert. Assessment and Plan      ICD-10-CM    1. Intertrigo  L30.4        Orders Placed This Encounter   Medications    ketoconazole (NIZORAL) 2 % cream     Sig: Apply topically twice daily for 2 - 4 weeks (or for 1 week after lesions have healed). Dispense:  60 g     Refill:  1    fluconazole (DIFLUCAN) 150 MG tablet     Sig: Take 1 tablet by mouth once a week for 4 doses     Dispense:  4 tablet     Refill:  0    nystatin (MYCOSTATIN) 817527 UNIT/GM powder     Sig: Apply 2 times daily.      Dispense:  1 Bottle     Refill:  0       Reviewed with the patient: current clinicalstatus, medications, activities

## 2020-10-05 NOTE — PATIENT INSTRUCTIONS
Patient Education        Yeast Skin Infection: Care Instructions  Your Care Instructions     Yeast normally lives on your skin. Sometimes too much yeast can overgrow in certain areas of the skin and cause an infection. The infection causes red, scaly, moist patches on your skin that may itch. Common areas for skin yeast infections are skin folds under the breasts or belly area. The warm and moist areas in the skin folds can make it easier for yeast to overgrow. Yeast infections also can be found on other parts of the body such as the groin or armpits. You will probably get a cream or ointment that contains an antifungal medicine. Examples of these are miconazole and clotrimazole. You put it on your skin to treat the infection. Your doctor may give you a prescription for the cream or ointment. Or you may be able to buy it without a prescription at most drugstores. If the infection is severe, the doctor will prescribe antifungal pills. A yeast infection usually goes away after about a week of treatment. But it's important to use the medicine for as long as your doctor tells you to. Follow-up care is a key part of your treatment and safety. Be sure to make and go to all appointments, and call your doctor if you are having problems. It's also a good idea to know your test results and keep a list of the medicines you take. How can you care for yourself at home? · Be safe with medicines. Take your medicines exactly as prescribed. Call your doctor if you think you are having a problem with your medicine. · Keep your skin clean and dry. Your doctor may suggest using powder that contains an antifungal medicine in the skin folds. · Wear loose clothing. When should you call for help? Call your doctor now or seek immediate medical care if:  · You have symptoms of infection, such as:  ? Increased pain, swelling, warmth, or redness. ? Red streaks leading from the area. ? Pus draining from the area. ?  A fever. Watch closely for changes in your health, and be sure to contact your doctor if:  · You do not get better as expected. Where can you learn more? Go to https://Salir.compepiceweb.yeppt. org and sign in to your Cerevo account. Enter B888 in the [facility] box to learn more about \"Yeast Skin Infection: Care Instructions. \"     If you do not have an account, please click on the \"Sign Up Now\" link. Current as of: October 31, 2019               Content Version: 12.5  © 9163-6033 Healthwise, Incorporated. Care instructions adapted under license by [facility] ([facility]). If you have questions about a medical condition or this instruction, always ask your healthcare professional. [address] any warranty or liability for your use of this information.

## 2020-10-15 ENCOUNTER — OFFICE VISIT (OUTPATIENT)
Dept: FAMILY MEDICINE CLINIC | Age: 80
End: 2020-10-15
Payer: MEDICARE

## 2020-10-15 ENCOUNTER — HOSPITAL ENCOUNTER (OUTPATIENT)
Dept: LAB | Age: 80
Discharge: HOME | End: 2020-10-15
Payer: MEDICARE

## 2020-10-15 VITALS
DIASTOLIC BLOOD PRESSURE: 84 MMHG | HEIGHT: 62 IN | SYSTOLIC BLOOD PRESSURE: 136 MMHG | RESPIRATION RATE: 14 BRPM | TEMPERATURE: 97.9 F | BODY MASS INDEX: 40.59 KG/M2 | WEIGHT: 220.6 LBS | OXYGEN SATURATION: 97 % | HEART RATE: 93 BPM

## 2020-10-15 PROBLEM — E66.01 MORBIDLY OBESE (HCC): Status: ACTIVE | Noted: 2020-10-15

## 2020-10-15 LAB
ALBUMIN SERPL-MCNC: 4 G/DL (ref 3.5–4.6)
ALP BLD-CCNC: 85 U/L (ref 40–130)
ALT SERPL-CCNC: 16 U/L (ref 0–33)
ANION GAP SERPL CALCULATED.3IONS-SCNC: 12 MEQ/L (ref 9–15)
AST SERPL-CCNC: 20 U/L (ref 0–35)
BASOPHILS ABSOLUTE: 0 K/UL (ref 0–0.2)
BASOPHILS RELATIVE PERCENT: 0.5 %
BILIRUB SERPL-MCNC: 0.3 MG/DL (ref 0.2–0.7)
BUN BLDV-MCNC: 23 MG/DL (ref 8–23)
CALCIUM SERPL-MCNC: 10.2 MG/DL (ref 8.5–9.9)
CHLORIDE BLD-SCNC: 100 MEQ/L (ref 95–107)
CHOLESTEROL, TOTAL: 211 MG/DL (ref 0–199)
CO2: 28 MEQ/L (ref 20–31)
CREAT SERPL-MCNC: 0.73 MG/DL (ref 0.5–0.9)
EOSINOPHILS ABSOLUTE: 0.2 K/UL (ref 0–0.7)
EOSINOPHILS RELATIVE PERCENT: 1.7 %
GFR AFRICAN AMERICAN: >60
GFR NON-AFRICAN AMERICAN: >60
GLOBULIN: 3.3 G/DL (ref 2.3–3.5)
GLUCOSE BLD-MCNC: 92 MG/DL (ref 70–99)
HBA1C MFR BLD: 6.1 % (ref 4.8–5.9)
HCT VFR BLD CALC: 42.6 % (ref 37–47)
HDLC SERPL-MCNC: 41 MG/DL (ref 40–59)
HEMOGLOBIN: 13.6 G/DL (ref 12–16)
LDL CHOLESTEROL CALCULATED: 112 MG/DL (ref 0–129)
LYMPHOCYTES ABSOLUTE: 1.1 K/UL (ref 1–4.8)
LYMPHOCYTES RELATIVE PERCENT: 12.2 %
MCH RBC QN AUTO: 27.9 PG (ref 27–31.3)
MCHC RBC AUTO-ENTMCNC: 31.9 % (ref 33–37)
MCV RBC AUTO: 87.5 FL (ref 82–100)
MONOCYTES ABSOLUTE: 0.8 K/UL (ref 0.2–0.8)
MONOCYTES RELATIVE PERCENT: 9.1 %
NEUTROPHILS ABSOLUTE: 7.1 K/UL (ref 1.4–6.5)
NEUTROPHILS RELATIVE PERCENT: 76.5 %
PDW BLD-RTO: 13.4 % (ref 11.5–14.5)
PLATELET # BLD: 316 K/UL (ref 130–400)
POTASSIUM SERPL-SCNC: 4.2 MEQ/L (ref 3.4–4.9)
RBC # BLD: 4.88 M/UL (ref 4.2–5.4)
SODIUM BLD-SCNC: 140 MEQ/L (ref 135–144)
T4 FREE: 1.4 NG/DL (ref 0.84–1.68)
TOTAL PROTEIN: 7.3 G/DL (ref 6.3–8)
TRIGL SERPL-MCNC: 288 MG/DL (ref 0–150)
TSH SERPL DL<=0.05 MIU/L-ACNC: 4.21 UIU/ML (ref 0.44–3.86)
WBC # BLD: 9.3 K/UL (ref 4.8–10.8)

## 2020-10-15 PROCEDURE — 84443 ASSAY THYROID STIM HORMONE: CPT

## 2020-10-15 PROCEDURE — 36415 COLL VENOUS BLD VENIPUNCTURE: CPT

## 2020-10-15 PROCEDURE — 83036 HEMOGLOBIN GLYCOSYLATED A1C: CPT

## 2020-10-15 PROCEDURE — G8510 SCR DEP NEG, NO PLAN REQD: HCPCS | Performed by: FAMILY MEDICINE

## 2020-10-15 PROCEDURE — 80061 LIPID PANEL: CPT

## 2020-10-15 PROCEDURE — 85025 COMPLETE CBC W/AUTO DIFF WBC: CPT

## 2020-10-15 PROCEDURE — 84439 ASSAY OF FREE THYROXINE: CPT

## 2020-10-15 PROCEDURE — 99214 OFFICE O/P EST MOD 30 MIN: CPT | Performed by: FAMILY MEDICINE

## 2020-10-15 PROCEDURE — 80053 COMPREHEN METABOLIC PANEL: CPT

## 2020-10-15 RX ORDER — NYSTATIN 100000 [USP'U]/G
POWDER TOPICAL
Qty: 15 G | Refills: 0 | Status: SHIPPED | OUTPATIENT
Start: 2020-10-15 | End: 2021-08-06

## 2020-10-15 ASSESSMENT — ENCOUNTER SYMPTOMS
BLOOD IN STOOL: 0
SHORTNESS OF BREATH: 0
ANAL BLEEDING: 0
WHEEZING: 1
CHEST TIGHTNESS: 0
CONSTIPATION: 0
VOMITING: 0
NAUSEA: 0
COUGH: 1
DIARRHEA: 0
ABDOMINAL PAIN: 0

## 2020-10-15 ASSESSMENT — PATIENT HEALTH QUESTIONNAIRE - PHQ9
1. LITTLE INTEREST OR PLEASURE IN DOING THINGS: 0
SUM OF ALL RESPONSES TO PHQ QUESTIONS 1-9: 0
SUM OF ALL RESPONSES TO PHQ QUESTIONS 1-9: 0
SUM OF ALL RESPONSES TO PHQ9 QUESTIONS 1 & 2: 0
2. FEELING DOWN, DEPRESSED OR HOPELESS: 0
SUM OF ALL RESPONSES TO PHQ QUESTIONS 1-9: 0

## 2020-10-15 NOTE — PATIENT INSTRUCTIONS
partner. · Keep your bedroom quiet, dark, and cool. Use curtains, blinds, or a sleep mask to block out light. To block out noise, use earplugs, soothing music, or a \"white noise\" machine. Your evening and bedtime routine   · Create a relaxing bedtime routine. You might want to take a warm shower or bath, listen to soothing music, or drink a cup of noncaffeinated tea. · Go to bed at the same time every night. And get up at the same time every morning, even if you feel tired. What to avoid   · Limit caffeine (coffee, tea, caffeinated sodas) during the day, and don't have any for at least 4 to 6 hours before bedtime. · Don't drink alcohol before bedtime. Alcohol can cause you to wake up more often during the night. · Don't smoke or use tobacco, especially in the evening. Nicotine can keep you awake. · Don't take naps during the day, especially close to bedtime. · Don't lie in bed awake for too long. If you can't fall asleep, or if you wake up in the middle of the night and can't get back to sleep within 15 minutes or so, get out of bed and go to another room until you feel sleepy. · Don't take medicine right before bed that may keep you awake or make you feel hyper or energized. Your doctor can tell you if your medicine may do this and if you can take it earlier in the day. If you can't sleep   · Imagine yourself in a peaceful, pleasant scene. Focus on the details and feelings of being in a place that is relaxing. · Get up and do a quiet or boring activity until you feel sleepy. · Don't drink any liquids after 6 p.m. if you wake up often because you have to go to the bathroom. Where can you learn more? Go to https://Cinelanphilip.SERPs. org and sign in to your NERITES account. Enter O682 in the [facility] box to learn more about \"Learning About Sleeping Well. \"     If you do not have an account, please click on the \"Sign Up Now\" link.   Current as of: January 31, How to watch the video    Scan the QR code   OR Visit the website    https://hwi. se/r/[email or web address]   Current as of: December 16, 2019               Content Version: 12.6  © 8993-4350 [employer] Incorporated. Care instructions adapted under license by [facility] ([facility]). If you have questions about a medical condition or this instruction, always ask your healthcare professional. [place] [address] any warranty or liability for your use of this information.

## 2020-10-19 ENCOUNTER — HOSPITAL ENCOUNTER (OUTPATIENT)
Dept: GENERAL RADIOLOGY | Age: 80
Discharge: HOME | End: 2020-10-21
Payer: MEDICARE

## 2020-10-19 ENCOUNTER — HOSPITAL ENCOUNTER (OUTPATIENT)
Dept: WOMENS IMAGING | Age: 80
Discharge: HOME | End: 2020-10-21
Payer: MEDICARE

## 2020-10-19 PROCEDURE — 71046 X-RAY EXAM CHEST 2 VIEWS: CPT

## 2020-10-19 PROCEDURE — 77063 BREAST TOMOSYNTHESIS BI: CPT

## 2020-10-21 ENCOUNTER — TELEPHONE (OUTPATIENT)
Dept: FAMILY MEDICINE CLINIC | Age: 80
End: 2020-10-21

## 2020-10-21 NOTE — TELEPHONE ENCOUNTER
Patient was seen on 10/15/2020, she spoke to you about not being able to sleep at night. She is calling to say that she believes it due to her urinating several times during the night (almost every hour, full bladder). Please advise, patient can be reached at [phone].  Beto Garcia

## 2020-10-21 NOTE — TELEPHONE ENCOUNTER
Initially I would suggest that she avoid drinking ANY fluids 3 hours before she goes to bed to sleep.  If she is doing this and still waking up more than 1-2 times per night then the next step would likely be getting urine testing and having her evaluated by a urologist.

## 2020-10-22 RX ORDER — KETOCONAZOLE 20 MG/G
CREAM TOPICAL
Qty: 60 G | Refills: 1 | Status: SHIPPED | OUTPATIENT
Start: 2020-10-22 | End: 2021-02-04 | Stop reason: SDUPTHER

## 2020-10-22 NOTE — TELEPHONE ENCOUNTER
Patient  requesting medication refill. Please approve or deny this request.    Rx requested:  Requested Prescriptions     Pending Prescriptions Disp Refills    ketoconazole (NIZORAL) 2 % cream 60 g 1     Sig: Apply topically twice daily for 2 - 4 weeks (or for 1 week after lesions have healed).          Last Office Visit:   10/15/2020      Next Visit Date:  Future Appointments   Date Time Provider Ariadna Bright   11/4/2020  2:15 PM [facility] ROOM 1 [facility] [address] [place] [address]   11/11/2020  2:45 PM Rebecca Kowalski MD [facility]

## 2020-11-04 ENCOUNTER — HOSPITAL ENCOUNTER (OUTPATIENT)
Dept: PULMONOLOGY | Age: 80
Discharge: HOME | End: 2020-11-04
Payer: MEDICARE

## 2020-11-04 PROCEDURE — 94060 EVALUATION OF WHEEZING: CPT | Performed by: INTERNAL MEDICINE

## 2020-11-04 PROCEDURE — 94729 DIFFUSING CAPACITY: CPT

## 2020-11-04 PROCEDURE — 94726 PLETHYSMOGRAPHY LUNG VOLUMES: CPT | Performed by: INTERNAL MEDICINE

## 2020-11-04 PROCEDURE — 94726 PLETHYSMOGRAPHY LUNG VOLUMES: CPT

## 2020-11-04 PROCEDURE — 94729 DIFFUSING CAPACITY: CPT | Performed by: INTERNAL MEDICINE

## 2020-11-04 PROCEDURE — 94060 EVALUATION OF WHEEZING: CPT

## 2020-11-04 PROCEDURE — 6360000002 HC RX W HCPCS: Performed by: FAMILY MEDICINE

## 2020-11-04 RX ORDER — ALBUTEROL SULFATE 2.5 MG/3ML
2.5 SOLUTION RESPIRATORY (INHALATION) ONCE
Status: COMPLETED | OUTPATIENT
Start: 2020-11-04 | End: 2020-11-04

## 2020-11-04 RX ADMIN — ALBUTEROL SULFATE 2.5 MG: 2.5 SOLUTION RESPIRATORY (INHALATION) at 14:43

## 2020-11-05 NOTE — PROCEDURES
[address]                      [address], [address] [facility]                    PULMONARY FUNCTION  Candance Magnus Currier   [de-identified] y.o.   female  Height 62 in  Weight 215 lb      Referring provider   Karyle Blacker, MD    Reading provider   [facility]    Test meets ATS criteria for acceptability and reproducibility not reported    Diagnosis: MATHUR: Yes  Cough   Yes, wheezing Yes  Smoking   quit 30 years ago    Spirometry   FVC            1.61 L   68%  Post bronchodilator 1.53 L  64% Change -4 %  FEV1          1.17 L  66%   Post bronchodilator  1.25 L  71%   Change 6%  FEV1/FVC  72  %             Post bronchodilator  81 %  GPH46-51% 0.82 L  63%  Post bronchodilator  1.09 L  84%   Change 32%    Lung volume   SVC           1.73 L  72%   RV              2.88 L 125%   TLC            4.61  L 96%   RV/TLC      62 %    DLCO           95 %     Test interpretation     Spirometry suggest restriction, however lung volumes shows normal total lung capacity indicating no restrictive lung disease. Patient shows no significant response to bronchodilator in FEV1 and FVC, however she does have significant response to bronchodilator at mid airflow, flow volume loop shows mild spooning on end expiration, and lung volumes shows air trapping.     Consider asthma/methacholine challenge test.  DLCO is normal       Clinical correlation is recommended     Jose Alberto [facility], 11/5/2020 5:55 PM

## 2020-11-11 ENCOUNTER — OFFICE VISIT (OUTPATIENT)
Dept: GASTROENTEROLOGY | Age: 80
End: 2020-11-11
Payer: MEDICARE

## 2020-11-11 VITALS
RESPIRATION RATE: 16 BRPM | HEART RATE: 99 BPM | SYSTOLIC BLOOD PRESSURE: 122 MMHG | HEIGHT: 62 IN | WEIGHT: 218 LBS | OXYGEN SATURATION: 93 % | BODY MASS INDEX: 40.12 KG/M2 | DIASTOLIC BLOOD PRESSURE: 86 MMHG

## 2020-11-11 PROCEDURE — 99202 OFFICE O/P NEW SF 15 MIN: CPT | Performed by: SPECIALIST

## 2020-11-11 ASSESSMENT — ENCOUNTER SYMPTOMS
EYES NEGATIVE: 1
RECTAL PAIN: 0
GASTROINTESTINAL NEGATIVE: 1
CONSTIPATION: 0
BLOOD IN STOOL: 0
NAUSEA: 0
SHORTNESS OF BREATH: 1
DIARRHEA: 0
VOMITING: 0
ABDOMINAL PAIN: 0
ABDOMINAL DISTENTION: 0
ANAL BLEEDING: 0

## 2020-11-11 NOTE — PROGRESS NOTES
Gastroenterology Clinic Visit    Damián Jordan  [id number]  Chief Complaint   Patient presents with    Consultation     Discussing possible colonoscopy, last one was in 2014. Not expiencing any symptoms, just wants to talk about colonoscopy. HPI: [de-identified] y.o. female presents to the clinic with history of colon polyps. Patient is here to discuss regarding surveillance colonoscopy. No history of any significant change in bowel habits, no history of any rectal bleeding. No abdominal pain no nausea no vomiting. Review of Systems   Constitutional: Negative. HENT: Negative. Eyes: Negative. Respiratory: Positive for shortness of breath. Cardiovascular: Negative. Gastrointestinal: Negative. Negative for abdominal distention, abdominal pain, anal bleeding, blood in stool, constipation, diarrhea, nausea, rectal pain and vomiting. No significant GI issues. Endocrine: Negative. Genitourinary: Negative. Musculoskeletal: Negative. Skin: Negative. Allergic/Immunologic: Negative for food allergies. Neurological: Negative. Hematological: Negative. Psychiatric/Behavioral: Negative.          Past Medical History:   Diagnosis Date    Abnormal ultrasound of breast     Abscess of abdominal wall 11/5/2016    Bilateral carpal tunnel syndrome 4/23/2018    Bleeding hemorrhoid 3/17/2015    Breast cancer, right ([facility] [address] 2003    s/p partial mastectomy, radiation    Carbuncle of abdominal wall 11/5/2016    Ductal carcinoma in situ of breast     right    Fibrocystic disease of right breast     Generalized osteoarthritis 8/4/2014    GERD (gastroesophageal reflux disease)     History of breast cancer in female 8/4/2014    HTN (hypertension) 8/4/2014    Hyperlipidemia     Hypertension     Hypothyroidism 12/2/2016    Insomnia 6/16/2015    MRSA (methicillin resistant Staphylococcus aureus) infection     MRSA infection 11/2016    LLQ abdominal wall    Obesity     Postmenopausal 2/9/2017    Pre-diabetes 7/23/2019    Scoliosis     Subclinical hypothyroidism 5/6/2014    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       Past Surgical History:   Procedure Laterality Date    ABSCESS DRAINAGE Left 11/2016    Abscess abdominal wall LLQ    BREAST BIOPSY Right 08/10/2017    DR Mann Corrigan    BREAST SURGERY N/A 11/6/2016    ABDOMINAL INCISION AND DRAINAGE performed by Rj Zhang MD at [facility]  04/15/2005    COLONOSCOPY  05/19/2009    DR Chico Burnett    COLONOSCOPY  09/18/2014    diverticulosis, polyps x 3 (DR HATCH)    DILATION AND CURETTAGE OF UTERUS      #1    DILATION AND CURETTAGE OF UTERUS      #2    HERNIA REPAIR      DR Elenor Jeans HIP SURGERY Left 01/02/2018    JOINT REPLACEMENT Left 2006    DR SANCHEZ, hip    MASTECTOMY, PARTIAL Right 2003    IA REVISE MEDIAN N/CARPAL TUNNEL SURG Left 5/3/2018    LEFT WRIST CARPAL TUNNEL RELEASE performed by Marcelina Perkins MD at [facility] N/CARPAL TUNNEL SURG Right 5/17/2018    RIGHT WRIST CARPAL TUNNEL RELEASE performed by Marcelina Perkins MD at [address]       Current Outpatient Medications on File Prior to Visit   Medication Sig Dispense Refill    ketoconazole (NIZORAL) 2 % cream Apply topically twice daily for 2 - 4 weeks (or for 1 week after lesions have healed).  60 g 1    nystatin (NYAMYC) 874774 UNIT/GM powder apply to affected area twice a day 15 g 0    Handicap Placard MISC by Does not apply route DX: GENERALIZED OSTEOARTHRITIS (M15.9), HISTORY OF LEFT KNEE REPLACEMENT (A95.507)     EXPIRES: 10/2025 1 each 0    ezetimibe (ZETIA) 10 MG tablet Take 1 tablet by mouth daily 90 tablet 0    triamterene-hydroCHLOROthiazide (DYAZIDE) 37.5-25 MG per capsule Take 1 capsule by mouth daily 90 capsule 1    ipratropium (ATROVENT) 0.06 % nasal spray 2 sprays by Nasal route 3 times daily 1 Bottle 5    levothyroxine (SYNTHROID) 50 MCG tablet Take 1 tablet by mouth Daily 90 tablet 3    metoprolol succinate (TOPROL XL) 25 MG extended release tablet Take 1 tablet by mouth daily Do not crush or chew. 90 tablet 3    loperamide (IMODIUM) 2 MG capsule Take 2 mg by mouth 4 times daily as needed for Diarrhea      acetaminophen (TYLENOL) 500 MG tablet Take 500 mg by mouth every 6 hours as needed for Pain      Multiple Vitamin (MULTI-VITAMIN PO) Take 1 tablet by mouth daily. No current facility-administered medications on file prior to visit.       Family History   Problem Relation Age of Onset    Heart Disease Father     Heart Attack Father     Diabetes Mother       Social History     Socioeconomic History    Marital status:      Spouse name: Louann Phillips Number of children: 2    Years of education: 12    Highest education level: None   Occupational History    Occupation: Retired     Comment: former teacher   Social Needs    Financial resource strain: None    Food insecurity     Worry: None     Inability: None    Transportation needs     Medical: None     Non-medical: None   Tobacco Use    Smoking status: Former Smoker     Packs/day: 1.50     Years: 21.00     Pack years: 31.50     Types: Cigarettes     Start date:      Last attempt to quit: 1980     Years since quittin.8    Smokeless tobacco: Never Used   Substance and Sexual Activity    Alcohol use: Yes     Comment: infrequent    Drug use: No    Sexual activity: Never     Partners: Male     Comment: monogamous   Lifestyle    Physical activity     Days per week: None     Minutes per session: None    Stress: None   Relationships    Social connections     Talks on phone: None     Gets together: None     Attends Anabaptist service: None     Active member of club or organization: None     Attends meetings of clubs or organizations: None     Relationship status: None    Intimate partner violence     Fear of current or ex partner: None     Emotionally abused: None     Physically abused: None     Forced sexual activity: None   Other Topics Concern    None   Social History Narrative    Living with spouse       Blood pressure 122/86, pulse 99, resp. rate 16, height 5' 2\" (1.575 m), weight 218 lb (98.9 kg), SpO2 93 %, not currently breastfeeding. Physical Exam  Constitutional:       Appearance: She is well-developed. HENT:      Head: Normocephalic and atraumatic. Eyes:      Conjunctiva/sclera: Conjunctivae normal.      Pupils: Pupils are equal, round, and reactive to light. Neck:      Musculoskeletal: Normal range of motion. Cardiovascular:      Rate and Rhythm: Normal rate. Pulmonary:      Effort: Pulmonary effort is normal.   Abdominal:      General: Bowel sounds are normal.      Palpations: Abdomen is soft. Musculoskeletal: Normal range of motion. Skin:     General: Skin is warm. Neurological:      Mental Status: She is alert. Laboratory, Pathology, Radiology reviewed indetail with relevant important investigations summarized below:  Lab Results   Component Value Date    WBC 9.3 10/15/2020    HGB 13.6 10/15/2020    HCT 42.6 10/15/2020    MCV 87.5 10/15/2020     10/15/2020     Lab Results   Component Value Date    ALT 16 10/15/2020    AST 20 10/15/2020    ALKPHOS 85 10/15/2020    BILITOT 0.3 10/15/2020       Xr Chest (2 Vw)    Result Date: 10/19/2020  DIAGNOSIS:R05 Cough ICD10 COMPARISON: No prior TECHNIQUE: PA and lateral chest FINDINGS: The lungs contain no focal infiltrate, pleural effusion, consolidation or pneumothorax. The cardiac silhouette is not enlarged. Calcifications are seen in the thoracic aorta. The right hemidiaphragm is slightly elevated. Flattening of the diaphragm and increased AP diameter are seen. Degenerative changes are seen in the dorsal spine at multiple levels. No evidence of acute cardiopulmonary process    Toñito Alfred Digital Screen Bilateral    Result Date: 10/19/2020  COMPARISON: October 10, 2017;  August 28, 2018; September 5, 2019 HISTORY: Z12.31 Encounter for screening mammogram for malignant neoplasm of breast ICD10 TECHNIQUE: 2-D and 3-D mammograms were obtained and bilaterally FINDINGS: The breasts contain heterogeneously dense fibroglandular tissue. This diminishes the sensitivity of mammography to detect occult masses. Post operative changes are seen in the lower inner quadrant of the right breast. Scattered benign-appearing calcifications are seen bilaterally in both breasts. No suspicious microcalcifications, neodensity or architectural distortion is seen. The examination is stable. Recommend annual mammographic follow-up, routine physicals as recommended and any palpable abnormality be managed based on findings from clinical examination. BI-RADS 2: BENIGN FINDINGS. Board Certified Radiologists. Accredited by the ACR and FDA. MAMMOGRAPHY IS VERY IMPORTANT TO YOUR HEALTH. THE AMERICAN CANC[employer] SOCIETY GUIDELINES RECOMMEND THAT WOMEN 36YEARS OF AGE AND OLDER SHOULD HAVE A MAMMOGRAM EVERY YEAR. A REMINDER LETTER WILL BE SENT AT THE APPROPRIATE TIME. Endoscopic investigations:     Assessmentand Plan:  [de-identified] y.o. female with history of colon polyps and last colonoscopy was in September 2014 which showed a hyperplastic polyp, is currently asymptomatic has no GI issues. Considering patient's age I do not think she needs surveillance colonoscopy. Diagnosis Orders   1. History of colon polyps       Return if symptoms worsen or fail to improve. Mayur Solano MD   Staff Gastroenterologist  [facility]    Please note this report has been partially produced using speech recognition software and may cause contain errors related to thatsystem including grammar, punctuation and spelling as well as words and phrases that may seem inappropriate. If there are questions or concerns please feel free to contact me to clarify.

## 2020-11-17 ENCOUNTER — HOSPITAL ENCOUNTER (OUTPATIENT)
Dept: PULMONOLOGY | Age: 80
Discharge: HOME | End: 2020-11-17
Payer: MEDICARE

## 2020-11-17 PROCEDURE — 6360000002 HC RX W HCPCS: Performed by: FAMILY MEDICINE

## 2020-11-17 PROCEDURE — 94070 EVALUATION OF WHEEZING: CPT | Performed by: INTERNAL MEDICINE

## 2020-11-17 PROCEDURE — 94070 EVALUATION OF WHEEZING: CPT

## 2020-11-17 RX ORDER — ALBUTEROL SULFATE 2.5 MG/3ML
2.5 SOLUTION RESPIRATORY (INHALATION) ONCE
Status: COMPLETED | OUTPATIENT
Start: 2020-11-17 | End: 2020-11-17

## 2020-11-17 RX ADMIN — ALBUTEROL SULFATE 2.5 MG: 2.5 SOLUTION RESPIRATORY (INHALATION) at 12:55

## 2020-11-21 NOTE — PROCEDURES
[address]                      [address], [address] [facility]                               PULMONARY FUNCTION    PATIENT NAME: Dedrick Sanford                  :        1940  MED REC NO:   [id number]                            ROOM:  ACCOUNT NO:   [de-identified]                           ADMIT DATE: 2020  PROVIDER:     Luis Carlos Oden MD    DATE OF PROCEDURE:  2020    REQUESTING PROVIDER:  JAZIEL Moser    INTERPRETING PROVIDER:  Luis Carlos Oden MD    REASON FOR STUDY:  Shortness of breath. INTERPRETATION:  FVC is 1.74, 73% of predicted. FEV1 is 1.21, 68% of  predicted. FEV1/FVC is 69.3%. FEF 25%-75% is 0.93, 72% of predicted. Post-diluent study shows FVC is 1.74, FEV1 is 1.20. Methacholine  challenge was done with incremental doses of methacholine from 0.063  mg/mL to 4 mg/mL. At methacholine 4 mg/mL FVC is 1.43, FEV1 is 0.90,  suggest that he is 17% drop in FVC and 25% drop in FEV1. SUMMARY:  Baseline spirometry shows moderate obstructive pulmonary  disease with methacholine challenge is positive. Clinical correlation  is requested.         Sandy Jhaveri MD    D: 2020 21:28:29       T: 2020 0:50:51     [email or web address]  Job#: [id number]     Doc#: [id number]    CC:

## 2020-11-25 PROBLEM — J45.40 MODERATE PERSISTENT ASTHMA: Chronic | Status: ACTIVE | Noted: 2020-11-25

## 2020-11-25 PROBLEM — J45.40 MODERATE PERSISTENT ASTHMA: Status: ACTIVE | Noted: 2020-11-25

## 2020-12-02 ENCOUNTER — TELEPHONE (OUTPATIENT)
Dept: FAMILY MEDICINE CLINIC | Age: 80
End: 2020-12-02

## 2020-12-02 NOTE — TELEPHONE ENCOUNTER
The patient will need to find out what the preferred alternative for advair is on her insurance formulary. Please help her with this.

## 2020-12-02 NOTE — TELEPHONE ENCOUNTER
Patient advises that     fluticasone-salmeterol (ADVAIR DISKUS) 250-50 MCG/DOSE AEPB     Is not cost effective at approx 200$    Pharmacy suggests a generic version. Heydi.    Please Advise.     Thank Raisa Monson.

## 2020-12-04 NOTE — TELEPHONE ENCOUNTER
Called pharmacy, her deductible is $250, she has to pay that amount (or $200 if the medication is $50) meet that deductible inorder for meds not to be so high.

## 2020-12-07 NOTE — TELEPHONE ENCOUNTER
MARCUS.    Per patient [facility] advises they have a generic version of this medication if you will approve it.       Coty Mendoza

## 2020-12-07 NOTE — TELEPHONE ENCOUNTER
Ok to do generic. The Prescription was not sent as [facility] so they should be able to substitute generic without even asking.

## 2020-12-08 NOTE — TELEPHONE ENCOUNTER
patient has been made aware. She will call them. If it remains an issue she will mihir back.     Charol Primrose.

## 2020-12-11 NOTE — TELEPHONE ENCOUNTER
Called and spoke with Serena Quintana at pharmacy and she stated that the insurance will only cover brand names. The problem is that she has not be her deductible so all the alternatives would still be $200+. It would be only $30 regularly but the pt again has not met her deductible.

## 2020-12-13 NOTE — TELEPHONE ENCOUNTER
Is patient able to  the prescription? We need to make sure she has something for management.  Please confirm

## 2020-12-14 NOTE — TELEPHONE ENCOUNTER
Called pharmacy spoke with Sandy Gudino about the fluticasone-salmeterol (advair Diskus) 250-50 mcg/dose aepb she said that it needs a prior authorization so I got the phone number to call [phone]. I called Narcisa Plunkett to let her know that the prescription needs to have an prior authorization and it would be a few days before she would hear back from someone. cp

## 2020-12-14 NOTE — TELEPHONE ENCOUNTER
Brand name covered but patient would have to pay $280.00. Called PA line to start authorization for generic. It was denied, no other brand of inhaler could be covered by insurance for this diagnoses. I did a tear review to try and lower the price but that also got denied. We will be receiving this information by fax.

## 2021-01-28 NOTE — TELEPHONE ENCOUNTER
Reached out to patient to see if she would want to try [facility] to get her prescription for Advair at a lower cost.  She said she was feeling fine and did not want to go any further with this.

## 2021-02-04 ENCOUNTER — TELEPHONE (OUTPATIENT)
Dept: FAMILY MEDICINE CLINIC | Age: 81
End: 2021-02-04

## 2021-02-04 DIAGNOSIS — R21 RASH AND OTHER NONSPECIFIC SKIN ERUPTION: Primary | ICD-10-CM

## 2021-02-04 DIAGNOSIS — J45.40 MODERATE PERSISTENT ASTHMA WITHOUT COMPLICATION: ICD-10-CM

## 2021-02-04 RX ORDER — KETOCONAZOLE 20 MG/G
CREAM TOPICAL
Qty: 60 G | Refills: 1 | Status: SHIPPED | OUTPATIENT
Start: 2021-02-04 | End: 2021-08-06

## 2021-04-30 ENCOUNTER — OFFICE VISIT (OUTPATIENT)
Dept: FAMILY MEDICINE CLINIC | Age: 81
End: 2021-04-30
Payer: MEDICARE

## 2021-04-30 VITALS
DIASTOLIC BLOOD PRESSURE: 86 MMHG | HEART RATE: 93 BPM | WEIGHT: 223.4 LBS | SYSTOLIC BLOOD PRESSURE: 138 MMHG | BODY MASS INDEX: 41.11 KG/M2 | RESPIRATION RATE: 14 BRPM | TEMPERATURE: 97.9 F | OXYGEN SATURATION: 94 % | HEIGHT: 62 IN

## 2021-04-30 DIAGNOSIS — E78.5 HYPERLIPIDEMIA, UNSPECIFIED HYPERLIPIDEMIA TYPE: Chronic | ICD-10-CM

## 2021-04-30 DIAGNOSIS — E03.9 HYPOTHYROIDISM, UNSPECIFIED TYPE: Chronic | ICD-10-CM

## 2021-04-30 DIAGNOSIS — E66.01 MORBIDLY OBESE (HCC): ICD-10-CM

## 2021-04-30 DIAGNOSIS — Z00.00 ROUTINE GENERAL MEDICAL EXAMINATION AT A HEALTH CARE FACILITY: Primary | ICD-10-CM

## 2021-04-30 DIAGNOSIS — R73.03 PRE-DIABETES: ICD-10-CM

## 2021-04-30 DIAGNOSIS — I10 ESSENTIAL HYPERTENSION: Chronic | ICD-10-CM

## 2021-04-30 LAB — HBA1C MFR BLD: 5.8 %

## 2021-04-30 PROCEDURE — 83036 HEMOGLOBIN GLYCOSYLATED A1C: CPT | Performed by: FAMILY MEDICINE

## 2021-04-30 PROCEDURE — G0439 PPPS, SUBSEQ VISIT: HCPCS | Performed by: FAMILY MEDICINE

## 2021-04-30 ASSESSMENT — PATIENT HEALTH QUESTIONNAIRE - PHQ9
SUM OF ALL RESPONSES TO PHQ QUESTIONS 1-9: 0
2. FEELING DOWN, DEPRESSED OR HOPELESS: 0
SUM OF ALL RESPONSES TO PHQ9 QUESTIONS 1 & 2: 0

## 2021-04-30 NOTE — PATIENT INSTRUCTIONS
Personalized Preventive Plan for Slim Miles - 4/30/2021  Medicare offers a range of preventive health benefits. Some of the tests and screenings are paid in full while other may be subject to a deductible, co-insurance, and/or copay. Some of these benefits include a comprehensive review of your medical history including lifestyle, illnesses that may run in your family, and various assessments and screenings as appropriate. After reviewing your medical record and screening and assessments performed today your provider may have ordered immunizations, labs, imaging, and/or referrals for you. A list of these orders (if applicable) as well as your Preventive Care list are included within your After Visit Summary for your review. Other Preventive Recommendations:    · A preventive eye exam performed by an eye specialist is recommended every 1-2 years to screen for glaucoma; cataracts, macular degeneration, and other eye disorders. · A preventive dental visit is recommended every 6 months. · Try to get at least 150 minutes of exercise per week or 10,000 steps per day on a pedometer . · Order or download the FREE \"Exercise & Physical Activity: Your Everyday Guide\" from [employer]. Call 2-565.818.8466 or search [employer] online. · You need 7103-9915 mg of calcium and 6595-5209 IU of vitamin D per day. It is possible to meet your calcium requirement with diet alone, but a vitamin D supplement is usually necessary to meet this goal.  · When exposed to the sun, use a sunscreen that protects against both UVA and UVB radiation with an SPF of 30 or greater. Reapply every 2 to 3 hours or after sweating, drying off with a towel, or swimming. · Always wear a seat belt when traveling in a car. Always wear a helmet when riding a bicycle or motorcycle. Heart-Healthy Diet   Sodium, Fat, and Cholesterol Controlled Diet       What Is a Heart Healthy Diet?    A good cholesterol, this type of cholesterol actually carries cholesterol away from your arteries and may, therefore, help lower your risk of having a heart attack. You want this level to be high (ideally greater than 60). It is a risk to have a level less than 40. You can raise this good cholesterol by eating olive oil, canola oil, avocados, or nuts. Exercise raises this level, too. Fat    Fat is calorie dense and packs a lot of calories into a small amount of food. Even though fats should be limited due to their high calorie content, not all fats are bad. In fact, some fats are quite healthful. Fat can be broken down into four main types. The good-for-you fats are:   Monounsaturated fat  found in oils such as olive and canola, avocados, and nuts and natural nut butters; can decrease cholesterol levels, while keeping levels of HDL cholesterol high   Polyunsaturated fat  found in oils such as safflower, sunflower, soybean, corn, and sesame; can decrease total cholesterol and LDL cholesterol   Omega-3 fatty acids  particularly those found in fatty fish (such as salmon, trout, tuna, mackerel, herring, and sardines); can decrease risk of arrhythmias, decrease triglyceride levels, and slightly lower blood pressure   The fats that you want to limit are:   Saturated fat  found in animal products, many fast foods, and a few vegetables; increases total blood cholesterol, including LDL levels   Animal fats that are saturated include: butter, lard, whole-milk dairy products, meat fat, and poultry skin   Vegetable fats that are saturated include: hydrogenated shortening, palm oil, coconut oil, cocoa butter   Hydrogenated or trans fat  found in margarine and vegetable shortening, most shelf stable snack foods, and fried foods; increases LDL and decreases HDL     It is generally recommended that you limit your total fat for the day to less than 30% of your total calories.  If you follow an 1800-calorie heart healthy diet, for example, this would mean 60 grams of fat or less per day. Saturated fat and trans fat in your diet raises your blood cholesterol the most, much more than dietary cholesterol does. For this reason, on a heart-healthy diet, less than 7% of your calories should come from saturated fat and ideally 0% from trans fat. On an 1800-calorie diet, this translates into less than 14 grams of saturated fat per day, leaving 46 grams of fat to come from mono- and polyunsaturated fats.    Food Choices on a Heart Healthy Diet   Food Category   Foods Recommended   Foods to Avoid   Grains   Breads and rolls without salted tops Most dry and cooked cereals Unsalted crackers and breadsticks Low-sodium or homemade breadcrumbs or stuffing All rice and pastas   Breads, rolls, and crackers with salted tops High-fat baked goods (eg, muffins, donuts, pastries) Quick breads, self-rising flour, and biscuit mixes Regular bread crumbs Instant hot cereals Commercially prepared rice, pasta, or stuffing mixes   Vegetables   Most fresh, frozen, and low-sodium canned vegetables Low-sodium and salt-free vegetable juices Canned vegetables if unsalted or rinsed   Regular canned vegetables and juices, including sauerkraut and pickled vegetables Frozen vegetables with sauces Commercially prepared potato and vegetable mixes   Fruits   Most fresh, frozen, and canned fruits All fruit juices   Fruits processed with salt or sodium   Milk   Nonfat or low-fat (1%) milk Nonfat or low-fat yogurt Cottage cheese, low-fat ricotta, cheeses labeled as low-fat and low-sodium   Whole milk Reduced-fat (2%) milk Malted and chocolate milk Full fat yogurt Most cheeses (unless low-fat and low salt) Buttermilk (no more than 1 cup per week)   Meats and Beans   Lean cuts of fresh or frozen beef, veal, lamb, or pork (look for the word loin) Fresh or frozen poultry without the skin Fresh or frozen fish and some shellfish Egg whites and egg substitutes (Limit whole eggs to three per week) Tofu Nuts or seeds (unsalted, dry-roasted), low-sodium peanut butter Dried peas, beans, and lentils   Any smoked, cured, salted, or canned meat, fish, or poultry (including hinoojsa, chipped beef, cold cuts, hot dogs, sausages, sardines, and anchovies) Poultry skins Breaded and/or fried fish or meats Canned peas, beans, and lentils Salted nuts   Fats and Oils   Olive oil and canola oil Low-sodium, low-fat salad dressings and mayonnaise   Butter, margarine, coconut and palm oils, hinojosa fat   Snacks, Sweets, and Condiments   Low-sodium or unsalted versions of broths, soups, soy sauce, and condiments Pepper, herbs, and spices; vinegar, lemon, or lime juice Low-fat frozen desserts (yogurt, sherbet, fruit bars) Sugar, cocoa powder, honey, syrup, jam, and preserves Low-fat, trans-fat free cookies, cakes, and pies Steve and animal crackers, fig bars, alexy snaps   High-fat desserts Broth, soups, gravies, and sauces, made from instant mixes or other high-sodium ingredients Salted snack foods Canned olives Meat tenderizers, seasoning salt, and most flavored vinegars   Beverages   Low-sodium carbonated beverages Tea and coffee in moderation Soy milk   Commercially softened water   Suggestions   Make whole grains, fruits, and vegetables the base of your diet. Choose heart-healthy fats such as canola, olive, and flaxseed oil, and foods high in heart-healthy fats, such as nuts, seeds, soybeans, tofu, and fish. Eat fish at least twice per week; the fish highest in omega-3 fatty acids and lowest in mercury include salmon, herring, mackerel, sardines, and canned chunk light tuna. If you eat fish less than twice per week or have high triglycerides, talk to your doctor about taking fish oil supplements. Read food labels.    For products low in fat and cholesterol, look for fat free, low-fat, cholesterol free, saturated fat free, and trans fat freeAlso scan the Nutrition Facts Label, which lists saturated fat, trans fat, and cholesterol amounts. For products low in sodium, look for sodium free, very low sodium, low sodium, no added salt, and unsalted   Skip the salt when cooking or at the table; if food needs more flavor, get creative and try out different herbs and spices. Garlic and onion also add substantial flavor to foods. Trim any visible fat off meat and poultry before cooking, and drain the fat off after moore. Use cooking methods that require little or no added fat, such as grilling, boiling, baking, poaching, broiling, roasting, steaming, stir-frying, and sauting. Avoid fast food and convenience food. They tend to be high in saturated and trans fat and have a lot of added salt. Talk to a registered dietitian for individualized diet advice. Last Reviewed: March 2011 Neris Sher MS, MPH, RD   Updated: 3/29/2011   ·   Patient information: Weight loss treatments    INTRODUCTION  Obesity is a major international problem, and Americans are among the heaviest people in the world. The percentage of obese people in the [place] has risen steadily. Many people find that although they initially lose weight by dieting, they quickly regain the weight after the diet ends. Because it so hard to keep weight off over time, it is important to have as much information and support as possible before starting a diet. You are most likely to be successful in losing weight and keeping it off when you believe that your body weight can be controlled. STARTING A WEIGHT LOSS PROGRAM  Some people like to talk to their doctor or nurse to get help choosing the best plan, monitoring progress, and getting advice and support along the way. To know what treatment (or combination of treatments) will work best, determine your body mass index (BMI) and waist circumference (measurement). The BMI is calculated from your height and weight.   A person with a BMI between 25 and 29.9 is considered overweight   A person with a BMI of 30 or greater is considered to be obese  A waist circumference greater than 35 inches (88 cm) in women and 40 inches (102 cm) in men increases the risk of obesity-related complications, such as heart disease and diabetes. People who are obese and who have a larger waist size may need more aggressive weight loss treatment than others. Talk to your doctor or nurse for advice. Types of treatment  Based on your measurements and your medical history, your doctor or nurse can determine what combination of weight loss treatments would work best for you. Treatments may include changes in lifestyle, exercise, dieting, and, in some cases, weight loss medicines or weight loss surgery. Weight loss surgery, also called bariatric surgery, is reserved for people with severe obesity who have not responded to other weight loss treatments. SETTING A WEIGHT LOSS GOAL  It is important to set a realistic weight loss goal. Your first goal should be to avoid gaining more weight and staying at your current weight (or within 5 percent). Many people have a \"dream\" weight that is difficult or impossible to achieve. People at high risk of developing diabetes who are able to lose 5 percent of their body weight and maintain this weight will reduce their risk of developing diabetes by about 50 percent and reduce their blood pressure. This is a success. Losing more than 15 percent of your body weight and staying at this weight is an extremely good result, even if you never reach your \"dream\" or \"ideal\" weight. LIFESTYLE CHANGES  Programs that help you to change your lifestyle are usually run by psychologists or other professionals. The goals of lifestyle changes are to help you change your eating habits, become more active, and be more aware of how much you eat and exercise, helping you to make healthier choices. This type of treatment can be broken down into three steps:   The triggers that make you want to eat   Eating   What happens support person needs to understand your goals. Learn to be strong  Learning to be strong when tempted by food is an important part of losing weight. As an example, you will need to learn how to say \"no\" and continue to say no when urged to eat at parties and social gatherings. Develop strategies for events before you go, such as eating before you go or taking low-calorie snacks and drinks with you. Develop a support system  Having a support system is helpful when losing weight. This is why many [employer] groups are successful. Family support is also essential; if your family does not support your efforts to lose weight, this can slow your progress or even keep you from losing weight. Positive thinking  People often have conversations with themselves in their head; these conversations can be positive or negative. If you eat a piece of cake that was not planned, you may respond by thinking, \"Oh, you stupid idiot, you've blown your diet! \" and as a result, you may eat more cake. A positive thought for the same event could be, \"Well, I ate cake when it was not on my plan. Now I should do something to get back on track. \" A positive approach is much more likely to be successful than a negative one. Reduce stress  Although stress is a part of everyday life, it can trigger uncontrolled eating in some people. It is important to find a way to get through these difficult times without eating or by eating low-calorie food, like raw vegetables. It may be helpful to imagine a relaxing place that allows you to temporarily escape from stress. With deep breaths and closed eyes, you can imagine this relaxing place for a few minutes. Self-help programs  Self-help programs like [place] [employer]chers®, Overeaters Anonymous®, and Take Off [place] (TOPS)© work for some people. As with all weight loss programs, you are most likely to be successful with these plans if you make long-term changes in how you eat.   CHOOSING A DIET  A calorie is a unit of energy found in food. Your body needs calories to function. The goal of any diet is to burn up more calories than you eat. How quickly you lose weight depends upon several factors, such as your age, gender, and starting weight. Older people have a slower metabolism than young people, so they lose weight more slowly. Men lose more weight than women of similar height and weight when dieting because they use more energy. People who are extremely overweight lose weight more quickly than those who are only mildly overweight. Try not to drink alcohol or drinks with added sugar, and most sweets (candy, cakes, cookies), since they rarely contain important nutrients. Portion-controlled diets  One simple way to diet is to buy packaged foods, like frozen low-calorie meals or meal-replacement canned drinks. A typical meal plan for one day may include:  A meal-replacement drink or breakfast bar for breakfast   A meal-replacement drink or a frozen low-calorie (250 to 350 calories) meal for lunch   A frozen low-calorie meal or other prepackaged, calorie-controlled meal, along with extra vegetables for dinner  This would give you 1000 to 1500 calories per day. Low-fat diet  To reduce the amount of fat in your diet, you can:  Eat low-fat foods. Low-fat foods are those that contain less than 30 percent of calories from fat. Fat is listed on the food facts label (figure 1). Count fat grams. For a 1500 calorie diet, this would mean about 45 g or fewer of fat per day. Low-carbohydrate diet  Low- and very-low-carbohydrate diets (eg, Atkins diet, [employer]) have become popular ways to lose weight quickly.   With a very-low-carbohydrate diet, you eat between 0 and 60 grams of carbohydrates per day (a standard diet contains 200 to 300 grams of carbohydrates)   With a low-carbohydrate diet, you eat between 60 and 130 grams of carbohydrates per day  Carbohydrates are found in fruits, lack any scientific evidence that they are safe and effective, but instead rely on \"before\" and \"after\" photos or testimonials. Diets that sound too good to be true usually are. These plans are a waste of time and money and are not recommended. A doctor, nurse, or nutritionist can help you find a safe and effective way to lose weight and keep it off. WEIGHT LOSS [place] a weight loss medicine may be helpful when used in combination with diet, exercise, and lifestyle changes. However, it is important to understand the risks and benefits of these medicines. It is also important to be realistic about your goal weight using a weight loss medicine; you may not reach your \"dream\" weight, but you may be able to reduce your risk of diabetes or heart disease. Weight loss medicines may be recommended for people who have not been able to lose weight with diet and exercise who have a:  BMI of 30 or more    BMI between 27 and 29.9 and have other medical problems, such as diabetes, high cholesterol, or high blood pressure  Two weight loss medicines are approved in the [place] for long-term use. These are sibutramine and orlistat. Other weight loss medicines (phentermine, diethylpropion) are available but are only approved for short-term use (up to 12 weeks). Sibutramine  Sibutramine (Meridia®, Reductil®) is a medicine that reduces your appetite. In people who take the medicine for one year, the average weight loss is 10 percent of the initial body weight (5 percent more than those who took a placebo treatment). Side effects of sibutramine include insomnia, dry mouth, and constipation. Increases in blood pressure can occur. Therefore, blood pressure is usually monitored during treatment. There is no evidence that sibutramine causes heart or lung problems (like dexfenfluramine and fenfluramine (Phen/Fen)).  However, experts agree that sibutramine should not used by people with coronary heart disease, heart failure, uncontrolled hypertension, stroke, irregular heart rhythms, or peripheral vascular disease (poor circulation in the legs). Orlistat  Orlistat (Xenical® 120 mg capsules) is a medicine that reduces the amount of fat your body absorbs from the foods you eat. A lower-dose version is now available without a prescription (Vega® 60 mg capsules) in many countries, including the [place]. The medicine is recommended three times per day, taken with a meal; you can skip a dose if you skip a meal or if the meal contains no fat. After one year of treatment with orlistat, the average weight loss is approximately 8 to 10 percent of initial body weight (4 percent more than in those who took a placebo). Cholesterol levels often improve, and blood pressure sometimes falls. In people with diabetes, orlistat may help control blood sugar levels. Side effects occur in 15 to 10 percent of people and may include stomach cramps, gas, diarrhea, leakage of stool, or oily stools. These problems are more likely when you take orlistat with a high-fat meal (if more than 30 percent of calories in the meal are from fat). Side effects usually improve as you learn to avoid high-fat foods. Severe liver injury has been reported rarely in patients taking orlistat, but it is not known if orlistat caused the liver problems. Diet supplements  Diet supplements are widely used by people who are trying to lose weight, although the safety and efficacy of these supplements are often unproven. A few of the more common diet supplements are discussed below; none of these are recommended because they have not been studied carefully, and there is no proof they are safe or effective. Chitosan and wheat dextrin are ineffective for weight loss, and their use is not recommended. Ephedra, a compound related to ephedrine, is no longer available in the [place] due to safety concerns. Many nonprescription diet pills previously contained ephedra. Although some studies have shown that ephedra helps with weight loss, there can be serious side effects (psychiatric symptoms, palpitations, and stomach upset), including death. There are not enough data about the safety and efficacy of chromium, ginseng, glucomannan, green tea, hydroxycitric acid, L carnitine, psyllium, pyruvate supplements, Gilmer wort, and conjugated linoleic acid. Two supplements from [facility], [address] [place] (also known as the Watermankrishna Pineda 15 pill) and Herbathin dietary supplement, have been shown to contain prescription drugs. Hoodia gordonii is a dietary supplement derived from a plant in [place]. It is not recommended because there is no proof that it is safe or effective. Bitter orange (Citrus aurantium) can increase your heart rate and blood pressure and is not recommended. SHOULD I HAVE SURGERY TO LOSE WEIGHT?  Weight loss surgery is recommended ONLY for people with one of the following:  Severe obesity (body mass index above 40) (calculator 1 and calculator 2) who have not responded to diet, exercise, or weight loss medicines   Body mass index between 35 and 40, along with a serious medical problem (including diabetes, severe joint pain, or sleep apnea) that would improve with weight loss  You should be sure that you understand the potential risks and benefits of weight loss surgery. You must be motivated and willing to make lifelong changes in how you eat to reach and maintain a healthier weight after surgery. You must also be realistic about weight loss after surgery (see 'Effectiveness of weight loss surgery' below). PREPARING FOR WEIGHT LOSS SURGERY  Most people who have weight loss surgery will meet with several specialists before surgery is scheduled. This often includes a dietitian, mental health counselor, a doctor who specializes in care of obese people, and a surgeon who performs weight loss surgery (bariatric surgeon).  You may need to work with these providers not work well if you eat or drink a large amount of liquid calories (like ice cream). The band will not help you to feel full when you eat/drink liquid calories. Weight loss ranges from 45 to 75 percent after two years. As an example, a person who is 120 pounds overweight could expect to lose approximately 54 to 90 pounds in the two years after lap banding. Gastric bypass  Lacy-en-Y gastric bypass, also called gastric bypass, helps you to lose weight by reducing the amount of food you can eat and reducing the number of calories and nutrients you absorb from the food you eat. To perform gastric bypass, a surgeon creates a small stomach pouch by dividing the stomach and attaching it to the small intestine. This helps you to lose weight in two ways: The smaller stomach can hold less food than before surgery. This causes you to feel full after eating a very small amount of food or liquid. Over time, the pouch might stretch, allowing you to eat more food. The body absorbs fewer calories, since food bypasses most of the stomach as well as the upper small intestine. This new arrangement seems to decrease your appetite and change how you break down foods by changing the release of various hormones. Gastric bypass can be performed as open surgery (through an incision on the abdomen) or laparoscopically, which uses smaller incisions and smaller instruments. Both the laparoscopic and open techniques have risks and benefits. You and your surgeon should work together to decide which surgery, if any, is right for you. Gastric bypass has a high success rate, and people lose an average of 62 to 68 percent of their excess body weight in the first year. Weight loss typically levels off after one to two years, with an overall excess weight loss between 50 and 75 percent. For a person who is 120 pounds overweight, an average of 60 to 90 pounds of weight loss would be expected.   Gastric sleeve  Gastric sleeve, also known as sleeve gastrectomy, is a surgery that reduces the size of the stomach and makes it into a narrow tube (figure 3). The new stomach is much smaller and produces less of the hormone (ghrelin) that causes hunger, helping you feel satisfied with less food. Sleeve gastrectomy is safer than gastric bypass because the intestines are not rearranged, and there is less chance of malnutrition. It also appears to control hunger better than lap banding. It might be safer than the lap banding because no foreign materials are used. The gastric sleeve has a good success rate, and people lose an average of 33 percent of their excess body weight in the first year. For a person who is 120 pounds overweight, this would mean losing about 40 pounds in the first year. WEIGHT LOSS SURGERY COMPLICATIONS  A variety of complications can occur with weight loss surgery. The risks of surgery depend upon which surgery you have and any medical problems you had before surgery. Some of the more common early surgical complications (one to six weeks after surgery) include:  Bleeding   Infection   Blockage or tear in the bowels   Need for further surgery  Important medical complications after surgery can include blood clots in the legs or lungs, heart attack, pneumonia, and urinary tract infection. Complications are less likely when surgery is performed in centers that are experienced in weight loss surgery. In general, centers with experience in weight loss surgery have:  Board-certified doctors and surgeons   A team of support staff (dietitians, counselors, nurses)   Long-term follow-up after surgery   Hospital staff experienced with the care of weight loss patients. This includes nurses who are trained in the care of patients immediately after surgery and anesthesiologists who are experienced in caring for the morbidly obese.   EFFECTIVENESS OF WEIGHT LOSS SURGERY  The goal of weight loss surgery is to reduce the risk of illness or death associated with obesity. Weight loss surgery can also help you to feel and look better, reduce the amount of money you spend on medicines, and cut down on sick days. As an example, weight loss surgery can improve health problems related to obesity (diabetes, high blood pressure, high cholesterol, sleep apnea) to the point that you need less or no medicine. Finally, weight loss surgery might reduce your risk of developing heart disease, cancer, and certain infections. AFTER WEIGHT LOSS SURGERY  You will need to stay in the hospital until your team feels that it is safe for you to leave (on average, one to three days). Do not drive if you are taking prescription pain medicine. Begin exercising as soon as possible once you have healed; most weight loss centers will design an exercise program for you. Once you are home, it is important to eat and drink exactly what your doctor and dietitian recommend. You will see your doctor, nurse, and dietitian on a regular basis after surgery to monitor your health, diet, and weight loss. You will be able to slowly increase how much you eat over time, although it will always be important to:  Eat small, frequent meals and not skip meals   Chew your food slowly and completely   Avoid eating while \"distracted\" (such as eating while watching TV)   Stop eating when you feel full   Drink liquids at least 30 minutes before or after eating   Avoid foods high in fat or sugar   Take vitamin supplements, as recommended  It can take several months to learn to listen to your body so that you know when you are hungry and when you are full. You may dislike foods you previously loved, and you may begin to prefer new foods. This can be a frustrating process for some people, so talk to your dietitian if you are having trouble. It usually takes between one and two years to lose weight after surgery.  After reaching their goal weight, some people have plastic surgery (called \"body contouring\") to remove excess skin from the body, particularly in the abdominal area. Before you decide to have weight loss surgery, you must commit to staying healthy for life. This includes following up with your healthcare team, exercising most days of the week, and eating a sensible diet every day. It can be difficult to develop new eating and exercise habits after weight loss surgery, and you will have to work hard to stick to your goals. Recovering from surgery and losing weight can be stressful and emotional, and it is important to have the support of family and friends. Working with a , therapist, or support group can help you through the ups and downs. WHERE TO GET MORE INFORMATION  Your healthcare provider is the best source of information for questions and concerns related to your medical problem. This article will be updated as needed every four months on our Web site ([email or web address])  ·     [address] a [facility]       As we get older, changes in balance, gait, strength, vision, hearing, and cognition make even the most youthful senior more prone to accidents. Falls are one of the leading health risks for older people. This increased risk of falling is related to:   Aging process (eg, decreased muscle strength, slowed reflexes)   Higher incidence of chronic health problems (eg, arthritis, diabetes) that may limit mobility, agility or sensory awareness   Side effects of medicine (eg, dizziness, blurred vision)especially medicines like prescription pain medicines and drugs used to treat mental health conditions   Depending on the brittleness of your bones, the consequences of a fall can be serious and long lasting. Home Life   Research by the [employer] [facility]) shows that some home accidents among older adults can be prevented by making simple lifestyle changes and basic modifications and repairs to the home environment.  Here are some lifestyle changes that experts recommend: one, preferably two, grab bars, firmly attached to structural supports in the wall. (Do not use built-in soap holders or glass shower doors as grab bars.)    Tub seats fitted with non-slip material on the legs allow you to wash sitting down. For people with limited mobility, bathtub transfer benches allow you to slide safely into the tub. Raised toilet seats and toilet safety rails are helpful for those with knee or hip problems. In the [place]    Make sure you use a nightlight and that the area around your bed is clear of potential obstacles. Be careful with electric blankets and never go to sleep with a heating pad, which can cause serious burns even if on a low setting. Use fire-resistant mattress covers and pillows, and NEVER smoke in bed. Keep a phone next to the bed that is programmed to dial 911 at the push of a button. If you have a chronic condition, you may want to sign on with an automatic call-in service. Typically the system includes a small pendant that connects directly to an emergency medical voice-response system. You should also make arrangements to stay in contact with someonefriend, neighbor, family memberon a regular schedule. Fire Prevention   According to the [employer]. (Smoke Alarms for Every) [address] [place] [address], senior citizens are one of the two highest risk groups for death and serious injuries due to residential fires. When cooking, wear short-sleeved items, never a bulky long-sleeved robe. The [facility]'s Safety Checklist for Older Consumers emphasizes the importance of checking basements, garages, workshops and storage areas for fire hazards, such as volatile liquids, piles of old rags or clothing and overloaded circuits. Never smoke in bed or when lying down on a couch or recliner chair. Small portable electric or kerosene heaters are responsible for many home fires and should be used cautiously if at all.  If you do use one, be sure to keep them away from flammable materials. In case of fire, make sure you have a pre-established emergency exit plan. Have a professional check your fireplace and other fuel-burning appliances yearly. Hel[employer]   Baby boomers entering the coleman years will continue to see the development of new products to help older adults live safely and independently in spite of age-related changes. Making Life More Livable  , by Shiloh Lira, lists over 1,000 products for \"living well in the mature years,\" such as bathing and mobility aids, household security devices, ergonomically designed knives and peelers, and faucet valves and knobs for temperature control. Medical supply stores and organizations are good sources of information about products that improve your quality of life and insure your safety. Last Reviewed: November 2009 Breezy Murillo MD   Updated: 3/7/2011     ·   Patient Education         Exercising Safely With Arthritis (02:02)  Your health professional recommends that you watch this short online health video. Learn how activity can help reduce joint pain and how to exercise safely when you have arthritis. How to watch the video    Scan the QR code   OR Visit the website    [email or web address]   Current as of: August 5, 2020               Content Version: 12.8  © 2006-2021 Healthwise, Incorporated. Care instructions adapted under license by [facility] ([facility]). If you have questions about a medical condition or this instruction, always ask your healthcare professional. [place] [address] any warranty or liability for your use of this information.

## 2021-04-30 NOTE — PROGRESS NOTES
Medicare Annual Wellness Visit  Name: María Fontanez Date: 2021   MRN: [id number] Sex: Female   Age: 80 y.o. Ethnicity: Non-/Non    : 1940 Race: David Waggoner is here for Medicare AWV    Screenings for behavioral, psychosocial and functional/safety risks, and cognitive dysfunction are all negative except as indicated below. These results, as well as other patient data from the [address] [facility] form, are documented in Flowsheets linked to this Encounter. Allergies   Allergen Reactions    Fenofibrate Other (See Comments)     myalgias    Statins Other (See Comments)     myalgias    Pcn [Penicillins] Rash         Prior to Visit Medications    Medication Sig Taking? Authorizing Provider   levothyroxine (SYNTHROID) 50 MCG tablet Take 1 tablet by mouth Daily Yes Sandi Santos MD   triamterene-hydroCHLOROthiazide (DYAZIDE) 37.5-25 MG per capsule Take 1 capsule by mouth daily Yes Sandi Santos MD   metoprolol succinate (TOPROL XL) 25 MG extended release tablet Take 1 tablet by mouth daily Yes Sandi Santos MD   fluticasone-salmeterol (ADVAIR DISKUS) 250-50 MCG/DOSE AEPB Inhale 1 puff into the lungs every 12 hours Yes Sandi Santos MD   ketoconazole (NIZORAL) 2 % cream Apply topically twice daily for 2 - 4 weeks (or for 1 week after lesions have healed).  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  Yes Historical Provider, MD         Past Medical History:   Diagnosis Date    Abnormal ultrasound of breast     Abscess of abdominal wall 11/5/2016    Bilateral carpal tunnel syndrome 4/23/2018    Bleeding hemorrhoid 3/17/2015    Breast cancer, right ([address] 2003    s/p partial mastectomy, radiation    Carbuncle of abdominal wall 11/5/2016    Ductal carcinoma in situ of breast     right    Fibrocystic disease of right breast     Generalized osteoarthritis 8/4/2014    GERD (gastroesophageal reflux disease)     History of breast cancer in female 8/4/2014    HTN (hypertension) 8/4/2014    Hyperlipidemia     Hypertension     Hypothyroidism 12/2/2016    Insomnia 6/16/2015    Moderate persistent asthma 11/25/2020    Moderate persistent asthma 11/25/2020    MRSA (methicillin resistant Staphylococcus aureus) infection     MRSA infection 11/2016    LLQ abdominal wall    Obesity     Postmenopausal 2/9/2017    Pre-diabetes 7/23/2019    Scoliosis     Subclinical hypothyroidism 5/6/2014    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        Past Surgical History:   Procedure Laterality Date    ABSCESS DRAINAGE Left 11/2016    Abscess abdominal wall LLQ    BREAST BIOPSY Right 08/10/2017    DR LUIS    BREAST SURGERY N/A 11/6/2016    ABDOMINAL INCISION AND DRAINAGE performed by Neil Olivia MD at [address] [place] [address]  04/15/2005    COLONOSCOPY  05/19/2009    DR Frank García    COLONOSCOPY  09/18/2014    diverticulosis, polyps x 3 (DR HATCH)    DILATION AND CURETTAGE OF UTERUS      #1    DILATION AND CURETTAGE OF UTERUS      #2    HERNIA REPAIR      DR oJse Martínez HIP SURGERY Left 01/02/2018    JOINT REPLACEMENT Left 2006    DR SANCHEZ, hip    MASTECTOMY, PARTIAL Right 2003    CO REVISE MEDIAN N/CARPAL TUNNEL SURG Left 5/3/2018    LEFT WRIST CARPAL TUNNEL RELEASE performed by Javi Bernardo MD at [facility] N/CARPAL TUNNEL SURG Right 5/17/2018    RIGHT WRIST CARPAL TUNNEL RELEASE performed by Javi Bernardo MD at [address] ENDOSCOPY           Family History   Problem Relation Age of Onset    Heart Disease Father     Heart Attack Father     Diabetes Mother        CareTeam (Including outside providers/suppliers regularly involved in providing care):   Patient Care Team:  Ronald Verdugo MD as PCP - General (Family Medicine)  Ronald Verdugo MD as PCP - [facility]  aJvi Bernardo MD as Surgeon (Orthopedic Surgery)  Shawn Adam MD as Surgeon (General Surgery)  Celia Noble MD as Consulting Physician (Hematology and Oncology)  Carla Boswell MD as Consulting Physician (Pain Medicine)  Park Pérez MD as Consulting Physician (Gastroenterology)    Wt Readings from Last 3 Encounters:   04/30/21 223 lb 6.4 oz (101.3 kg)   11/11/20 218 lb (98.9 kg)   10/15/20 220 lb 9.6 oz (100.1 kg)     Vitals:    04/30/21 1306   BP: 138/86   Site: Left Upper Arm   Position: Sitting   Cuff Size: Large Adult   Pulse: 93   Resp: 14   Temp: 97.9 °F (36.6 °C)   TempSrc: Temporal   SpO2: 94%   Weight: 223 lb 6.4 oz (101.3 kg)   Height: 5' 2\" (1.575 m)     Body mass index is 40.86 kg/m². Based upon direct observation of the patient, evaluation of cognition reveals recent and remote memory intact. Patient's complete Health Risk Assessment and screening values have been reviewed and are found in Flowsheets. The following problems were reviewed today and where indicated follow up appointments were made and/or referrals ordered.     Positive Risk Factor Screenings with Interventions:           Health Habits/Nutrition:  Health Habits/Nutrition  Do you exercise for at least 20 minutes 2-3 times per week?: (!) No  Have you lost any weight without trying in the past 3 months?: No  Do you eat only one meal per day?: No  Have you seen the dentist within the past year?: Yes  Body mass index: (!) 40.86  Health Habits/Nutrition Interventions:  · Inadequate physical activity:  educational materials provided to promote increased physical activity  · Nutritional issues:  educational materials for healthy, well-balanced diet provided, educational materials to promote weight loss provided     Safety:  Safety  Do you have working smoke detectors?: Yes  Have all throw rugs been removed or fastened?: (!) No  Do you have non-slip mats or surfaces in all bathtubs/showers?: (!) No  Do all of your stairways have a railing or banister?: Yes  Are your doorways, halls and stairs free of clutter?: Yes  Do you always fasten your seatbelt when you are in a car?: Yes  Safety Interventions:  · Home safety tips provided    ADL:  ADLs  In the past 7 days, did you need help from others to perform any of the following everyday activities? Eating, dressing, grooming, bathing, toileting, or walking/balance?: None  In the past 7 days, did you need help from others to take care of any of the following?  Laundry, housekeeping, banking/finances, shopping, telephone use, food preparation, transportation, or taking medications?: (!) Housekeeping, Laundry  ADL Interventions:  · Patient declines any further evaluation/treatment for this issue    Personalized Preventive Plan   Current Health Maintenance Status  Immunization History   Administered Date(s) Administered    Influenza, High Dose (Fluzone 65 yrs and older) 10/06/2015, 12/23/2016, 12/18/2017, 10/08/2018    Influenza, Quadv, adjuvanted, 65 yrs +, IM, PF (Fluad) 10/01/2020    Pneumococcal Conjugate 13-valent (Temipmt82) 12/23/2016    Pneumococcal Polysaccharide ([id number]) 05/17/2011    Tdap (Boostrix, Adacel) 12/18/2017    Zoster Recombinant (Shingrix) 11/06/2020        Health Maintenance   Topic Date Due    COVID-19 Vaccine (1) Never done   Nabil Prescott

## 2021-05-06 ENCOUNTER — HOSPITAL ENCOUNTER (OUTPATIENT)
Dept: LAB | Age: 81
Discharge: HOME | End: 2021-05-06
Payer: MEDICARE

## 2021-05-06 DIAGNOSIS — E03.9 HYPOTHYROIDISM, UNSPECIFIED TYPE: Chronic | ICD-10-CM

## 2021-05-06 DIAGNOSIS — R73.03 PRE-DIABETES: ICD-10-CM

## 2021-05-06 DIAGNOSIS — I10 ESSENTIAL HYPERTENSION: Chronic | ICD-10-CM

## 2021-05-06 DIAGNOSIS — E78.5 HYPERLIPIDEMIA, UNSPECIFIED HYPERLIPIDEMIA TYPE: Chronic | ICD-10-CM

## 2021-05-06 LAB
ANION GAP SERPL CALCULATED.3IONS-SCNC: 12 MEQ/L (ref 9–15)
BUN BLDV-MCNC: 19 MG/DL (ref 8–23)
CALCIUM SERPL-MCNC: 10.6 MG/DL (ref 8.5–9.9)
CHLORIDE BLD-SCNC: 100 MEQ/L (ref 95–107)
CHOLESTEROL, TOTAL: 210 MG/DL (ref 0–199)
CO2: 30 MEQ/L (ref 20–31)
CREAT SERPL-MCNC: 0.74 MG/DL (ref 0.5–0.9)
GFR AFRICAN AMERICAN: >60
GFR NON-AFRICAN AMERICAN: >60
GLUCOSE BLD-MCNC: 102 MG/DL (ref 70–99)
HDLC SERPL-MCNC: 45 MG/DL (ref 40–59)
LDL CHOLESTEROL CALCULATED: 119 MG/DL (ref 0–129)
POTASSIUM SERPL-SCNC: 4.2 MEQ/L (ref 3.4–4.9)
SODIUM BLD-SCNC: 142 MEQ/L (ref 135–144)
T4 FREE: 1.41 NG/DL (ref 0.84–1.68)
TRIGL SERPL-MCNC: 230 MG/DL (ref 0–150)
TSH SERPL DL<=0.05 MIU/L-ACNC: 3.66 UIU/ML (ref 0.44–3.86)

## 2021-05-06 PROCEDURE — 36415 COLL VENOUS BLD VENIPUNCTURE: CPT

## 2021-05-06 PROCEDURE — 84439 ASSAY OF FREE THYROXINE: CPT

## 2021-05-06 PROCEDURE — 84443 ASSAY THYROID STIM HORMONE: CPT

## 2021-05-06 PROCEDURE — 80048 BASIC METABOLIC PNL TOTAL CA: CPT

## 2021-05-06 PROCEDURE — 80061 LIPID PANEL: CPT

## 2021-05-13 DIAGNOSIS — E83.52 HYPERCALCEMIA: Primary | ICD-10-CM

## 2021-05-13 NOTE — RESULT ENCOUNTER NOTE
Please notify patient that recent lab work shows the followin. She has normal thyroid testing and normal kidney function testing  2. Her cholesterol testing shows continued high total cholesterol and high triglycerides. She should double down her efforts to eat a lower carbohydrate and lower saturated fat diet. We will continue to follow her lab work over time  3. Her serum calcium level is elevated. She should stop any calcium supplements she is currently taking. I would like to repeat/follow-up testing in 2 to 3 weeks to reassess.   Future order has been left in her chart

## 2021-08-03 ENCOUNTER — HOSPITAL ENCOUNTER (OUTPATIENT)
Dept: LAB | Age: 81
Discharge: HOME | End: 2021-08-03
Payer: MEDICARE

## 2021-08-03 ENCOUNTER — OFFICE VISIT (OUTPATIENT)
Dept: FAMILY MEDICINE CLINIC | Age: 81
End: 2021-08-03
Payer: MEDICARE

## 2021-08-03 VITALS
TEMPERATURE: 97.8 F | HEART RATE: 85 BPM | BODY MASS INDEX: 42.1 KG/M2 | WEIGHT: 223 LBS | DIASTOLIC BLOOD PRESSURE: 70 MMHG | SYSTOLIC BLOOD PRESSURE: 130 MMHG | HEIGHT: 61 IN | OXYGEN SATURATION: 93 %

## 2021-08-03 DIAGNOSIS — E83.52 HYPERCALCEMIA: ICD-10-CM

## 2021-08-03 DIAGNOSIS — R31.9 PAINLESS HEMATURIA: Primary | ICD-10-CM

## 2021-08-03 DIAGNOSIS — R31.9 PAINLESS HEMATURIA: ICD-10-CM

## 2021-08-03 LAB
CALCIUM SERPL-MCNC: 10.8 MG/DL (ref 8.5–9.9)
HCT VFR BLD CALC: 42.6 % (ref 37–47)
HEMOGLOBIN: 13.8 G/DL (ref 12–16)
MCH RBC QN AUTO: 28.2 PG (ref 27–31.3)
MCHC RBC AUTO-ENTMCNC: 32.5 % (ref 33–37)
MCV RBC AUTO: 86.7 FL (ref 82–100)
PARATHYROID HORMONE INTACT: 54.4 PG/ML (ref 15–65)
PDW BLD-RTO: 13.4 % (ref 11.5–14.5)
PLATELET # BLD: 307 K/UL (ref 130–400)
RBC # BLD: 4.91 M/UL (ref 4.2–5.4)
WBC # BLD: 7.4 K/UL (ref 4.8–10.8)

## 2021-08-03 PROCEDURE — 99213 OFFICE O/P EST LOW 20 MIN: CPT | Performed by: NURSE PRACTITIONER

## 2021-08-03 PROCEDURE — 82310 ASSAY OF CALCIUM: CPT

## 2021-08-03 PROCEDURE — 85027 COMPLETE CBC AUTOMATED: CPT

## 2021-08-03 PROCEDURE — 81003 URINALYSIS AUTO W/O SCOPE: CPT | Performed by: NURSE PRACTITIONER

## 2021-08-03 PROCEDURE — 36415 COLL VENOUS BLD VENIPUNCTURE: CPT

## 2021-08-03 PROCEDURE — 83970 ASSAY OF PARATHORMONE: CPT

## 2021-08-03 SDOH — ECONOMIC STABILITY: FOOD INSECURITY: WITHIN THE PAST 12 MONTHS, YOU WORRIED THAT YOUR FOOD WOULD RUN OUT BEFORE YOU GOT MONEY TO BUY MORE.: NEVER TRUE

## 2021-08-03 SDOH — ECONOMIC STABILITY: FOOD INSECURITY: WITHIN THE PAST 12 MONTHS, THE FOOD YOU BOUGHT JUST DIDN'T LAST AND YOU DIDN'T HAVE MONEY TO GET MORE.: NEVER TRUE

## 2021-08-03 ASSESSMENT — ENCOUNTER SYMPTOMS
COLOR CHANGE: 0
BLOOD IN STOOL: 0
ABDOMINAL PAIN: 0
SHORTNESS OF BREATH: 0
COUGH: 0
WHEEZING: 0
CONSTIPATION: 0
NAUSEA: 0
VOMITING: 0
DIARRHEA: 0

## 2021-08-03 ASSESSMENT — SOCIAL DETERMINANTS OF HEALTH (SDOH): HOW HARD IS IT FOR YOU TO PAY FOR THE VERY BASICS LIKE FOOD, HOUSING, MEDICAL CARE, AND HEATING?: NOT HARD AT ALL

## 2021-08-03 NOTE — PATIENT INSTRUCTIONS
Urine had no blood in it today  We will still rule out infection by doing a culture  Please see urology  Labs today     Patient Education        Blood in the Urine: Care Instructions  Your Care Instructions     Blood in the urine, or hematuria, may make the urine look red, brown, or pink. There may be blood every time you urinate or just from time to time. You cannot always see blood in the urine, but it will show up in a urine test.  Blood in the urine may be serious. It should always be checked by a doctor. Your doctor may recommend more tests, including an X-ray, a CT scan, or a cystoscopy (which lets a doctor look inside the urethra and bladder). Blood in the urine can be a sign of another problem. Common causes are bladder infections and kidney stones. An injury to your groin or your genital area can also cause bleeding in the urinary tract. Very hard exercisesuch as running a marathoncan cause blood in the urine. Blood in the urine can also be a sign of kidney disease or cancer in the bladder or kidney. Many cases of blood in the urine are caused by a harmless condition that runs in families. This is called benign familial hematuria. It does not need any treatment. Sometimes your urine may look red or brown even though it does not contain blood. For example, not getting enough fluids (dehydration), taking certain medicines, or having a liver problem can change the color of your urine. Eating foods such as beets, rhubarb, or blackberries or foods with red food coloring can make your urine look red or pink. Follow-up care is a key part of your treatment and safety. Be sure to make and go to all appointments, and call your doctor if you are having problems. It's also a good idea to know your test results and keep a list of the medicines you take. When should you call for help? Call your doctor now or seek immediate medical care if:    · You have symptoms of a urinary infection. For example:  ?  You have

## 2021-08-03 NOTE — PROGRESS NOTES
Subjective  Damián Nikki, 80 y.o. female presents today with:  Chief Complaint   Patient presents with    Urinary Frequency     Pt states that she is having some blood sightings when she uses the bathroom        HPI  Patient states she urinated and noticed some blood in her urine last night- states she did not have an infection at that time  This was about 2 months ago  Has not had any blood in her urine since then  Nothing vaginally   No dysuria, no frequency, no urgency     States blood was bright red   No gum bleeding, no dark or tarry stools no blood in stools   Denies abdominal pain     Does have hx of smoking     Review of Systems   Constitutional: Negative for chills, fatigue and fever. Respiratory: Negative for cough, shortness of breath and wheezing. Cardiovascular: Negative for chest pain and palpitations. Gastrointestinal: Negative for abdominal pain, blood in stool, constipation, diarrhea, nausea and vomiting. Endocrine: Negative for cold intolerance. Genitourinary: Positive for hematuria. Negative for dysuria, flank pain, frequency, urgency, vaginal bleeding and vaginal discharge. Denies hx kidney stones   Musculoskeletal:        Hx arthritis, takes tylenol arthritis    Skin: Negative for color change and pallor. Neurological: Negative for dizziness and numbness. Hematological: Bruises/bleeds easily (bruises).         Not on any blood thinners        Past Medical History:   Diagnosis Date    Abnormal ultrasound of breast     Abscess of abdominal wall 11/5/2016    Bilateral carpal tunnel syndrome 4/23/2018    Bleeding hemorrhoid 3/17/2015    Breast cancer, right ([facility] [address] 2003    s/p partial mastectomy, radiation    Carbuncle of abdominal wall 11/5/2016    Ductal carcinoma in situ of breast     right    Fibrocystic disease of right breast     Generalized osteoarthritis 8/4/2014    GERD (gastroesophageal reflux disease)     History of breast cancer in female 8/4/2014  HTN (hypertension) 2014    Hyperlipidemia     Hypertension     Hypothyroidism 2016    Insomnia 2015    Moderate persistent asthma 2020    Moderate persistent asthma 2020    MRSA (methicillin resistant Staphylococcus aureus) infection     MRSA infection 2016    LLQ abdominal wall    Obesity     Postmenopausal 2017    Pre-diabetes 2019    Scoliosis     Subclinical hypothyroidism 2014    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      Past Surgical History:   Procedure Laterality Date    ABSCESS DRAINAGE Left 2016    Abscess abdominal wall LLQ    BREAST BIOPSY Right 08/10/2017    DR LUIS    BREAST SURGERY N/A 2016    ABDOMINAL INCISION AND DRAINAGE performed by Oswald Burns MD at [address] [place] [address]  04/15/2005    COLONOSCOPY  2009    DR Ronn Bansal    COLONOSCOPY  2014    diverticulosis, polyps x 3 (DR HATCH)    DILATION AND CURETTAGE OF UTERUS      #1    DILATION AND CURETTAGE OF UTERUS      #2    HERNIA REPAIR      DR Bairon English HIP SURGERY Left 2018    JOINT REPLACEMENT Left     DR SANCHEZ, hip    MASTECTOMY, PARTIAL Right     IA REVISE MEDIAN N/CARPAL TUNNEL SURG Left 5/3/2018    LEFT WRIST CARPAL TUNNEL RELEASE performed by Melissa Feliz MD at [facility] N/CARPAL TUNNEL SURG Right 2018    RIGHT WRIST CARPAL TUNNEL RELEASE performed by Melissa Feliz MD at [phone]       Social History     Socioeconomic History    Marital status:      Spouse name: Poornima Pereira Number of children: 2    Years of education: 12    Highest education level: Not on file   Occupational History    Occupation: Retired     Comment: former teacher   Tobacco Use    Smoking status: Former Smoker     Packs/day: 1.50     Years: 21.00     Pack years: 31.50     Types: Cigarettes     Start date:      Quit date:      Years since quittin.6    Smokeless tobacco: Never Used   Substance and Sexual Activity    Alcohol use: Yes     Comment: infrequent    Drug use: No    Sexual activity: Never     Partners: Male     Comment: monogamous   Other Topics Concern    Not on file   Social History Narrative    Living with spouse     Social Determinants of Health     Financial Resource Strain: Low Risk     Difficulty of Paying Living Expenses: Not hard at all   Food Insecurity: No Food Insecurity    Worried About Running Out of Food in the Last Year: Never true    [address] in the Last Year: Never true   Transportation Needs:     Lack of Transportation (Medical):      Lack of Transportation (Non-Medical):    Physical Activity:     Days of Exercise per Week:     Minutes of Exercise per Session:    Stress:     Feeling of Stress :    Social Connections:     Frequency of Communication with Friends and Family:     Frequency of Social Gatherings with Friends and Family:     Attends Scientologist Services:     Active Member of Clubs or Organizations:     Attends Club or Organization Meetings:     Marital Status:    Intimate Partner Violence:     Fear of Current or Ex-Partner:     Emotionally Abused:     Physically Abused:     Sexually Abused:      Family History   Problem Relation Age of Onset    Heart Disease Father     Heart Attack Father     Diabetes Mother      Allergies   Allergen Reactions    Fenofibrate Other (See Comments)     myalgias    Statins Other (See Comments)     myalgias    Pcn [Penicillins] Rash     Current Outpatient Medications   Medication Sig Dispense Refill    ezetimibe (ZETIA) 10 MG tablet Take 1 tablet by mouth daily 90 tablet 1    levothyroxine (SYNTHROID) 50 MCG tablet Take 1 tablet by mouth Daily 90 tablet 1    triamterene-hydroCHLOROthiazide (DYAZIDE) 37.5-25 MG per capsule Take 1 capsule by mouth daily 90 capsule 1    metoprolol succinate (TOPROL XL) 25 MG extended release tablet Take 1 tablet by mouth daily 90 tablet 1    fluticasone-salmeterol (ADVAIR DISKUS) 250-50 MCG/DOSE AEPB Inhale 1 puff into the lungs every 12 hours 180 each 1    ketoconazole (NIZORAL) 2 % cream Apply topically twice daily for 2 - 4 weeks (or for 1 week after lesions have healed). 60 g 1    nystatin (NYAMYC) 201980 UNIT/GM powder apply to affected area twice a day 15 g 0    Handicap Placard MISC by Does not apply route DX: GENERALIZED OSTEOARTHRITIS (M15.9), HISTORY OF LEFT KNEE REPLACEMENT (L75.059)     EXPIRES: 10/2025 1 each 0    ipratropium (ATROVENT) 0.06 % nasal spray 2 sprays by Nasal route 3 times daily 1 Bottle 5    loperamide (IMODIUM) 2 MG capsule Take 2 mg by mouth 4 times daily as needed for Diarrhea      acetaminophen (TYLENOL) 500 MG tablet Take 500 mg by mouth every 6 hours as needed for Pain      Multiple Vitamin (MULTI-VITAMIN PO) Take 1 tablet by mouth daily. No current facility-administered medications for this visit. PMH, Surgical Hx, Family Hx, and Social Hx reviewed and updated. Health Maintenance reviewed. Objective  Vitals:    08/03/21 1305   BP: 130/70   Site: Right Upper Arm   Position: Sitting   Cuff Size: Large Adult   Pulse: 85   Temp: 97.8 °F (36.6 °C)   TempSrc: Temporal   SpO2: 93%   Weight: 223 lb (101.2 kg)   Height: 5' 1\" (1.549 m)     BP Readings from Last 3 Encounters:   08/03/21 130/70   04/30/21 138/86   11/11/20 122/86     Wt Readings from Last 3 Encounters:   08/03/21 223 lb (101.2 kg)   04/30/21 223 lb 6.4 oz (101.3 kg)   11/11/20 218 lb (98.9 kg)     Physical Exam  Constitutional:       Appearance: She is obese. HENT:      Head: Normocephalic. Cardiovascular:      Rate and Rhythm: Normal rate and regular rhythm. Pulses: Normal pulses. Heart sounds: Normal heart sounds. Pulmonary:      Effort: Pulmonary effort is normal. No respiratory distress. Breath sounds: Normal breath sounds. No wheezing. Abdominal:      General: Bowel sounds are normal. There is no distension. Palpations: Abdomen is soft. Tenderness: There is no abdominal tenderness. There is no right CVA tenderness or left CVA tenderness. Genitourinary:     Comments: refused  Musculoskeletal:         General: Normal range of motion. Cervical back: Normal range of motion. Skin:     General: Skin is warm and dry. Neurological:      Mental Status: She is alert and oriented to person, place, and time. Mental status is at baseline. Psychiatric:         Mood and Affect: Mood normal.         Behavior: Behavior normal.       Assessment & Plan    Diagnosis Orders   1. Painless hematuria  Ambulatory referral to Urology    CBC   no blood in urine today  Will send culture to r/o infection  CBC today  Should see urology -referral placed  F/u with PCP as well   Orders Placed This Encounter   Procedures    Culture, Urine     Standing Status:   Future     Standing Expiration Date:   8/3/2022     Order Specific Question:   Specify (ex-cath, midstream, cysto, etc)? Answer:   midstream    CBC     Standing Status:   Future     Standing Expiration Date:   8/3/2022    Ambulatory referral to Urology     Referral Priority:   Routine     Referral Type:   Eval and Treat     Referral Reason:   Specialty Services Required     Referred to Provider:   Stephanie Puri MD     Number of Visits Requested:   1    POCT Urinalysis No Micro (Auto)     No orders of the defined types were placed in this encounter. There are no discontinued medications. Return in about 1 week (around 8/10/2021), or if symptoms worsen or fail to improve, for follow up with PCP and urology . Reviewed with the patient: current clinical status,medications, activities and diet. Side effects, adverse effects of the medication prescribed today, as well as treatment plan/ rationale and result expectations have been discussed with the patient who expresses understanding and desires to proceed.     Close follow up to evaluate treatment results and for coordination of care. I have reviewed the patient's medical history in detail and updated the computerized patient record.     Win Aaron, APRN - CNP

## 2021-08-06 ENCOUNTER — OFFICE VISIT (OUTPATIENT)
Dept: UROLOGY | Age: 81
End: 2021-08-06
Payer: MEDICARE

## 2021-08-06 VITALS
DIASTOLIC BLOOD PRESSURE: 88 MMHG | BODY MASS INDEX: 41.16 KG/M2 | WEIGHT: 218 LBS | HEART RATE: 107 BPM | SYSTOLIC BLOOD PRESSURE: 138 MMHG | HEIGHT: 61 IN

## 2021-08-06 DIAGNOSIS — R31.0 GROSS HEMATURIA: Primary | ICD-10-CM

## 2021-08-06 LAB
BILIRUBIN, POC: ABNORMAL
BLOOD URINE, POC: ABNORMAL
CLARITY, POC: CLEAR
COLOR, POC: YELLOW
GLUCOSE URINE, POC: ABNORMAL
KETONES, POC: ABNORMAL
LEUKOCYTE EST, POC: ABNORMAL
NITRITE, POC: ABNORMAL
PH, POC: 5.5
PROTEIN, POC: ABNORMAL
SPECIFIC GRAVITY, POC: 1.02
UROBILINOGEN, POC: 0.2

## 2021-08-06 PROCEDURE — 81003 URINALYSIS AUTO W/O SCOPE: CPT | Performed by: UROLOGY

## 2021-08-06 PROCEDURE — 99203 OFFICE O/P NEW LOW 30 MIN: CPT | Performed by: UROLOGY

## 2021-08-11 DIAGNOSIS — E83.52 HYPERCALCEMIA: Primary | ICD-10-CM

## 2021-08-11 NOTE — RESULT ENCOUNTER NOTE
Please notify patient that her recent follow-up lab work shows that her calcium level remains elevated and continues to rise. Her parathyroid hormone level was within the normal range.   I would like to refer her to endocrinology for further evaluation, please help her schedule a visit with the specialist.

## 2021-08-13 ENCOUNTER — TELEPHONE (OUTPATIENT)
Dept: UROLOGY | Age: 81
End: 2021-08-13

## 2021-08-13 DIAGNOSIS — R31.0 GROSS HEMATURIA: Primary | ICD-10-CM

## 2021-08-13 NOTE — TELEPHONE ENCOUNTER
----- Message from Racheal Poon sent at 8/13/2021  9:39 AM EDT -----  Contact: [phone]  Mae Espinoza @ [facility]iology states patient is coming in next week for CT Abdomen Pelvis W/WO Contrast, and needs a STAT Creatinine blood order. Please advise.

## 2021-08-16 ENCOUNTER — HOSPITAL ENCOUNTER (OUTPATIENT)
Dept: LAB | Age: 81
Discharge: HOME | End: 2021-08-16
Payer: MEDICARE

## 2021-08-16 ENCOUNTER — OFFICE VISIT (OUTPATIENT)
Dept: FAMILY MEDICINE CLINIC | Age: 81
End: 2021-08-16
Payer: MEDICARE

## 2021-08-16 VITALS
OXYGEN SATURATION: 96 % | WEIGHT: 218 LBS | HEART RATE: 97 BPM | DIASTOLIC BLOOD PRESSURE: 80 MMHG | TEMPERATURE: 98.3 F | HEIGHT: 61 IN | SYSTOLIC BLOOD PRESSURE: 128 MMHG | BODY MASS INDEX: 41.16 KG/M2

## 2021-08-16 DIAGNOSIS — R31.0 GROSS HEMATURIA: ICD-10-CM

## 2021-08-16 DIAGNOSIS — H61.22 IMPACTED CERUMEN, LEFT EAR: Primary | ICD-10-CM

## 2021-08-16 DIAGNOSIS — H60.92 INFLAMMATION OF LEFT EAR CANAL: ICD-10-CM

## 2021-08-16 LAB
CREAT SERPL-MCNC: 0.73 MG/DL (ref 0.5–0.9)
GFR AFRICAN AMERICAN: >60
GFR NON-AFRICAN AMERICAN: >60

## 2021-08-16 PROCEDURE — 99213 OFFICE O/P EST LOW 20 MIN: CPT | Performed by: NURSE PRACTITIONER

## 2021-08-16 PROCEDURE — 36415 COLL VENOUS BLD VENIPUNCTURE: CPT

## 2021-08-16 PROCEDURE — 69210 REMOVE IMPACTED EAR WAX UNI: CPT | Performed by: NURSE PRACTITIONER

## 2021-08-16 PROCEDURE — 82565 ASSAY OF CREATININE: CPT

## 2021-08-16 RX ORDER — ACETIC ACID 20.65 MG/ML
2-3 SOLUTION AURICULAR (OTIC) 2 TIMES DAILY
Qty: 1 BOTTLE | Refills: 0 | Status: SHIPPED | OUTPATIENT
Start: 2021-08-16 | End: 2021-08-23

## 2021-08-16 ASSESSMENT — ENCOUNTER SYMPTOMS
COUGH: 0
SORE THROAT: 0

## 2021-08-16 NOTE — PROGRESS NOTES
[facility] Encounter  CHIEF COMPLAINT     Shana Lomeli is a 80 y.o. female who presents with:     Chief Complaint   Patient presents with    Otalgia     Pt states that letf ear pain started about 1-2 weeks ago. Pt states that it s tender to the touch      HISTORY OF PRESENT ILLNESS     HPI   Cerumen Complaint   Patient presents for evaluation of a plugged ear: the left ear  Onset: a couple weeks ago. She admits to mild ear pain. There is a prior history of cerumen impaction- several years ago  Patient denies self- instrumentation trauma, failure to adhere to water precaution instructions, improper administration of ototopic medications, immunosuppression, hx of psoriasis, contact dermatitis. Denies ear discharge, fever, sore throat, N/V/D. Patient was not using ear drops to loosen wax immediately prior to this visit. Patient's medications, allergies, past medical, surgical, social and family histories were reviewed and updated as appropriate. REVIEW OF SYSTEMS     Review of Systems   Constitutional: Negative for chills and fever. HENT: Positive for congestion and ear pain. Negative for nosebleeds, postnasal drip, sore throat and tinnitus. Respiratory: Negative for cough. Cardiovascular: Negative for chest pain. Gastrointestinal: Negative for abdominal pain and vomiting. Musculoskeletal: Negative for myalgias and neck pain. Neurological: Negative for dizziness, light-headedness and headaches.       PAST MEDICAL HISTORY         Diagnosis Date    Abnormal ultrasound of breast     Abscess of abdominal wall 11/5/2016    Bilateral carpal tunnel syndrome 4/23/2018    Bleeding hemorrhoid 3/17/2015    Breast cancer, right ([address] 2003    s/p partial mastectomy, radiation    Carbuncle of abdominal wall 11/5/2016    Ductal carcinoma in situ of breast     right    Fibrocystic disease of right breast     Generalized osteoarthritis 8/4/2014    GERD (gastroesophageal reflux disease)     History of breast cancer in female 8/4/2014    HTN (hypertension) 8/4/2014    Hyperlipidemia     Hypertension     Hypothyroidism 12/2/2016    Insomnia 6/16/2015    Moderate persistent asthma 11/25/2020    Moderate persistent asthma 11/25/2020    MRSA (methicillin resistant Staphylococcus aureus) infection     MRSA infection 11/2016    LLQ abdominal wall    Obesity     Postmenopausal 2/9/2017    Pre-diabetes 7/23/2019    Scoliosis     Subclinical hypothyroidism 5/6/2014    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   CURRENT MEDICATIONS       Previous Medications    ACETAMINOPHEN (TYLENOL) 500 MG TABLET    Take 500 mg by mouth every 6 hours as needed for Pain    EZETIMIBE (ZETIA) 10 MG TABLET    Take 1 tablet by mouth daily    FLUTICASONE-SALMETEROL (ADVAIR DISKUS) 250-50 MCG/DOSE AEPB    Inhale 1 puff into the lungs every 12 hours    HANDICAP PLACARD MISC    by Does not apply route DX: GENERALIZED OSTEOARTHRITIS (M15.9), HISTORY OF LEFT KNEE REPLACEMENT (J25.396)     EXPIRES: 10/2025    IPRATROPIUM (ATROVENT) 0.06 % NASAL SPRAY    2 sprays by Nasal route 3 times daily    LEVOTHYROXINE (SYNTHROID) 50 MCG TABLET    Take 1 tablet by mouth Daily    LOPERAMIDE (IMODIUM) 2 MG CAPSULE    Take 2 mg by mouth 4 times daily as needed for Diarrhea     METOPROLOL SUCCINATE (TOPROL XL) 25 MG EXTENDED RELEASE TABLET    Take 1 tablet by mouth daily    MULTIPLE VITAMIN (MULTI-VITAMIN PO)    Take 1 tablet by mouth daily. MULTIPLE VITAMINS-MINERALS (PRESERVISION AREDS 2 PO)    Take by mouth    TRIAMTERENE-HYDROCHLOROTHIAZIDE (DYAZIDE) 37.5-25 MG PER CAPSULE    Take 1 capsule by mouth daily     ALLERGIES     Patient is is allergic to fenofibrate, statins, and pcn [penicillins]. FAMILY HISTORY     Patient'sfamily history includes Diabetes in her mother; Heart Attack in her father; Heart Disease in her father. SOCIAL HISTORY     Patient  reports that she quit smoking about 41 years ago. Her smoking use included cigarettes. She started smoking about 62 years ago. She has a 31.50 pack-year smoking history. She has never used smokeless tobacco. She reports current alcohol use. She reports that she does not use drugs. PHYSICAL EXAM       Vitals:    08/16/21 1224   BP: 128/80   Site: Right Upper Arm   Position: Sitting   Cuff Size: Medium Adult   Pulse: 97   Temp: 98.3 °F (36.8 °C)   TempSrc: Temporal   SpO2: 96%   Weight: 218 lb (98.9 kg)   Height: 5' 1\" (1.549 m)     Physical Exam   General: alert, appears stated age, cooperative, and no distress. Right Ear: right TM normal landmarks, non-tender and right canal normal.   Left Ear:  left TM could not see and left canal ceruminous: irrigated. After removal: left TM normal and external auditory canal mildly inflamed. Vital signs reviewed. READY CARE COURSE   Labs:  No results found for this visit on 08/16/21. IMAGING:  No orders to display     Scheduled Meds:  Continuous Infusions:  PRN Meds:. PROCEDURES:  FINAL IMPRESSION    80 y.o. female with plugged sensation in left ear. 1. Impacted cerumen, left ear  Comments:  ceruminosis is noted.  wax is removed by syringing and manual debridement. ear drops for inflammatory relief/ infection control prescribed  Orders:  -     23391 - ID REMOVE IMPACTED EAR WAX  -     carbamide peroxide (DEBROX) 6.5 % otic solution; Place 5 drops in ear(s) 2 times daily as needed (wax build-up), Disp-1 Bottle, R-0Normal  2. Inflammation of left ear canal  -     acetic acid (VOSOL) 2 % otic solution; Place 2-3 drops into the left ear 2 times daily for 7 days, Disp-1 Bottle, R-0Normal    DISPOSITION/PLAN     PATIENT REFERRED TO:  Return for follow-up in 2 to 3 days if symptoms are not improving, otherwise as needed. DISCHARGE MEDICATIONS:  New Prescriptions    ACETIC ACID (VOSOL) 2 % OTIC SOLUTION    Place 2-3 drops into the left ear 2 times daily for 7 days    CARBAMIDE PEROXIDE (DEBROX) 6.5 % OTIC SOLUTION    Place 5 drops in ear(s) 2 times daily as needed (wax build-up)        Ear Cerumen Removal Procedure Note  Indication: ear cerumen impaction and unable to visualize tympanic membrane  Procedure: After placing the patient's head in the appropriate position, the patient's left ear canal was gently irrigated with the appropriate solution until  the majority of the cerumen was flushed out of the canal.  The remaining cerumen was removed by curette. At this point, the procedure was complete. Tympanic membrane(s) intact following the procedure. Auditory canal appears inflamed. Instructions for water exclusion given. The patient tolerated the procedure well. Complications: None       Side effects and adverse effects of any medication prescribed today, as well as treatment plan/rationale, follow-up care, and result expectations have been discussed with the patient. León Valentine expresses understanding and wishes to proceed with the plan. Discussed signs and symptoms which require immediate follow-up in ED/call to 911. Understanding verbalized. I have reviewed and updated the electronic medical record.     Electronically signed by CRISTOPHER Arias CNP on 8/16/2021 at 1:32 PM

## 2021-08-16 NOTE — PATIENT INSTRUCTIONS
1. Prescription for ear drops with acetic acid help to dry the ear, prevent skin maceration, and re-acidify the ear canal were sent to your pharmacy   2. Apply per instruction  3. Refrain from swimming or submerging affected ear(s) + no headphones or ear plugs for 5-7 days  4. Avoid the use of cotton tip applicators for routine cleaning  5. If you want to clean your ears, you can wash the external ear with a cloth over a finger, but do not insert anything into the ear canal  6. Gently clean the outer ear canals using a cloth or tissue wrapped around your finger    Self- Treatment  1. Debrox ear drops aid to soften, loosen, and remove excessive earwax  2. A common regimen is: Debrox® Ear Drops: 5 drops in each ear at bedtime 1-2 nights per month  3. If using the above regimens, it is not necessary to irrigate or wash your ear  4. The drops will dissolve small amounts of wax and prevent a major accumulation or impaction of wax  5. Wax softening drops are intended to soften wax over a period of hours to days --> the drops should not be used daily for > 5 days    Follow-up as needed if symptoms do not improve in the following days--> hearing loss, discharge from your ear, headaches, fever, vomiting,  any other concerning symptoms    Patient Education   Earwax Blockage: Care Instructions  Your Care Instructions     Earwax is a natural substance that protects the ear canal. Normally, earwax drains from the ears and does not cause problems. Sometimes earwax builds up and hardens. Earwax blockage (also called cerumen impaction) can cause some loss of hearing and pain. When wax is tightly packed, you will need to have your doctor remove it. Follow-up care is a key part of your treatment and safety. Be sure to make and go to all appointments, and call your doctor if you are having problems. It's also a good idea to know your test results and keep a list of the medicines you take.   How can you care for yourself at home?  · Do not try to remove earwax with cotton swabs, fingers, or other objects. This can make the blockage worse and damage the eardrum. · If your doctor recommends that you try to remove earwax at home:  ? Soften and loosen the earwax with warm mineral oil. You also can try hydrogen peroxide mixed with an equal amount of room temperature water. Place 2 drops of the fluid, warmed to body temperature, in the ear two times a day for up to 5 days. ? Once the wax is loose and soft, all that is usually needed to remove it from the ear canal is a gentle, warm shower. Direct the water into the ear, then tip your head to let the earwax drain out. Dry your ear thoroughly with a hair dryer set on low. Hold the dryer several inches from your ear. ? If the warm mineral oil and shower do not work, use an over-the-counter wax softener. Read and follow all instructions on the label. After using the wax softener, use an ear syringe to gently flush the ear. Make sure the flushing solution is body temperature. Cool or hot fluids in the ear can cause dizziness. When should you call for help? Call your doctor now or seek immediate medical care if:    · Pus or blood drains from your ear.     · Your ears are ringing or feel full.     · You have a loss of hearing. Watch closely for changes in your health, and be sure to contact your doctor if:    · You have pain or reduced hearing after 1 week of home treatment.     · You have any new symptoms, such as nausea or balance problems. Where can you learn more? Go to [email or web address] and sign in to your [employer] account. Enter X561 in the [facility] box to learn more about \"Earwax Blockage: Care Instructions. \"     If you do not have an account, please click on the \"Sign Up Now\" link. Current as of: October 19, 2020               Content Version: 12.9  © 9343-3615 [employer], [employer]orporated.    Care instructions adapted under license by [facility]. If you have questions about a medical condition or this instruction, always ask your healthcare professional. [place] [address] any warranty or liability for your use of this information.

## 2021-08-20 ENCOUNTER — HOSPITAL ENCOUNTER (OUTPATIENT)
Dept: CT IMAGING | Age: 81
Discharge: HOME | End: 2021-08-22
Payer: MEDICARE

## 2021-08-20 DIAGNOSIS — R31.0 GROSS HEMATURIA: ICD-10-CM

## 2021-08-20 PROCEDURE — 74178 CT ABD&PLV WO CNTR FLWD CNTR: CPT

## 2021-08-20 PROCEDURE — 6360000004 HC RX CONTRAST MEDICATION: Performed by: UROLOGY

## 2021-08-20 RX ADMIN — IOPAMIDOL 100 ML: 755 INJECTION, SOLUTION INTRAVENOUS at 09:20

## 2021-08-23 ENCOUNTER — PROCEDURE VISIT (OUTPATIENT)
Dept: UROLOGY | Age: 81
End: 2021-08-23
Payer: MEDICARE

## 2021-08-23 VITALS
SYSTOLIC BLOOD PRESSURE: 116 MMHG | HEART RATE: 107 BPM | WEIGHT: 217 LBS | BODY MASS INDEX: 40.97 KG/M2 | DIASTOLIC BLOOD PRESSURE: 72 MMHG | HEIGHT: 61 IN

## 2021-08-23 DIAGNOSIS — N83.202 CYST OF LEFT OVARY: ICD-10-CM

## 2021-08-23 DIAGNOSIS — R31.0 GROSS HEMATURIA: Primary | ICD-10-CM

## 2021-08-23 LAB
BILIRUBIN, POC: ABNORMAL
BLOOD URINE, POC: ABNORMAL
CLARITY, POC: CLEAR
COLOR, POC: YELLOW
GLUCOSE URINE, POC: ABNORMAL
KETONES, POC: ABNORMAL
LEUKOCYTE EST, POC: ABNORMAL
NITRITE, POC: ABNORMAL
PH, POC: 6
PROTEIN, POC: ABNORMAL
SPECIFIC GRAVITY, POC: 1.02
UROBILINOGEN, POC: 0.2

## 2021-08-23 PROCEDURE — 81003 URINALYSIS AUTO W/O SCOPE: CPT | Performed by: UROLOGY

## 2021-08-23 PROCEDURE — 52000 CYSTOURETHROSCOPY: CPT | Performed by: UROLOGY

## 2021-08-23 RX ORDER — SULFAMETHOXAZOLE AND TRIMETHOPRIM 800; 160 MG/1; MG/1
1 TABLET ORAL ONCE
Qty: 1 TABLET | Refills: 0 | COMMUNITY
Start: 2021-08-23 | End: 2021-08-23

## 2021-09-13 ASSESSMENT — ENCOUNTER SYMPTOMS
VOMITING: 0
ABDOMINAL PAIN: 0

## 2021-10-04 ENCOUNTER — OFFICE VISIT (OUTPATIENT)
Dept: OBGYN CLINIC | Age: 81
End: 2021-10-04
Payer: MEDICARE

## 2021-10-04 VITALS
SYSTOLIC BLOOD PRESSURE: 136 MMHG | HEART RATE: 108 BPM | DIASTOLIC BLOOD PRESSURE: 74 MMHG | BODY MASS INDEX: 41.35 KG/M2 | WEIGHT: 219 LBS | HEIGHT: 61 IN

## 2021-10-04 DIAGNOSIS — N83.209 CYST OF OVARY, UNSPECIFIED LATERALITY: Primary | ICD-10-CM

## 2021-10-04 DIAGNOSIS — C56.9 MALIGNANT NEOPLASM OF OVARY, UNSPECIFIED LATERALITY (HCC): ICD-10-CM

## 2021-10-04 PROCEDURE — 99203 OFFICE O/P NEW LOW 30 MIN: CPT | Performed by: OBSTETRICS & GYNECOLOGY

## 2021-10-04 NOTE — PROGRESS NOTES
Shannan Nice is a 80 y.o. female who presents here today for complaints of Abnormal Test Results (CT done 8/20/21. Referred by Dr. Emigdio Cadet.)    CT scan :     4. A large cyst is seen in the left ovary. Consider pelvic ultrasound for further evaluation  .       Vitals:  /74 (Site: Right Upper Arm, Position: Sitting, Cuff Size: Large Adult)   Pulse 108   Ht 5' 1\" (1.549 m)   Wt 219 lb (99.3 kg)   BMI 41.38 kg/m²   Allergies:  Fenofibrate, Statins, and Pcn [penicillins]  Past Medical History:   Diagnosis Date    Abnormal ultrasound of breast     Abscess of abdominal wall 11/5/2016    Bilateral carpal tunnel syndrome 4/23/2018    Bleeding hemorrhoid 3/17/2015    Breast cancer, right ([address] 2003    s/p partial mastectomy, radiation    Carbuncle of abdominal wall 11/5/2016    Ductal carcinoma in situ of breast     right    Fibrocystic disease of right breast     Generalized osteoarthritis 8/4/2014    GERD (gastroesophageal reflux disease)     History of breast cancer in female 8/4/2014    HTN (hypertension) 8/4/2014    Hyperlipidemia     Hypertension     Hypothyroidism 12/2/2016    Insomnia 6/16/2015    Moderate persistent asthma 11/25/2020    Moderate persistent asthma 11/25/2020    MRSA (methicillin resistant Staphylococcus aureus) infection     MRSA infection 11/2016    LLQ abdominal wall    Obesity     Postmenopausal 2/9/2017    Pre-diabetes 7/23/2019    Scoliosis     Subclinical hypothyroidism 5/6/2014    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      Past Surgical History:   Procedure Laterality Date    ABSCESS DRAINAGE Left 11/2016    Abscess abdominal wall LLQ    BREAST BIOPSY Right 08/10/2017    DR LUIS    BREAST SURGERY N/A 11/6/2016    ABDOMINAL INCISION AND DRAINAGE performed by Augustine Griffith MD at [address] [place] [address]  04/15/2005    COLONOSCOPY  05/19/2009    DR HATCH    COLONOSCOPY  09/18/2014    diverticulosis, polyps x 3 (DR HATCH)    DILATION AND CURETTAGE OF UTERUS      #1    DILATION AND CURETTAGE OF UTERUS      #2    HERNIA REPAIR      DR Daisy Sargent HIP SURGERY Left 2018    JOINT REPLACEMENT Left     DR SANCHEZ, hip    MASTECTOMY, PARTIAL Right     MA REVISE MEDIAN N/CARPAL TUNNEL SURG Left 5/3/2018    LEFT WRIST CARPAL TUNNEL RELEASE performed by Rosamaria Fierro MD at [address] [facility] [address] N/CARPAL TUNNEL SURG Right 2018    RIGHT WRIST CARPAL TUNNEL RELEASE performed by Rosamaria Fierro MD at [address]       OB History    No obstetric history on file. Family History   Problem Relation Age of Onset    Heart Disease Father     Heart Attack Father     Diabetes Mother      Social History     Socioeconomic History    Marital status:      Spouse name: Jeraline Boxer Number of children: 2    Years of education: 12    Highest education level: Not on file   Occupational History    Occupation: Retired     Comment: former teacher   Tobacco Use    Smoking status: Former Smoker     Packs/day: 1.50     Years: 21.00     Pack years: 31.50     Types: Cigarettes     Start date:      Quit date:      Years since quittin.7    Smokeless tobacco: Never Used   Substance and Sexual Activity    Alcohol use: Yes     Comment: infrequent    Drug use: No    Sexual activity: Never     Partners: Male     Comment: monogamous   Other Topics Concern    Not on file   Social History Narrative    Living with spouse     Social Determinants of Health     Financial Resource Strain: Low Risk     Difficulty of Paying Living Expenses: Not hard at all   Food Insecurity: No Food Insecurity    Worried About Running Out of Food in the Last Year: Never true    [address] in the Last Year: Never true   Transportation Needs:     Lack of Transportation (Medical):      Lack of Transportation (Non-Medical):    Physical Activity:     Days of Exercise per Week:     Minutes of Exercise per Session: Stress:     Feeling of Stress :    Social Connections:     Frequency of Communication with Friends and Family:     Frequency of Social Gatherings with Friends and Family:     Attends Zoroastrian Services:     Active Member of Clubs or Organizations:     Attends Club or Organization Meetings:     Marital Status:    Intimate Partner Violence:     Fear of Current or Ex-Partner:     Emotionally Abused:     Physically Abused:     Sexually Abused:        Contraceptive method:  none    Patient's medications, allergies, past medical, surgical, social and family histories were reviewed and updated as appropriate. Review of Systems  As per chief complaint   All other systems reviewed and are negative. Physical Exam:  Vitals:  /74 (Site: Right Upper Arm, Position: Sitting, Cuff Size: Large Adult)   Pulse 108   Ht 5' 1\" (1.549 m)   Wt 219 lb (99.3 kg)   BMI 41.38 kg/m²   Lungs: CTAB   Heart : Regular S1/S2, no M/R/G  Abdomen: Soft , NT, ND , + BS   Pelvic exam : deferred    Assessment:      Diagnosis Orders   1. Cyst of ovary, unspecified laterality  Ca 125    US NON OB TRANSVAGINAL    US PELVIS COMPLETE   2. Malignant neoplasm of ovary, unspecified laterality ([address]   Ca 125       Plan:     US and ca-125  F/u in 1-2 wks       Orders Placed This Encounter   Procedures    US NON OB TRANSVAGINAL     Standing Status:   Future     Standing Expiration Date:   10/4/2022    US PELVIS COMPLETE     Standing Status:   Future     Standing Expiration Date:   10/4/2022     Order Specific Question:   Reason for exam:     Answer:   AUB    Ca 125     Standing Status:   Future     Standing Expiration Date:   10/4/2022     No orders of the defined types were placed in this encounter. Follow Up:  Return in about 2 weeks (around 10/18/2021) for F/U for results.         Rajat Mata MD

## 2021-10-19 ENCOUNTER — HOSPITAL ENCOUNTER (OUTPATIENT)
Dept: ULTRASOUND IMAGING | Age: 81
Discharge: HOME | End: 2021-10-21
Payer: MEDICARE

## 2021-10-19 DIAGNOSIS — N83.209 CYST OF OVARY, UNSPECIFIED LATERALITY: ICD-10-CM

## 2021-10-19 PROCEDURE — 76856 US EXAM PELVIC COMPLETE: CPT

## 2021-10-19 PROCEDURE — 76830 TRANSVAGINAL US NON-OB: CPT

## 2021-10-26 ENCOUNTER — OFFICE VISIT (OUTPATIENT)
Dept: FAMILY MEDICINE CLINIC | Age: 81
End: 2021-10-26
Payer: MEDICARE

## 2021-10-26 ENCOUNTER — OFFICE VISIT (OUTPATIENT)
Dept: OBGYN CLINIC | Age: 81
End: 2021-10-26
Payer: MEDICARE

## 2021-10-26 VITALS
HEIGHT: 61 IN | OXYGEN SATURATION: 94 % | HEART RATE: 93 BPM | RESPIRATION RATE: 12 BRPM | TEMPERATURE: 97.9 F | DIASTOLIC BLOOD PRESSURE: 78 MMHG | WEIGHT: 220 LBS | BODY MASS INDEX: 41.54 KG/M2 | SYSTOLIC BLOOD PRESSURE: 132 MMHG

## 2021-10-26 VITALS
HEIGHT: 61 IN | WEIGHT: 219 LBS | BODY MASS INDEX: 41.35 KG/M2 | DIASTOLIC BLOOD PRESSURE: 86 MMHG | HEART RATE: 88 BPM | SYSTOLIC BLOOD PRESSURE: 138 MMHG

## 2021-10-26 DIAGNOSIS — N83.209 CYST OF OVARY, UNSPECIFIED LATERALITY: Primary | ICD-10-CM

## 2021-10-26 DIAGNOSIS — Z23 NEED FOR VACCINATION: ICD-10-CM

## 2021-10-26 DIAGNOSIS — E03.9 HYPOTHYROIDISM, UNSPECIFIED TYPE: Chronic | ICD-10-CM

## 2021-10-26 DIAGNOSIS — J45.40 MODERATE PERSISTENT ASTHMA WITHOUT COMPLICATION: Chronic | ICD-10-CM

## 2021-10-26 DIAGNOSIS — E83.52 HYPERCALCEMIA: ICD-10-CM

## 2021-10-26 DIAGNOSIS — R73.03 PRE-DIABETES: ICD-10-CM

## 2021-10-26 DIAGNOSIS — I10 PRIMARY HYPERTENSION: Primary | Chronic | ICD-10-CM

## 2021-10-26 DIAGNOSIS — E78.5 HYPERLIPIDEMIA, UNSPECIFIED HYPERLIPIDEMIA TYPE: Chronic | ICD-10-CM

## 2021-10-26 DIAGNOSIS — K21.9 GASTROESOPHAGEAL REFLUX DISEASE, UNSPECIFIED WHETHER ESOPHAGITIS PRESENT: Chronic | ICD-10-CM

## 2021-10-26 DIAGNOSIS — E66.01 MORBIDLY OBESE (HCC): ICD-10-CM

## 2021-10-26 PROCEDURE — 99214 OFFICE O/P EST MOD 30 MIN: CPT | Performed by: FAMILY MEDICINE

## 2021-10-26 PROCEDURE — G0008 ADMIN INFLUENZA VIRUS VAC: HCPCS | Performed by: FAMILY MEDICINE

## 2021-10-26 PROCEDURE — 90694 VACC AIIV4 NO PRSRV 0.5ML IM: CPT | Performed by: FAMILY MEDICINE

## 2021-10-26 PROCEDURE — 99213 OFFICE O/P EST LOW 20 MIN: CPT | Performed by: OBSTETRICS & GYNECOLOGY

## 2021-10-26 RX ORDER — ALBUTEROL SULFATE 90 UG/1
2 AEROSOL, METERED RESPIRATORY (INHALATION) EVERY 6 HOURS PRN
Qty: 18 G | Refills: 0 | Status: SHIPPED | OUTPATIENT
Start: 2021-10-26 | End: 2022-05-23

## 2021-10-26 ASSESSMENT — ENCOUNTER SYMPTOMS
CONSTIPATION: 0
DIARRHEA: 0
CHEST TIGHTNESS: 0
ABDOMINAL PAIN: 0
WHEEZING: 0
NAUSEA: 0
COUGH: 0
ANAL BLEEDING: 0
VOMITING: 0
SHORTNESS OF BREATH: 0
BLOOD IN STOOL: 0

## 2021-10-26 NOTE — ASSESSMENT & PLAN NOTE
Reviewed with patient at length the importance of further endocrinology evaluation. She agrees to schedule a visit with the specialist, I will have front office staff help her scheduling.

## 2021-10-26 NOTE — PROGRESS NOTES
Luciano Flores (: 1940) is a 80 y.o. female, Established patient, who presents today for:    Chief Complaint   Patient presents with    Hypertension         ASSESSMENT/PLAN    1. Primary hypertension  -     CBC Auto Differential; Future  -     Comprehensive Metabolic Panel; Future  2. Hyperlipidemia, unspecified hyperlipidemia type  -     Comprehensive Metabolic Panel; Future  -     Lipid Panel; Future  3. Moderate persistent asthma without complication  -     albuterol sulfate HFA (PROVENTIL HFA) 108 (90 Base) MCG/ACT inhaler; Inhale 2 puffs into the lungs every 6 hours as needed for Wheezing or Shortness of Breath, Disp-18 g, R-0Normal  4. Gastroesophageal reflux disease, unspecified whether esophagitis present  Assessment & Plan:  Managed well with avoidance of dietary triggers. Patient instructed to continue with lifestyle management  5. Hypothyroidism, unspecified type  -     TSH without Reflex; Future  -     T4, Free; Future  6. Pre-diabetes  -     Comprehensive Metabolic Panel; Future  -     Hemoglobin A1C; Future  7. Morbidly obese ([address]  Assessment & Plan:  Patient counseled on healthy dietary choices and the benefits of a lower salt and/or lower carbohydrate diet as appropriate. Patient also counseled on benefits of moderate intensity cardiovascular exercise for 20-30 minutes at least 3-5 days a week. Advice was given to make small changes over time, setting smaller achievable goals until recommended lifestyle changes are reached. Orders:  -     TSH without Reflex; Future  -     T4, Free; Future  8. Hypercalcemia  Assessment & Plan:  Reviewed with patient at length the importance of further endocrinology evaluation. She agrees to schedule a visit with the specialist, I will have front office staff help her scheduling. Orders:  -     Comprehensive Metabolic Panel; Future  9.  Need for vaccination  -     INFLUENZA, QUADV, ADJUVANTED, 65 YRS =, IM, PF, PREFILL SYR, 0.5ML (FLUAD)      Return for Nurse Visit BP/pulse check in 1-2 weeks, Annual Wellness Visit in 7 Months. SUBJECTIVE/OBJECTIVE:    HPI    Patient presents for chronic hypertension, hyperlipidemia, asthma, GERD, hypothyroid, prediabetes, and obesity follow-up. Patient reports taking medications as prescribed since the most recent visit. They deny adhering to any specific lower carbohydrate or lower salt diet. They deny any routine exercise outside of normal day-to-day activity. Patient denies any chest pain, dyspnea, palpitations, lightheadedness, dizziness, worsening lower extremity edema, or syncope. Patient denies any dyspnea at rest, persistent wheezing, worsening cough, chest tightness, or limitation in normal day-to-day activity due to breathing. Patient denies any change in appetite, weight changes, dysphagia, early satiety, increased belching, sour brash/heartburn, abdominal pain, nausea/vomiting, diarrhea, constipation, melena, rectal bleeding, or hematochezia. They deny any polyuria or polydipsia, new onset vision changes, or new onset paresthesias.     Current Outpatient Medications on File Prior to Visit   Medication Sig Dispense Refill    triamterene-hydroCHLOROthiazide (DYAZIDE) 37.5-25 MG per capsule Take 1 capsule by mouth daily 90 capsule 1    metoprolol succinate (TOPROL XL) 25 MG extended release tablet Take 1 tablet by mouth daily 90 tablet 1    levothyroxine (SYNTHROID) 50 MCG tablet Take 1 tablet by mouth Daily 90 tablet 1    ipratropium (ATROVENT) 0.06 % nasal spray 2 sprays by Nasal route 3 times daily 15 mL 5    Multiple Vitamins-Minerals (PRESERVISION AREDS 2 PO) Take by mouth      ezetimibe (ZETIA) 10 MG tablet Take 1 tablet by mouth daily 90 tablet 1    fluticasone-salmeterol (ADVAIR DISKUS) 250-50 MCG/DOSE AEPB Inhale 1 puff into the lungs every 12 hours 180 each 1    Handicap Placard MISC by Does not apply route DX: GENERALIZED OSTEOARTHRITIS (M15.9), HISTORY OF LEFT KNEE REPLACEMENT (S16.140) EXPIRES: 10/2025 1 each 0    loperamide (IMODIUM) 2 MG capsule Take 2 mg by mouth 4 times daily as needed for Diarrhea       acetaminophen (TYLENOL) 500 MG tablet Take 500 mg by mouth every 6 hours as needed for Pain      Multiple Vitamin (MULTI-VITAMIN PO) Take 1 tablet by mouth daily. No current facility-administered medications on file prior to visit. Allergies   Allergen Reactions    Fenofibrate Other (See Comments)     myalgias    Statins Other (See Comments)     myalgias    Pcn [Penicillins] Rash        Review of Systems   Constitutional: Negative for appetite change, chills, diaphoresis, fatigue, fever and unexpected weight change. Eyes: Negative for visual disturbance. Respiratory: Negative for cough, chest tightness, shortness of breath and wheezing. Cardiovascular: Negative for chest pain, palpitations and leg swelling. No orthopnea, No PND   Gastrointestinal: Negative for abdominal pain, anal bleeding, blood in stool, constipation, diarrhea, nausea and vomiting. No heartburn, No melena   Endocrine: Negative for cold intolerance, heat intolerance, polydipsia, polyphagia and polyuria. Genitourinary: Negative for dysuria and hematuria. Musculoskeletal: Negative for myalgias. Skin: Negative for rash. Neurological: Negative for dizziness, syncope, weakness, light-headedness, numbness and headaches. Psychiatric/Behavioral: Negative for dysphoric mood. The patient is not nervous/anxious. Vitals:  /78 (Site: Right Upper Arm, Position: Sitting, Cuff Size: Large Adult)   Pulse 93   Temp 97.9 °F (36.6 °C) (Temporal)   Resp 12   Ht 5' 1\" (1.549 m)   Wt 220 lb (99.8 kg)   SpO2 94%   BMI 41.57 kg/m²     Physical Exam  Vitals reviewed. Constitutional:       General: She is not in acute distress. Appearance: She is not ill-appearing. Eyes:      General: No scleral icterus. Neck:      Thyroid: No thyroid mass, thyromegaly or thyroid tenderness. Vascular: No carotid bruit. Cardiovascular:      Rate and Rhythm: Normal rate and regular rhythm. Heart sounds: Normal heart sounds. No murmur heard. Pulmonary:      Effort: Pulmonary effort is normal. No respiratory distress. Breath sounds: Normal breath sounds. No wheezing, rhonchi or rales. Abdominal:      General: Bowel sounds are normal. There is no distension. Palpations: Abdomen is soft. Tenderness: There is no abdominal tenderness. There is no right CVA tenderness, left CVA tenderness, guarding or rebound. Musculoskeletal:      Cervical back: Neck supple. Right lower leg: No edema. Left lower leg: No edema. Lymphadenopathy:      Cervical: No cervical adenopathy. Skin:     Findings: No rash. Neurological:      Mental Status: She is alert and oriented to person, place, and time. Psychiatric:         Mood and Affect: Mood normal.         Behavior: Behavior normal.         Thought Content: Thought content normal.         Ortho Exam (If Applicable)              An electronic signature was used to authenticate this note.      Lacho Amezquita MD

## 2021-10-26 NOTE — ASSESSMENT & PLAN NOTE
Managed well with avoidance of dietary triggers.   Patient instructed to continue with lifestyle management

## 2021-10-27 NOTE — PROGRESS NOTES
Amador Du is a 80 y.o. female who presents here today for complaints of Follow-up (US done 10/19/21.)    US result:   SIGNIFICANTLY COMPROMISED PELVIC ULTRASOUND. THE LOW-ATTENUATION LESION IN THE LEFT ADNEXA DEMONSTRATED ON CT CANNOT BE IMAGED ON THE ULTRASOUND EXAMINATION AND IS NOT FURTHER CHARACTERIZED. THE UTERUS AND ENDOMETRIAL ECHO COMPLEX ARE ALSO    NOT ADEQUATELY EVALUATED. NEITHER OVARY IS IMAGED. Previous CT result : In the left hemipelvis a cystic structure seen that measures 4.9 x 4.8 cm. In the right hemipelvis a 1.7 x 2.2 cm hypodense structure    is seen. These are thought to be the patient's ovaries. The uterus is partially visualized but is limited due to beam hardening artifact from a total hip prosthetic.        .      Vitals:  /86 (Site: Left Upper Arm, Position: Sitting, Cuff Size: Large Adult)   Pulse 88   Ht 5' 1\" (1.549 m)   Wt 219 lb (99.3 kg)   BMI 41.38 kg/m²   Allergies:  Fenofibrate, Statins, and Pcn [penicillins]  Past Medical History:   Diagnosis Date    Abnormal ultrasound of breast     Abscess of abdominal wall 11/5/2016    Bilateral carpal tunnel syndrome 4/23/2018    Bleeding hemorrhoid 3/17/2015    Breast cancer, right ([address] 2003    s/p partial mastectomy, radiation    Carbuncle of abdominal wall 11/5/2016    Ductal carcinoma in situ of breast     right    Fibrocystic disease of right breast     Generalized osteoarthritis 8/4/2014    GERD (gastroesophageal reflux disease)     History of breast cancer in female 8/4/2014    HTN (hypertension) 8/4/2014    Hyperlipidemia     Hypertension     Hypothyroidism 12/2/2016    Insomnia 6/16/2015    Moderate persistent asthma 11/25/2020    Moderate persistent asthma 11/25/2020    MRSA (methicillin resistant Staphylococcus aureus) infection     MRSA infection 11/2016    LLQ abdominal wall    Obesity     Postmenopausal 2/9/2017    Pre-diabetes 7/23/2019    Scoliosis     Subclinical hypothyroidism 2014    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      Past Surgical History:   Procedure Laterality Date    ABSCESS DRAINAGE Left 2016    Abscess abdominal wall LLQ    BREAST BIOPSY Right 08/10/2017    DR Israel Negron    BREAST SURGERY N/A 2016    ABDOMINAL INCISION AND DRAINAGE performed by Nikole Biggs MD at [address] [place] [address]  04/15/2005    COLONOSCOPY  2009    DR Kenan Moody    COLONOSCOPY  2014    diverticulosis, polyps x 3 (DR HATCH)    DILATION AND CURETTAGE OF UTERUS      #1    DILATION AND CURETTAGE OF UTERUS      #2    HERNIA REPAIR      DR Jaci Willett HIP SURGERY Left 2018    JOINT REPLACEMENT Left     DR SANCHEZ, hip    MASTECTOMY, PARTIAL Right     DC REVISE MEDIAN N/CARPAL TUNNEL SURG Left 5/3/2018    LEFT WRIST CARPAL TUNNEL RELEASE performed by Cam Wolfe MD at [address] [place] [address] N/CARPAL TUNNEL SURG Right 2018    RIGHT WRIST CARPAL TUNNEL RELEASE performed by Cam Wolfe MD at [address] [facility]       OB History    No obstetric history on file.        Family History   Problem Relation Age of Onset    Heart Disease Father     Heart Attack Father     Diabetes Mother      Social History     Socioeconomic History    Marital status:      Spouse name: Arlin Melo Number of children: 2    Years of education: 12    Highest education level: Not on file   Occupational History    Occupation: Retired     Comment: former teacher   Tobacco Use    Smoking status: Former Smoker     Packs/day: 1.50     Years: 21.00     Pack years: 31.50     Types: Cigarettes     Start date:      Quit date: 1980     Years since quittin.8    Smokeless tobacco: Never Used   Substance and Sexual Activity    Alcohol use: Yes     Comment: infrequent    Drug use: No    Sexual activity: Never     Partners: Male     Comment: monogamous   Other Topics Concern    Not on file   Social History Narrative    Living with spouse     Social Determinants of Health     Financial Resource Strain: Low Risk     Difficulty of Paying Living Expenses: Not hard at all   Food Insecurity: No Food Insecurity    Worried About Running Out of Food in the Last Year: Never true    [place] of Food in the Last Year: Never true   Transportation Needs:     Lack of Transportation (Medical):  Lack of Transportation (Non-Medical):    Physical Activity:     Days of Exercise per Week:     Minutes of Exercise per Session:    Stress:     Feeling of Stress :    Social Connections:     Frequency of Communication with Friends and Family:     Frequency of Social Gatherings with Friends and Family:     Attends Buddhism Services:     Active Member of Clubs or Organizations:     Attends Club or Organization Meetings:     Marital Status:    Intimate Partner Violence:     Fear of Current or Ex-Partner:     Emotionally Abused:     Physically Abused:     Sexually Abused:        Contraceptive method:  none    Patient's medications, allergies, past medical, surgical, social and family histories were reviewed and updated as appropriate. Review of Systems  As per chief complaint   All other systems reviewed and are negative. Physical Exam:  Vitals:  /86 (Site: Left Upper Arm, Position: Sitting, Cuff Size: Large Adult)   Pulse 88   Ht 5' 1\" (1.549 m)   Wt 219 lb (99.3 kg)   BMI 41.38 kg/m²   Lungs: CTAB   Heart : Regular S1/S2, no M/R/G  Abdomen: Soft , NT, ND , + BS   Pelvic exam : deferred    Assessment:      Diagnosis Orders   1. Cyst of ovary, unspecified laterality         Plan:     Ca1 25 within normal  Ultrasound cannot visualize ovaries, but CT scan results reviewed again with patient showing left ovarian cyst more than 4 cm in greatest dimension. Decision for laparoscopic removal    Counseling: The patient was counseled on all options both medical and surgical, conservative as well as definitive.  She has elected to proceed with the procedure as stated above. The patient was counseled on the procedure. Risks and complications were reviewed in detail. The patients orders, labs, consents have been completed. The history and physical as well as all supporting surgical documentation will be forwarded to the pre-operative holding area. The patient is aware that this procedure may not alleviate her symptoms. That there may be a necessity for a second surgery and that there may be an incomplete removal of abnormal tissue. Discussed all risks and benefits of procedure in detail including risks of anesthesia, blood loss, need for transfusion, infection;  also potential for complication, injury, need for repair and/or removal of uterus, tubes, ovaries, bowel, bladder, ureters, blood vessels and nerves. Also discussed pre-operative and post-operative expectations. Patient verbalizes understanding. Patient was seen with total face to face time of 30 minutes. More than 50% of this visit was counseling and education regarding The encounter diagnosis was Cyst of ovary, unspecified laterality. and Follow-up (US done 10/19/21.)   as well as  counseling on preventative health maintenance follow-up. No orders of the defined types were placed in this encounter. No orders of the defined types were placed in this encounter. Follow Up:  Return for scheduled for surgery.         Crow Kowalski MD

## 2021-11-08 ENCOUNTER — OFFICE VISIT (OUTPATIENT)
Dept: ENDOCRINOLOGY | Age: 81
End: 2021-11-08
Payer: MEDICARE

## 2021-11-08 VITALS
BODY MASS INDEX: 40.97 KG/M2 | WEIGHT: 217 LBS | DIASTOLIC BLOOD PRESSURE: 80 MMHG | OXYGEN SATURATION: 91 % | SYSTOLIC BLOOD PRESSURE: 145 MMHG | HEART RATE: 91 BPM | HEIGHT: 61 IN

## 2021-11-08 DIAGNOSIS — E83.52 HYPERCALCEMIA: Primary | ICD-10-CM

## 2021-11-08 DIAGNOSIS — E55.9 VITAMIN D DEFICIENCY: ICD-10-CM

## 2021-11-08 PROCEDURE — 99203 OFFICE O/P NEW LOW 30 MIN: CPT | Performed by: INTERNAL MEDICINE

## 2021-11-08 NOTE — PROGRESS NOTES
11/8/2021    Assessment:       Diagnosis Orders   1. Hypercalcemia  Basic Metabolic Panel    Vitamin D 25 Hydroxy   2. Vitamin D deficiency       Hypercalcemia due to diuretics      PLAN:     Monitor calcium levels   F/u in 4 months   More than 50 % of 30 min spend in pt education counseling   Orders Placed This Encounter   Procedures    Basic Metabolic Panel     Standing Status:   Future     Standing Expiration Date:   11/8/2022    Vitamin D 25 Hydroxy     Standing Status:   Future     Standing Expiration Date:   11/8/2022         Subjective:     Chief Complaint   Patient presents with    New Patient     Hypercalcemia     Vitals:    11/08/21 1431 11/08/21 1433   BP: (!) 145/80 (!) 145/80   Pulse: 91    SpO2: 91%    Weight: 217 lb (98.4 kg)    Height: 5' 1\" (1.549 m)      Wt Readings from Last 3 Encounters:   11/08/21 217 lb (98.4 kg)   10/26/21 219 lb (99.3 kg)   10/26/21 220 lb (99.8 kg)     BP Readings from Last 3 Encounters:   11/08/21 (!) 145/80   10/26/21 138/86   10/26/21 132/78     Pr referred for hypercalcemia calium between 9-10.5 denies kidney stones     Other  This is a new (hypercalcemia) problem. The current episode started more than 1 year ago. The problem occurs intermittently. The problem has been waxing and waning. Associated symptoms include fatigue. Exacerbated by: diuretics. She has tried nothing for the symptoms. Results for Jaycee Mccarthy (MRN [id number]) as of 11/8/2021 14:36   Ref.  Range 7/13/2019 09:37 12/13/2019 12:30 10/15/2020 16:01 5/6/2021 11:07 8/3/2021 14:21   Calcium Latest Ref Range: 8.5 - 9.9 mg/dL 9.9 9.8 10.2 (H) 10.6 (H) 10.8 (H)       Past Medical History:   Diagnosis Date    Abnormal ultrasound of breast     Abscess of abdominal wall 11/5/2016    Bilateral carpal tunnel syndrome 4/23/2018    Bleeding hemorrhoid 3/17/2015    Breast cancer, right ([address] 2003    s/p partial mastectomy, radiation    Carbuncle of abdominal wall 11/5/2016    Ductal carcinoma in situ of breast     right    Fibrocystic disease of right breast     Generalized osteoarthritis 8/4/2014    GERD (gastroesophageal reflux disease)     History of breast cancer in female 8/4/2014    HTN (hypertension) 8/4/2014    Hyperlipidemia     Hypertension     Hypothyroidism 12/2/2016    Insomnia 6/16/2015    Moderate persistent asthma 11/25/2020    Moderate persistent asthma 11/25/2020    MRSA (methicillin resistant Staphylococcus aureus) infection     MRSA infection 11/2016    LLQ abdominal wall    Obesity     Postmenopausal 2/9/2017    Pre-diabetes 7/23/2019    Scoliosis     Subclinical hypothyroidism 5/6/2014    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      Past Surgical History:   Procedure Laterality Date    ABSCESS DRAINAGE Left 11/2016    Abscess abdominal wall LLQ    BREAST BIOPSY Right 08/10/2017    DR LUIS    BREAST SURGERY N/A 11/6/2016    ABDOMINAL INCISION AND DRAINAGE performed by Lou Coleman MD at [address] [place] [address]  04/15/2005    COLONOSCOPY  05/19/2009    DR Mira Stout    COLONOSCOPY  09/18/2014    diverticulosis, polyps x 3 (DR HATCH)    DILATION AND CURETTAGE OF UTERUS      #1    DILATION AND CURETTAGE OF UTERUS      #2    HERNIA REPAIR      DR Nino Yosef HIP SURGERY Left 01/02/2018    JOINT REPLACEMENT Left 2006    DR SANCHEZ, hip    MASTECTOMY, PARTIAL Right 2003    ME REVISE MEDIAN N/CARPAL TUNNEL SURG Left 5/3/2018    LEFT WRIST CARPAL TUNNEL RELEASE performed by Aishwarya Ordonez MD at [facility] N/CARPAL TUNNEL SURG Right 5/17/2018    RIGHT WRIST CARPAL TUNNEL RELEASE performed by Aishwarya Ordonez MD at [facility]       Social History     Socioeconomic History    Marital status:      Spouse name: Bri Osorio Number of children: 2    Years of education: 12    Highest education level: Not on file   Occupational History    Occupation: Retired     Comment: former teacher   Tobacco Use    Smoking status: Former Smoker     Packs/day: 1.50     Years: 21.00     Pack years: 31.50     Types: Cigarettes     Start date:      Quit date:      Years since quittin.8    Smokeless tobacco: Never Used   Substance and Sexual Activity    Alcohol use: Yes     Comment: infrequent    Drug use: No    Sexual activity: Never     Partners: Male     Comment: monogamous   Other Topics Concern    Not on file   Social History Narrative    Living with spouse     Social Determinants of Health     Financial Resource Strain: Low Risk     Difficulty of Paying Living Expenses: Not hard at all   Food Insecurity: No Food Insecurity    Worried About Running Out of Food in the Last Year: Never true    [address] in the Last Year: Never true   Transportation Needs:     Lack of Transportation (Medical): Not on file    Lack of Transportation (Non-Medical):  Not on file   Physical Activity:     Days of Exercise per Week: Not on file    Minutes of Exercise per Session: Not on file   Stress:     Feeling of Stress : Not on file   Social Connections:     Frequency of Communication with Friends and Family: Not on file    Frequency of Social Gatherings with Friends and Family: Not on file    Attends Rastafarian Services: Not on file    Active Member of [address] [place] [address] [employer] or Organizations: Not on file    Attends Club or Organization Meetings: Not on file    Marital Status: Not on file   Intimate Partner Violence:     Fear of Current or Ex-Partner: Not on file    Emotionally Abused: Not on file    Physically Abused: Not on file    Sexually Abused: Not on file   Housing Stability:     Unable to Pay for Housing in the Last Year: Not on file    Number of [place] in the Last Year: Not on file    Unstable Housing in the Last Year: Not on file     Family History   Problem Relation Age of Onset    Heart Disease Father     Heart Attack Father     Diabetes Mother      Allergies   Allergen Reactions    Fenofibrate Other (See Comments)     myalgias    Statins Other (See Comments)     myalgias    Pcn [Penicillins] Rash       Current Outpatient Medications:     albuterol sulfate HFA (PROVENTIL HFA) 108 (90 Base) MCG/ACT inhaler, Inhale 2 puffs into the lungs every 6 hours as needed for Wheezing or Shortness of Breath, Disp: 18 g, Rfl: 0    triamterene-hydroCHLOROthiazide (DYAZIDE) 37.5-25 MG per capsule, Take 1 capsule by mouth daily, Disp: 90 capsule, Rfl: 1    metoprolol succinate (TOPROL XL) 25 MG extended release tablet, Take 1 tablet by mouth daily, Disp: 90 tablet, Rfl: 1    levothyroxine (SYNTHROID) 50 MCG tablet, Take 1 tablet by mouth Daily, Disp: 90 tablet, Rfl: 1    ipratropium (ATROVENT) 0.06 % nasal spray, 2 sprays by Nasal route 3 times daily, Disp: 15 mL, Rfl: 5    Multiple Vitamins-Minerals (PRESERVISION AREDS 2 PO), Take by mouth, Disp: , Rfl:     ezetimibe (ZETIA) 10 MG tablet, Take 1 tablet by mouth daily, Disp: 90 tablet, Rfl: 1    fluticasone-salmeterol (ADVAIR DISKUS) 250-50 MCG/DOSE AEPB, Inhale 1 puff into the lungs every 12 hours, Disp: 180 each, Rfl: 1    Handicap Placard MISC, by Does not apply route DX: GENERALIZED OSTEOARTHRITIS (M15.9), 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 , Disp: , Rfl:   Lab Results   Component Value Date     05/06/2021    K 4.2 05/06/2021     05/06/2021    CO2 30 05/06/2021    BUN 19 05/06/2021    CREATININE 0.73 08/16/2021    GLUCOSE 102 (H) 05/06/2021    CALCIUM 10.8 (H) 08/03/2021    PROT 7.3 10/15/2020    LABALBU 4.0 10/15/2020    BILITOT 0.3 10/15/2020    ALKPHOS 85 10/15/2020    AST 20 10/15/2020    ALT 16 10/15/2020 LABGLOM >60.0 08/16/2021    GFRAA >60.0 08/16/2021    GLOB 3.3 10/15/2020     Lab Results   Component Value Date    WBC 7.4 08/03/2021    HGB 13.8 08/03/2021    HCT 42.6 08/03/2021    MCV 86.7 08/03/2021     08/03/2021     Lab Results   Component Value Date    LABA1C 5.8 04/30/2021    LABA1C 6.1 (H) 10/15/2020    LABA1C 5.9 07/17/2019     Lab Results   Component Value Date    HDL 45 05/06/2021    HDL 41 10/15/2020    HDL 42 07/13/2019    LDLCALC 119 05/06/2021    LDLCALC 112 10/15/2020    LDLCALC 128 07/13/2019    CHOL 210 (H) 05/06/2021    CHOL 211 (H) 10/15/2020    CHOL 204 (H) 07/13/2019    TRIG 230 (H) 05/06/2021    TRIG 288 (H) 10/15/2020    TRIG 170 (H) 07/13/2019     No results found for: TESTM  Lab Results   Component Value Date    TSH 3.660 05/06/2021    TSH 4.210 (H) 10/15/2020    TSH 1.800 12/13/2019    TSHREFLEX 5.420 (H) 04/28/2016    TSHREFLEX 4.240 (H) 09/29/2015    FT3 3.0 11/07/2014    FT3 2.6 01/14/2014    T4FREE 1.41 05/06/2021    T4FREE 1.40 10/15/2020    T4FREE 1.46 07/13/2019     Lab Results   Component Value Date    TPOABS <0.3 09/29/2015       Review of Systems   Constitutional: Positive for fatigue. Objective:   Physical Exam  Vitals reviewed. Constitutional:       General: She is not in acute distress. Appearance: Normal appearance. She is obese. HENT:      Head: Normocephalic and atraumatic. Hair is normal.      Right Ear: External ear normal.      Left Ear: External ear normal.      Nose: Nose normal.      Mouth/Throat:      Mouth: Mucous membranes are moist.   Eyes:      General: No scleral icterus. Right eye: No discharge. Left eye: No discharge. Extraocular Movements: Extraocular movements intact. Conjunctiva/sclera: Conjunctivae normal.   Neck:      Trachea: Trachea normal.   Cardiovascular:      Rate and Rhythm: Normal rate and regular rhythm. Heart sounds: Normal heart sounds.    Pulmonary:      Effort: Pulmonary effort is normal. Breath sounds: Normal breath sounds. No wheezing or rhonchi. Abdominal:      Palpations: Abdomen is soft. Musculoskeletal:         General: Normal range of motion. Cervical back: Normal range of motion and neck supple. Right lower leg: Edema present. Left lower leg: Edema present. Skin:     General: Skin is warm and dry. Neurological:      General: No focal deficit present. Mental Status: She is alert and oriented to person, place, and time.    Psychiatric:         Mood and Affect: Mood normal.         Behavior: Behavior normal.

## 2021-12-08 ENCOUNTER — HOSPITAL ENCOUNTER (OUTPATIENT)
Dept: PREADMISSION TESTING | Age: 81
Discharge: HOME | End: 2021-12-12
Payer: MEDICARE

## 2021-12-08 VITALS
RESPIRATION RATE: 16 BRPM | OXYGEN SATURATION: 97 % | DIASTOLIC BLOOD PRESSURE: 77 MMHG | HEART RATE: 93 BPM | SYSTOLIC BLOOD PRESSURE: 147 MMHG | WEIGHT: 215 LBS | TEMPERATURE: 98 F | HEIGHT: 61 IN | BODY MASS INDEX: 40.59 KG/M2

## 2021-12-08 LAB
ABO/RH: NORMAL
ANION GAP SERPL CALCULATED.3IONS-SCNC: 10 MEQ/L (ref 9–15)
ANTIBODY SCREEN: NORMAL
BUN BLDV-MCNC: 17 MG/DL (ref 8–23)
CALCIUM SERPL-MCNC: 9.9 MG/DL (ref 8.5–9.9)
CHLORIDE BLD-SCNC: 97 MEQ/L (ref 95–107)
CO2: 30 MEQ/L (ref 20–31)
CREAT SERPL-MCNC: 0.63 MG/DL (ref 0.5–0.9)
EKG ATRIAL RATE: 76 BPM
EKG P AXIS: 60 DEGREES
EKG P-R INTERVAL: 168 MS
EKG Q-T INTERVAL: 366 MS
EKG QRS DURATION: 80 MS
EKG QTC CALCULATION (BAZETT): 411 MS
EKG R AXIS: 7 DEGREES
EKG T AXIS: 69 DEGREES
EKG VENTRICULAR RATE: 76 BPM
GFR AFRICAN AMERICAN: >60
GFR NON-AFRICAN AMERICAN: >60
GLUCOSE BLD-MCNC: 94 MG/DL (ref 70–99)
HCT VFR BLD CALC: 41.3 % (ref 37–47)
HEMOGLOBIN: 13.5 G/DL (ref 12–16)
MCH RBC QN AUTO: 28.2 PG (ref 27–31.3)
MCHC RBC AUTO-ENTMCNC: 32.6 % (ref 33–37)
MCV RBC AUTO: 86.5 FL (ref 82–100)
PDW BLD-RTO: 13.4 % (ref 11.5–14.5)
PLATELET # BLD: 274 K/UL (ref 130–400)
POTASSIUM SERPL-SCNC: 3.7 MEQ/L (ref 3.4–4.9)
RBC # BLD: 4.78 M/UL (ref 4.2–5.4)
SODIUM BLD-SCNC: 137 MEQ/L (ref 135–144)
WBC # BLD: 7 K/UL (ref 4.8–10.8)

## 2021-12-08 PROCEDURE — 85027 COMPLETE CBC AUTOMATED: CPT

## 2021-12-08 PROCEDURE — 86901 BLOOD TYPING SEROLOGIC RH(D): CPT

## 2021-12-08 PROCEDURE — 86900 BLOOD TYPING SEROLOGIC ABO: CPT

## 2021-12-08 PROCEDURE — 86850 RBC ANTIBODY SCREEN: CPT

## 2021-12-08 PROCEDURE — 93010 ELECTROCARDIOGRAM REPORT: CPT | Performed by: INTERNAL MEDICINE

## 2021-12-08 PROCEDURE — 93005 ELECTROCARDIOGRAM TRACING: CPT | Performed by: OBSTETRICS & GYNECOLOGY

## 2021-12-08 PROCEDURE — 80048 BASIC METABOLIC PNL TOTAL CA: CPT

## 2021-12-08 RX ORDER — CLINDAMYCIN PHOSPHATE 900 MG/50ML
900 INJECTION INTRAVENOUS
Status: CANCELLED | OUTPATIENT
Start: 2021-12-15 | End: 2021-12-15

## 2021-12-09 RX ORDER — SODIUM CHLORIDE 9 MG/ML
25 INJECTION, SOLUTION INTRAVENOUS PRN
Status: CANCELLED | OUTPATIENT
Start: 2021-12-09

## 2021-12-09 RX ORDER — LIDOCAINE HYDROCHLORIDE 10 MG/ML
1 INJECTION, SOLUTION EPIDURAL; INFILTRATION; INTRACAUDAL; PERINEURAL
Status: CANCELLED | OUTPATIENT
Start: 2021-12-09 | End: 2021-12-09

## 2021-12-09 RX ORDER — SODIUM CHLORIDE 0.9 % (FLUSH) 0.9 %
10 SYRINGE (ML) INJECTION PRN
Status: CANCELLED | OUTPATIENT
Start: 2021-12-09

## 2021-12-09 RX ORDER — SODIUM CHLORIDE, SODIUM LACTATE, POTASSIUM CHLORIDE, CALCIUM CHLORIDE 600; 310; 30; 20 MG/100ML; MG/100ML; MG/100ML; MG/100ML
INJECTION, SOLUTION INTRAVENOUS CONTINUOUS
Status: CANCELLED | OUTPATIENT
Start: 2021-12-09

## 2021-12-09 RX ORDER — SODIUM CHLORIDE 0.9 % (FLUSH) 0.9 %
10 SYRINGE (ML) INJECTION EVERY 12 HOURS SCHEDULED
Status: CANCELLED | OUTPATIENT
Start: 2021-12-09

## 2021-12-10 DIAGNOSIS — E78.5 HYPERLIPIDEMIA, UNSPECIFIED HYPERLIPIDEMIA TYPE: Chronic | ICD-10-CM

## 2021-12-10 NOTE — TELEPHONE ENCOUNTER
Pharmacy requesting medication refill.  Please approve or deny this request.    Rx requested:  Requested Prescriptions     Pending Prescriptions Disp Refills    ezetimibe (Lili Ash) 10 MG tablet [Pharmacy Med Name: EZETIMIBE 10 MG TABLET] 90 tablet 1     Sig: Take 1 tablet by mouth daily         Last Office Visit:   10/26/2021      Next Visit Date:  Future Appointments   Date Time Provider Ariadna Bright   1/3/2022  1:15 PM Verna Marina MD [address]   3/7/2022  1:30 PM Sukhjinder Malik  [address]   5/23/2022 11:20 AM Sharon Oneill MD [place] [id number]

## 2021-12-11 RX ORDER — EZETIMIBE 10 MG/1
10 TABLET ORAL DAILY
Qty: 90 TABLET | Refills: 1 | Status: SHIPPED | OUTPATIENT
Start: 2021-12-11 | End: 2022-05-23 | Stop reason: SDUPTHER

## 2021-12-15 ENCOUNTER — ANESTHESIA (OUTPATIENT)
Dept: OPERATING ROOM | Age: 81
End: 2021-12-15
Payer: MEDICARE

## 2021-12-15 ENCOUNTER — HOSPITAL ENCOUNTER (OUTPATIENT)
Age: 81
Setting detail: OUTPATIENT SURGERY
Discharge: HOME | End: 2021-12-15
Attending: OBSTETRICS & GYNECOLOGY | Admitting: OBSTETRICS & GYNECOLOGY
Payer: MEDICARE

## 2021-12-15 ENCOUNTER — ANESTHESIA EVENT (OUTPATIENT)
Dept: OPERATING ROOM | Age: 81
End: 2021-12-15
Payer: MEDICARE

## 2021-12-15 VITALS — OXYGEN SATURATION: 100 % | SYSTOLIC BLOOD PRESSURE: 115 MMHG | DIASTOLIC BLOOD PRESSURE: 61 MMHG | TEMPERATURE: 82 F

## 2021-12-15 VITALS
OXYGEN SATURATION: 96 % | BODY MASS INDEX: 40.59 KG/M2 | TEMPERATURE: 97.2 F | RESPIRATION RATE: 18 BRPM | DIASTOLIC BLOOD PRESSURE: 57 MMHG | HEIGHT: 61 IN | WEIGHT: 215 LBS | HEART RATE: 71 BPM | SYSTOLIC BLOOD PRESSURE: 116 MMHG

## 2021-12-15 DIAGNOSIS — G89.18 POSTOPERATIVE PAIN: Primary | ICD-10-CM

## 2021-12-15 PROCEDURE — 3600000014 HC SURGERY LEVEL 4 ADDTL 15MIN: Performed by: OBSTETRICS & GYNECOLOGY

## 2021-12-15 PROCEDURE — 7100000000 HC PACU RECOVERY - FIRST 15 MIN: Performed by: OBSTETRICS & GYNECOLOGY

## 2021-12-15 PROCEDURE — C1760 CLOSURE DEV, VASC: HCPCS | Performed by: OBSTETRICS & GYNECOLOGY

## 2021-12-15 PROCEDURE — 7100000011 HC PHASE II RECOVERY - ADDTL 15 MIN: Performed by: OBSTETRICS & GYNECOLOGY

## 2021-12-15 PROCEDURE — 7100000010 HC PHASE II RECOVERY - FIRST 15 MIN: Performed by: OBSTETRICS & GYNECOLOGY

## 2021-12-15 PROCEDURE — 58661 LAPAROSCOPY REMOVE ADNEXA: CPT | Performed by: OBSTETRICS & GYNECOLOGY

## 2021-12-15 PROCEDURE — 2580000003 HC RX 258: Performed by: OBSTETRICS & GYNECOLOGY

## 2021-12-15 PROCEDURE — 2709999900 HC NON-CHARGEABLE SUPPLY: Performed by: OBSTETRICS & GYNECOLOGY

## 2021-12-15 PROCEDURE — 2500000003 HC RX 250 WO HCPCS: Performed by: NURSE ANESTHETIST, CERTIFIED REGISTERED

## 2021-12-15 PROCEDURE — 2500000003 HC RX 250 WO HCPCS: Performed by: STUDENT IN AN ORGANIZED HEALTH CARE EDUCATION/TRAINING PROGRAM

## 2021-12-15 PROCEDURE — 3700000001 HC ADD 15 MINUTES (ANESTHESIA): Performed by: OBSTETRICS & GYNECOLOGY

## 2021-12-15 PROCEDURE — 2720000010 HC SURG SUPPLY STERILE: Performed by: OBSTETRICS & GYNECOLOGY

## 2021-12-15 PROCEDURE — 2500000003 HC RX 250 WO HCPCS: Performed by: NURSE PRACTITIONER

## 2021-12-15 PROCEDURE — 3700000000 HC ANESTHESIA ATTENDED CARE: Performed by: OBSTETRICS & GYNECOLOGY

## 2021-12-15 PROCEDURE — 88305 TISSUE EXAM BY PATHOLOGIST: CPT

## 2021-12-15 PROCEDURE — 6360000002 HC RX W HCPCS: Performed by: OBSTETRICS & GYNECOLOGY

## 2021-12-15 PROCEDURE — 3600000004 HC SURGERY LEVEL 4 BASE: Performed by: OBSTETRICS & GYNECOLOGY

## 2021-12-15 PROCEDURE — 2580000003 HC RX 258: Performed by: NURSE PRACTITIONER

## 2021-12-15 PROCEDURE — 6360000002 HC RX W HCPCS: Performed by: STUDENT IN AN ORGANIZED HEALTH CARE EDUCATION/TRAINING PROGRAM

## 2021-12-15 PROCEDURE — 2500000003 HC RX 250 WO HCPCS: Performed by: OBSTETRICS & GYNECOLOGY

## 2021-12-15 PROCEDURE — 7100000001 HC PACU RECOVERY - ADDTL 15 MIN: Performed by: OBSTETRICS & GYNECOLOGY

## 2021-12-15 PROCEDURE — 2580000003 HC RX 258: Performed by: NURSE ANESTHETIST, CERTIFIED REGISTERED

## 2021-12-15 PROCEDURE — 64488 TAP BLOCK BI INJECTION: CPT | Performed by: STUDENT IN AN ORGANIZED HEALTH CARE EDUCATION/TRAINING PROGRAM

## 2021-12-15 PROCEDURE — 6370000000 HC RX 637 (ALT 250 FOR IP): Performed by: NURSE ANESTHETIST, CERTIFIED REGISTERED

## 2021-12-15 PROCEDURE — 6360000002 HC RX W HCPCS: Performed by: NURSE ANESTHETIST, CERTIFIED REGISTERED

## 2021-12-15 RX ORDER — ROCURONIUM BROMIDE 10 MG/ML
INJECTION, SOLUTION INTRAVENOUS PRN
Status: DISCONTINUED | OUTPATIENT
Start: 2021-12-15 | End: 2021-12-15 | Stop reason: SDUPTHER

## 2021-12-15 RX ORDER — ONDANSETRON 2 MG/ML
4 INJECTION INTRAMUSCULAR; INTRAVENOUS EVERY 6 HOURS PRN
Status: DISCONTINUED | OUTPATIENT
Start: 2021-12-15 | End: 2021-12-15 | Stop reason: HOSPADM

## 2021-12-15 RX ORDER — OXYCODONE HYDROCHLORIDE AND ACETAMINOPHEN 5; 325 MG/1; MG/1
2 TABLET ORAL EVERY EVENING
Qty: 10 TABLET | Refills: 0 | Status: SHIPPED | OUTPATIENT
Start: 2021-12-15 | End: 2021-12-20

## 2021-12-15 RX ORDER — SODIUM CHLORIDE 0.9 % (FLUSH) 0.9 %
10 SYRINGE (ML) INJECTION PRN
Status: DISCONTINUED | OUTPATIENT
Start: 2021-12-15 | End: 2021-12-15 | Stop reason: HOSPADM

## 2021-12-15 RX ORDER — ACETAMINOPHEN 325 MG/1
650 TABLET ORAL EVERY 4 HOURS PRN
Status: DISCONTINUED | OUTPATIENT
Start: 2021-12-15 | End: 2021-12-15 | Stop reason: HOSPADM

## 2021-12-15 RX ORDER — KETOROLAC TROMETHAMINE 30 MG/ML
INJECTION, SOLUTION INTRAMUSCULAR; INTRAVENOUS PRN
Status: DISCONTINUED | OUTPATIENT
Start: 2021-12-15 | End: 2021-12-15 | Stop reason: SDUPTHER

## 2021-12-15 RX ORDER — ONDANSETRON 4 MG/1
4 TABLET, ORALLY DISINTEGRATING ORAL EVERY 8 HOURS PRN
Status: DISCONTINUED | OUTPATIENT
Start: 2021-12-15 | End: 2021-12-15 | Stop reason: HOSPADM

## 2021-12-15 RX ORDER — LIDOCAINE HYDROCHLORIDE 20 MG/ML
INJECTION, SOLUTION INTRAVENOUS PRN
Status: DISCONTINUED | OUTPATIENT
Start: 2021-12-15 | End: 2021-12-15 | Stop reason: SDUPTHER

## 2021-12-15 RX ORDER — SODIUM CHLORIDE, SODIUM LACTATE, POTASSIUM CHLORIDE, CALCIUM CHLORIDE 600; 310; 30; 20 MG/100ML; MG/100ML; MG/100ML; MG/100ML
INJECTION, SOLUTION INTRAVENOUS CONTINUOUS PRN
Status: DISCONTINUED | OUTPATIENT
Start: 2021-12-15 | End: 2021-12-15 | Stop reason: SDUPTHER

## 2021-12-15 RX ORDER — CLINDAMYCIN PHOSPHATE 900 MG/50ML
900 INJECTION INTRAVENOUS
Status: COMPLETED | OUTPATIENT
Start: 2021-12-15 | End: 2021-12-15

## 2021-12-15 RX ORDER — SODIUM CHLORIDE 9 MG/ML
25 INJECTION, SOLUTION INTRAVENOUS PRN
Status: DISCONTINUED | OUTPATIENT
Start: 2021-12-15 | End: 2021-12-15 | Stop reason: HOSPADM

## 2021-12-15 RX ORDER — MAGNESIUM HYDROXIDE 1200 MG/15ML
LIQUID ORAL CONTINUOUS PRN
Status: COMPLETED | OUTPATIENT
Start: 2021-12-15 | End: 2021-12-15

## 2021-12-15 RX ORDER — SODIUM CHLORIDE 0.9 % (FLUSH) 0.9 %
10 SYRINGE (ML) INJECTION EVERY 12 HOURS SCHEDULED
Status: DISCONTINUED | OUTPATIENT
Start: 2021-12-15 | End: 2021-12-15 | Stop reason: HOSPADM

## 2021-12-15 RX ORDER — DIPHENHYDRAMINE HYDROCHLORIDE 50 MG/ML
12.5 INJECTION INTRAMUSCULAR; INTRAVENOUS
Status: DISCONTINUED | OUTPATIENT
Start: 2021-12-15 | End: 2021-12-15 | Stop reason: HOSPADM

## 2021-12-15 RX ORDER — HYDROCODONE BITARTRATE AND ACETAMINOPHEN 5; 325 MG/1; MG/1
1 TABLET ORAL PRN
Status: DISCONTINUED | OUTPATIENT
Start: 2021-12-15 | End: 2021-12-15 | Stop reason: HOSPADM

## 2021-12-15 RX ORDER — DEXAMETHASONE SODIUM PHOSPHATE 10 MG/ML
INJECTION INTRAMUSCULAR; INTRAVENOUS PRN
Status: DISCONTINUED | OUTPATIENT
Start: 2021-12-15 | End: 2021-12-15 | Stop reason: SDUPTHER

## 2021-12-15 RX ORDER — PROPOFOL 10 MG/ML
INJECTION, EMULSION INTRAVENOUS PRN
Status: DISCONTINUED | OUTPATIENT
Start: 2021-12-15 | End: 2021-12-15 | Stop reason: SDUPTHER

## 2021-12-15 RX ORDER — DOCUSATE SODIUM 100 MG/1
100 CAPSULE, LIQUID FILLED ORAL 2 TIMES DAILY PRN
Qty: 60 CAPSULE | Refills: 2 | Status: SHIPPED | OUTPATIENT
Start: 2021-12-15

## 2021-12-15 RX ORDER — KETOROLAC TROMETHAMINE 30 MG/ML
30 INJECTION, SOLUTION INTRAMUSCULAR; INTRAVENOUS EVERY 6 HOURS
Status: DISCONTINUED | OUTPATIENT
Start: 2021-12-15 | End: 2021-12-15 | Stop reason: HOSPADM

## 2021-12-15 RX ORDER — SODIUM CHLORIDE, SODIUM LACTATE, POTASSIUM CHLORIDE, CALCIUM CHLORIDE 600; 310; 30; 20 MG/100ML; MG/100ML; MG/100ML; MG/100ML
INJECTION, SOLUTION INTRAVENOUS CONTINUOUS
Status: DISCONTINUED | OUTPATIENT
Start: 2021-12-15 | End: 2021-12-15 | Stop reason: HOSPADM

## 2021-12-15 RX ORDER — SODIUM CHLORIDE 0.9 % (FLUSH) 0.9 %
5-40 SYRINGE (ML) INJECTION EVERY 12 HOURS SCHEDULED
Status: DISCONTINUED | OUTPATIENT
Start: 2021-12-15 | End: 2021-12-15 | Stop reason: HOSPADM

## 2021-12-15 RX ORDER — OXYCODONE HYDROCHLORIDE AND ACETAMINOPHEN 5; 325 MG/1; MG/1
2 TABLET ORAL EVERY 4 HOURS PRN
Status: DISCONTINUED | OUTPATIENT
Start: 2021-12-15 | End: 2021-12-15 | Stop reason: HOSPADM

## 2021-12-15 RX ORDER — MEPERIDINE HYDROCHLORIDE 25 MG/ML
12.5 INJECTION INTRAMUSCULAR; INTRAVENOUS; SUBCUTANEOUS EVERY 5 MIN PRN
Status: DISCONTINUED | OUTPATIENT
Start: 2021-12-15 | End: 2021-12-15 | Stop reason: HOSPADM

## 2021-12-15 RX ORDER — ONDANSETRON 2 MG/ML
4 INJECTION INTRAMUSCULAR; INTRAVENOUS
Status: DISCONTINUED | OUTPATIENT
Start: 2021-12-15 | End: 2021-12-15 | Stop reason: HOSPADM

## 2021-12-15 RX ORDER — ONDANSETRON 2 MG/ML
INJECTION INTRAMUSCULAR; INTRAVENOUS PRN
Status: DISCONTINUED | OUTPATIENT
Start: 2021-12-15 | End: 2021-12-15 | Stop reason: SDUPTHER

## 2021-12-15 RX ORDER — IBUPROFEN 600 MG/1
600 TABLET ORAL EVERY 6 HOURS PRN
Qty: 60 TABLET | Refills: 1 | Status: SHIPPED | OUTPATIENT
Start: 2021-12-15 | End: 2022-05-23

## 2021-12-15 RX ORDER — ROPIVACAINE HYDROCHLORIDE 5 MG/ML
INJECTION, SOLUTION EPIDURAL; INFILTRATION; PERINEURAL
Status: COMPLETED | OUTPATIENT
Start: 2021-12-15 | End: 2021-12-15

## 2021-12-15 RX ORDER — FENTANYL CITRATE 50 UG/ML
50 INJECTION, SOLUTION INTRAMUSCULAR; INTRAVENOUS EVERY 10 MIN PRN
Status: DISCONTINUED | OUTPATIENT
Start: 2021-12-15 | End: 2021-12-15 | Stop reason: HOSPADM

## 2021-12-15 RX ORDER — SODIUM CHLORIDE 0.9 % (FLUSH) 0.9 %
5-40 SYRINGE (ML) INJECTION PRN
Status: DISCONTINUED | OUTPATIENT
Start: 2021-12-15 | End: 2021-12-15 | Stop reason: HOSPADM

## 2021-12-15 RX ORDER — ALBUTEROL SULFATE 90 UG/1
AEROSOL, METERED RESPIRATORY (INHALATION) PRN
Status: DISCONTINUED | OUTPATIENT
Start: 2021-12-15 | End: 2021-12-15 | Stop reason: SDUPTHER

## 2021-12-15 RX ORDER — FENTANYL CITRATE 50 UG/ML
INJECTION, SOLUTION INTRAMUSCULAR; INTRAVENOUS PRN
Status: DISCONTINUED | OUTPATIENT
Start: 2021-12-15 | End: 2021-12-15 | Stop reason: SDUPTHER

## 2021-12-15 RX ORDER — LIDOCAINE HYDROCHLORIDE 10 MG/ML
1 INJECTION, SOLUTION EPIDURAL; INFILTRATION; INTRACAUDAL; PERINEURAL
Status: DISCONTINUED | OUTPATIENT
Start: 2021-12-15 | End: 2021-12-15 | Stop reason: HOSPADM

## 2021-12-15 RX ORDER — METOCLOPRAMIDE HYDROCHLORIDE 5 MG/ML
10 INJECTION INTRAMUSCULAR; INTRAVENOUS
Status: DISCONTINUED | OUTPATIENT
Start: 2021-12-15 | End: 2021-12-15 | Stop reason: HOSPADM

## 2021-12-15 RX ORDER — HYDROCODONE BITARTRATE AND ACETAMINOPHEN 5; 325 MG/1; MG/1
2 TABLET ORAL PRN
Status: DISCONTINUED | OUTPATIENT
Start: 2021-12-15 | End: 2021-12-15 | Stop reason: HOSPADM

## 2021-12-15 RX ORDER — SIMETHICONE 80 MG
80 TABLET,CHEWABLE ORAL 4 TIMES DAILY PRN
Qty: 180 TABLET | Refills: 1 | Status: SHIPPED | OUTPATIENT
Start: 2021-12-15 | End: 2022-05-23

## 2021-12-15 RX ORDER — LIDOCAINE HYDROCHLORIDE AND EPINEPHRINE 10; 10 MG/ML; UG/ML
INJECTION, SOLUTION INFILTRATION; PERINEURAL PRN
Status: DISCONTINUED | OUTPATIENT
Start: 2021-12-15 | End: 2021-12-15 | Stop reason: SDUPTHER

## 2021-12-15 RX ORDER — LIDOCAINE HYDROCHLORIDE 10 MG/ML
1 INJECTION, SOLUTION EPIDURAL; INFILTRATION; INTRACAUDAL; PERINEURAL
Status: COMPLETED | OUTPATIENT
Start: 2021-12-15 | End: 2021-12-15

## 2021-12-15 RX ORDER — POLYETHYLENE GLYCOL 3350 17 G/17G
17 POWDER, FOR SOLUTION ORAL 2 TIMES DAILY PRN
Qty: 1020 G | Refills: 1 | Status: SHIPPED | OUTPATIENT
Start: 2021-12-15 | End: 2022-01-14

## 2021-12-15 RX ORDER — ACETAMINOPHEN 500 MG
1000 TABLET ORAL EVERY 6 HOURS PRN
Qty: 60 TABLET | Refills: 0 | Status: SHIPPED | OUTPATIENT
Start: 2021-12-15 | End: 2022-05-23

## 2021-12-15 RX ADMIN — ROCURONIUM BROMIDE 50 MG: 10 INJECTION INTRAVENOUS at 14:26

## 2021-12-15 RX ADMIN — PHENYLEPHRINE HYDROCHLORIDE 200 MCG: 10 INJECTION INTRAVENOUS at 14:57

## 2021-12-15 RX ADMIN — GENTAMICIN SULFATE 146.4 MG: 40 INJECTION, SOLUTION INTRAMUSCULAR; INTRAVENOUS at 14:40

## 2021-12-15 RX ADMIN — PHENYLEPHRINE HYDROCHLORIDE 200 MCG: 10 INJECTION INTRAVENOUS at 14:49

## 2021-12-15 RX ADMIN — DEXAMETHASONE SODIUM PHOSPHATE 5 MG: 10 INJECTION INTRAMUSCULAR; INTRAVENOUS at 14:32

## 2021-12-15 RX ADMIN — LIDOCAINE HYDROCHLORIDE AND EPINEPHRINE 10 ML: 10; 10 INJECTION, SOLUTION INFILTRATION; PERINEURAL at 14:35

## 2021-12-15 RX ADMIN — SUGAMMADEX 200 MG: 100 INJECTION, SOLUTION INTRAVENOUS at 15:41

## 2021-12-15 RX ADMIN — ROPIVACAINE HYDROCHLORIDE 30 ML: 5 INJECTION, SOLUTION EPIDURAL; INFILTRATION; PERINEURAL at 14:33

## 2021-12-15 RX ADMIN — FENTANYL CITRATE 50 MCG: 50 INJECTION INTRAMUSCULAR; INTRAVENOUS at 16:11

## 2021-12-15 RX ADMIN — KETOROLAC TROMETHAMINE 15 MG: 30 INJECTION, SOLUTION INTRAMUSCULAR; INTRAVENOUS at 15:38

## 2021-12-15 RX ADMIN — SODIUM CHLORIDE, POTASSIUM CHLORIDE, SODIUM LACTATE AND CALCIUM CHLORIDE 1000 ML: 600; 310; 30; 20 INJECTION, SOLUTION INTRAVENOUS at 10:50

## 2021-12-15 RX ADMIN — FENTANYL CITRATE 50 MCG: 50 INJECTION, SOLUTION INTRAMUSCULAR; INTRAVENOUS at 14:20

## 2021-12-15 RX ADMIN — LIDOCAINE HYDROCHLORIDE 100 MG: 20 INJECTION, SOLUTION INTRAVENOUS at 14:26

## 2021-12-15 RX ADMIN — PHENYLEPHRINE HYDROCHLORIDE 200 MCG: 10 INJECTION INTRAVENOUS at 14:55

## 2021-12-15 RX ADMIN — PHENYLEPHRINE HYDROCHLORIDE 100 MCG: 10 INJECTION INTRAVENOUS at 15:00

## 2021-12-15 RX ADMIN — ALBUTEROL SULFATE 4 PUFF: 90 AEROSOL, METERED RESPIRATORY (INHALATION) at 15:36

## 2021-12-15 RX ADMIN — ONDANSETRON 4 MG: 2 INJECTION INTRAMUSCULAR; INTRAVENOUS at 15:31

## 2021-12-15 RX ADMIN — SODIUM CHLORIDE, POTASSIUM CHLORIDE, SODIUM LACTATE AND CALCIUM CHLORIDE: 600; 310; 30; 20 INJECTION, SOLUTION INTRAVENOUS at 14:20

## 2021-12-15 RX ADMIN — LIDOCAINE HYDROCHLORIDE 0.1 ML: 10 INJECTION, SOLUTION EPIDURAL; INFILTRATION; INTRACAUDAL; PERINEURAL at 10:50

## 2021-12-15 RX ADMIN — FENTANYL CITRATE 50 MCG: 50 INJECTION, SOLUTION INTRAMUSCULAR; INTRAVENOUS at 15:50

## 2021-12-15 RX ADMIN — SODIUM CHLORIDE, POTASSIUM CHLORIDE, SODIUM LACTATE AND CALCIUM CHLORIDE: 600; 310; 30; 20 INJECTION, SOLUTION INTRAVENOUS at 15:10

## 2021-12-15 RX ADMIN — PROPOFOL 180 MG: 10 INJECTION, EMULSION INTRAVENOUS at 14:26

## 2021-12-15 RX ADMIN — CLINDAMYCIN PHOSPHATE 900 MG: 900 INJECTION, SOLUTION INTRAVENOUS at 14:27

## 2021-12-15 RX ADMIN — PHENYLEPHRINE HYDROCHLORIDE 100 MCG: 10 INJECTION INTRAVENOUS at 14:51

## 2021-12-15 ASSESSMENT — PULMONARY FUNCTION TESTS
PIF_VALUE: 0
PIF_VALUE: 27
PIF_VALUE: 27
PIF_VALUE: 25
PIF_VALUE: 35
PIF_VALUE: 26
PIF_VALUE: 38
PIF_VALUE: 19
PIF_VALUE: 25
PIF_VALUE: 33
PIF_VALUE: 17
PIF_VALUE: 4
PIF_VALUE: 34
PIF_VALUE: 0
PIF_VALUE: 36
PIF_VALUE: 36
PIF_VALUE: 16
PIF_VALUE: 25
PIF_VALUE: 35
PIF_VALUE: 1
PIF_VALUE: 19
PIF_VALUE: 28
PIF_VALUE: 24
PIF_VALUE: 29
PIF_VALUE: 28
PIF_VALUE: 26
PIF_VALUE: 19
PIF_VALUE: 24
PIF_VALUE: 38
PIF_VALUE: 38
PIF_VALUE: 2
PIF_VALUE: 26
PIF_VALUE: 34
PIF_VALUE: 25
PIF_VALUE: 0
PIF_VALUE: 29
PIF_VALUE: 26
PIF_VALUE: 21
PIF_VALUE: 35
PIF_VALUE: 26
PIF_VALUE: 38
PIF_VALUE: 36
PIF_VALUE: 38
PIF_VALUE: 27
PIF_VALUE: 21
PIF_VALUE: 32
PIF_VALUE: 35
PIF_VALUE: 30
PIF_VALUE: 32
PIF_VALUE: 20
PIF_VALUE: 37
PIF_VALUE: 24
PIF_VALUE: 27
PIF_VALUE: 36
PIF_VALUE: 26
PIF_VALUE: 24
PIF_VALUE: 25
PIF_VALUE: 38
PIF_VALUE: 24
PIF_VALUE: 29
PIF_VALUE: 38
PIF_VALUE: 26
PIF_VALUE: 38
PIF_VALUE: 26
PIF_VALUE: 36
PIF_VALUE: 35
PIF_VALUE: 26
PIF_VALUE: 30
PIF_VALUE: 9
PIF_VALUE: 21
PIF_VALUE: 38
PIF_VALUE: 35
PIF_VALUE: 35
PIF_VALUE: 38
PIF_VALUE: 12
PIF_VALUE: 29
PIF_VALUE: 24
PIF_VALUE: 38
PIF_VALUE: 28
PIF_VALUE: 26
PIF_VALUE: 35
PIF_VALUE: 26
PIF_VALUE: 30
PIF_VALUE: 25

## 2021-12-15 ASSESSMENT — PAIN DESCRIPTION - PROGRESSION
CLINICAL_PROGRESSION: GRADUALLY IMPROVING
CLINICAL_PROGRESSION: GRADUALLY IMPROVING

## 2021-12-15 ASSESSMENT — PAIN SCALES - GENERAL
PAINLEVEL_OUTOF10: 5
PAINLEVEL_OUTOF10: 6
PAINLEVEL_OUTOF10: 7

## 2021-12-15 ASSESSMENT — PAIN DESCRIPTION - LOCATION: LOCATION: ABDOMEN

## 2021-12-15 ASSESSMENT — PAIN DESCRIPTION - PAIN TYPE: TYPE: SURGICAL PAIN

## 2021-12-15 NOTE — ANESTHESIA PROCEDURE NOTES
Peripheral Block    Patient location during procedure: OR  Start time: 12/15/2021 2:28 PM  End time: 12/15/2021 2:34 PM  Staffing  Performed: anesthesiologist   Anesthesiologist: Aden Chatman DO  Preanesthetic Checklist  Completed: patient identified, IV checked, site marked, risks and benefits discussed, surgical consent, monitors and equipment checked, pre-op evaluation, timeout performed, anesthesia consent given, oxygen available and patient being monitored  Peripheral Block  Patient position: supine  Prep: ChloraPrep  Patient monitoring: cardiac monitor, continuous pulse ox, frequent blood pressure checks and IV access  Block type: TAP  Laterality: bilateral  Injection technique: single-shot  Guidance: ultrasound guided  Local infiltration: ropivacaine  Infiltration strength: 0.5 %  Dose: 30 mL  Provider prep: mask and sterile gloves (Sterile probe cover)  Local infiltration: ropivacaine  Needle  Needle type: combined needle/nerve stimulator   Needle gauge: 22 G  Needle length: 10 cm  Needle localization: anatomical landmarks and ultrasound guidance  Assessment  Injection assessment: negative aspiration for heme, no paresthesia on injection and local visualized surrounding nerve on ultrasound  Paresthesia pain: immediately resolved  Slow fractionated injection: yes  Hemodynamics: stable  Additional Notes  Ultrasound image printed and saved in patient chart.     Sterile probe cover used    Ropivacaine 0.5% 30 ml + lidocaine 1% with epi 1:100 k 10 ml    20 ml bilateral  Medications Administered  Ropivacaine (NAROPIN) injection 0.5%, 30 mL  Reason for block: post-op pain management and at surgeon's request

## 2021-12-15 NOTE — H&P
Neville Murdock is a 80 y.o. female who presents here today for complaints of Follow-up (US done 10/19/21.)    US result:   SIGNIFICANTLY COMPROMISED PELVIC ULTRASOUND. THE LOW-ATTENUATION LESION IN THE LEFT ADNEXA DEMONSTRATED ON CT CANNOT BE IMAGED ON THE ULTRASOUND EXAMINATION AND IS NOT FURTHER CHARACTERIZED. THE UTERUS AND ENDOMETRIAL ECHO COMPLEX ARE ALSO    NOT ADEQUATELY EVALUATED. NEITHER OVARY IS IMAGED.       Previous CT result : In the left hemipelvis a cystic structure seen that measures 4.9 x 4.8 cm. In the right hemipelvis a 1.7 x 2.2 cm hypodense structure    is seen. These are thought to be the patient's ovaries.  The uterus is partially visualized but is limited due to beam hardening artifact from a total hip prosthetic.         .        Vitals:  /86 (Site: Left Upper Arm, Position: Sitting, Cuff Size: Large Adult)   Pulse 88   Ht 5' 1\" (1.549 m)   Wt 219 lb (99.3 kg)   BMI 41.38 kg/m²   Allergies:  Fenofibrate, Statins, and Pcn [penicillins]  Past Medical History        Past Medical History:   Diagnosis Date    Abnormal ultrasound of breast      Abscess of abdominal wall 11/5/2016    Bilateral carpal tunnel syndrome 4/23/2018    Bleeding hemorrhoid 3/17/2015    Breast cancer, right ([address] 2003     s/p partial mastectomy, radiation    Carbuncle of abdominal wall 11/5/2016    Ductal carcinoma in situ of breast       right    Fibrocystic disease of right breast      Generalized osteoarthritis 8/4/2014    GERD (gastroesophageal reflux disease)      History of breast cancer in female 8/4/2014    HTN (hypertension) 8/4/2014    Hyperlipidemia      Hypertension      Hypothyroidism 12/2/2016    Insomnia 6/16/2015    Moderate persistent asthma 11/25/2020    Moderate persistent asthma 11/25/2020    MRSA (methicillin resistant Staphylococcus aureus) infection      MRSA infection 11/2016     LLQ abdominal wall    Obesity      Postmenopausal 2/9/2017    Pre-diabetes 7/23/2019  Scoliosis      Subclinical hypothyroidism 2014    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           Past Surgical History         Past Surgical History:   Procedure Laterality Date    ABSCESS DRAINAGE Left 2016     Abscess abdominal wall LLQ    BREAST BIOPSY Right 08/10/2017     DR Claudetta Breeze    BREAST SURGERY N/A 2016     ABDOMINAL INCISION AND DRAINAGE performed by Jb Barahona MD at [facility] COLONOSCOPY   04/15/2005    COLONOSCOPY   2009     DR Jeremiah Kulkarni    COLONOSCOPY   2014     diverticulosis, polyps x 3 (DR HATCH)    DILATION AND CURETTAGE OF UTERUS         #1    DILATION AND CURETTAGE OF UTERUS         #2    HERNIA REPAIR         DR Fidelina Valencia HIP SURGERY Left 2018    JOINT REPLACEMENT Left      DR SANCHEZ, hip    MASTECTOMY, PARTIAL Right     MT REVISE MEDIAN N/CARPAL TUNNEL SURG Left 5/3/2018     LEFT WRIST CARPAL TUNNEL RELEASE performed by Andrey Christian MD at [facility] N/CARPAL TUNNEL SURG Right 2018     RIGHT WRIST CARPAL TUNNEL RELEASE performed by Andrey Christian MD at [facility]             OB History    No obstetric history on file.         Family History         Family History   Problem Relation Age of Onset    Heart Disease Father      Heart Attack Father      Diabetes Mother           Social History               Socioeconomic History    Marital status:        Spouse name: Cuba Veliz Number of children: 2    Years of education: 12    Highest education level: Not on file   Occupational History    Occupation: Retired       Comment: former teacher   Tobacco Use    Smoking status: Former Smoker       Packs/day: 1.50       Years: 21.00       Pack years: 31.50       Types: Cigarettes       Start date: 65       Quit date: 36       Years since quittin.8    Smokeless tobacco: Never Used   Substance and Sexual Activity    Alcohol use:  Yes       Comment: infrequent    dimension. Decision for laparoscopic removal     Counseling: The patient was counseled on all options both medical and surgical, conservative as well as definitive. She has elected to proceed with the procedure as stated above.     The patient was counseled on the procedure. Risks and complications were reviewed in detail. The patients orders, labs, consents have been completed. The history and physical as well as all supporting surgical documentation will be forwarded to the pre-operative holding area.     The patient is aware that this procedure may not alleviate her symptoms. That there may be a necessity for a second surgery and that there may be an incomplete removal of abnormal tissue.     Discussed all risks and benefits of procedure in detail including risks of anesthesia, blood loss, need for transfusion, infection;  also potential for complication, injury, need for repair and/or removal of uterus, tubes, ovaries, bowel, bladder, ureters, blood vessels and nerves. Also discussed pre-operative and post-operative expectations.   Patient verbalizes understanding.         Danie Rivera MD

## 2021-12-15 NOTE — PROGRESS NOTES
CLINICAL PHARMACY NOTE: MEDS TO BEDS    Total # of Prescriptions Filled: 6   The following medications were delivered to the patient:  · Peg 3350   · Simethicone  · Docusate  · Oxycodone  · Ibu  · Acetaminophen    Additional Documentation:

## 2021-12-15 NOTE — BRIEF OP NOTE
Brief Postoperative Note      Patient: Demario Primus  YOB: 1940  MRN: [id number]    Date of Procedure: 12/15/2021    Pre-Op Diagnosis: LEFT OVARIAN CYST    Post-Op Diagnosis: Same       Procedure(s):  LAPAROSCOPIC LEFT OOPHORECTOMY AND LEFT ADEXNAL REMOVAL. Left adnexal mass removal .     Surgeon(s):  Hussein Patel MD    Assistant:  First Assistant: Izzy Perez    Anesthesia: General    Estimated Blood Loss (mL): Minimal    Complications: None    Specimens:   ID Type Source Tests Collected by Time Destination   A : #1: LEFT ovary and LEFT ovarian mass Tissue Ovary SURGICAL PATHOLOGY Hussein Patel MD 12/15/2021 1521        Implants:  * No implants in log *      Drains:   Urethral Catheter Temperature probe; Non-latex 16 fr (Active)       Findings: as above    Electronically signed by Hussein Patel MD on 12/15/2021 at 3:39 PM

## 2021-12-15 NOTE — FLOWSHEET NOTE
Discharge instruction given along with medications. VSS. [place], [place]. [facility] has scant draiange. IV out, patient being discharged.

## 2021-12-15 NOTE — ANESTHESIA PRE PROCEDURE
Department of Anesthesiology  Preprocedure Note       Name:  Rosanna Deleon   Age:  80 y.o.  :  1940                                          MRN:  [id number]         Date:  12/15/2021      Surgeon: Sophia Signs):  Bhargavi Cano MD    Procedure: Procedure(s):  LAPAROSCOPIC LEFT ADEXNAL REMOVAL. LATEX FREE    Medications prior to admission:   Prior to Admission medications    Medication Sig Start Date End Date Taking? Authorizing Provider   ezetimibe (ZETIA) 10 MG tablet Take 1 tablet by mouth daily 21  Yes Tirso Tristan MD   triamterene-hydroCHLOROthiazide (DYAZIDE) 37.5-25 MG per capsule Take 1 capsule by mouth daily 21  Yes Tirso Tristan MD   metoprolol succinate (TOPROL XL) 25 MG extended release tablet Take 1 tablet by mouth daily 21  Yes Tirso Tristan MD   levothyroxine (SYNTHROID) 50 MCG tablet Take 1 tablet by mouth Daily 21  Yes Tirso Tristan MD   ipratropium (ATROVENT) 0.06 % nasal spray 2 sprays by Nasal route 3 times daily 21  Yes Tirso Tristan MD   Handicap Placard MISC by Does not apply route DX: GENERALIZED OSTEOARTHRITIS (M15.9), HISTORY OF LEFT KNEE REPLACEMENT (S87.604)     EXPIRES: 10/2025 10/15/20  Yes Tirso Tristan MD   loperamide (IMODIUM) 2 MG capsule Take 2 mg by mouth 4 times daily as needed for Diarrhea    Yes Historical Provider, MD   acetaminophen (TYLENOL) 500 MG tablet Take 500 mg by mouth every 6 hours as needed for Pain   Yes Historical Provider, MD   Multiple Vitamin (MULTI-VITAMIN PO) Take 1 tablet by mouth daily.    Yes Historical Provider, MD   albuterol sulfate HFA (PROVENTIL HFA) 108 (90 Base) MCG/ACT inhaler Inhale 2 puffs into the lungs every 6 hours as needed for Wheezing or Shortness of Breath 10/26/21   Tirso Tristan MD   Multiple Vitamins-Minerals (PRESERVISION AREDS 2 PO) Take by mouth    Historical Provider, MD   fluticasone-salmeterol (ADVAIR DISKUS) 250-50 MCG/DOSE AEPB Inhale 1 puff into the lungs every 12 hours 2/5/21   Moises Carney MD       Current medications:    Current Facility-Administered Medications   Medication Dose Route Frequency Provider Last Rate Last Admin    0.9 % sodium chloride infusion  25 mL IntraVENous PRN Dex Gravel, APRN - CNP        lactated ringers infusion   IntraVENous Continuous Dex Gravel, APRN -  mL/hr at 12/15/21 1050 1,000 mL at 12/15/21 1050    sodium chloride flush 0.9 % injection 10 mL  10 mL IntraVENous 2 times per day Dex Gravel, APRN - CNP        sodium chloride flush 0.9 % injection 10 mL  10 mL IntraVENous PRN Dex Gravel, APRN - CNP        clindamycin (CLEOCIN) 900 mg in dextrose 5 % 50 mL IVPB  900 mg IntraVENous On Call to 3424 Yocasta Wagner MD        gentamicin (GARAMYCIN) 146.4 mg in dextrose 5 % 100 mL IVPB  1.5 mg/kg IntraVENous Once Nury Milton MD           Allergies:     Allergies   Allergen Reactions    Fenofibrate Other (See Comments)     myalgias    Statins Other (See Comments)     myalgias    Pcn [Penicillins] Rash       Problem List:    Patient Active Problem List   Diagnosis Code    Hyperlipidemia E78.5    GERD (gastroesophageal reflux disease) K21.9    Generalized osteoarthritis M15.9    HTN (hypertension) I10    History of breast cancer in female Z80.3    Insomnia G47.00    Constipation K59.00    Hypothyroidism E03.9    Postmenopausal Z78.0    Fibrocystic disease of right breast N60.11    Bilateral carpal tunnel syndrome G56.03    Pre-diabetes R73.03    Allergy status to penicillin Z88.0    Presence of left artificial knee joint Z96.652    Scoliosis, unspecified M41.9    Morbidly obese (HCC) E66.01    Moderate persistent asthma J45.40    Hypercalcemia E83.52       Past Medical History:        Diagnosis Date    Abnormal ultrasound of breast     Abscess of abdominal wall 11/5/2016    Bilateral carpal tunnel syndrome 4/23/2018    Bleeding hemorrhoid 3/17/2015    Breast cancer, right [facility]) 2003    s/p partial mastectomy, radiation    Carbuncle of abdominal wall 11/5/2016    Ductal carcinoma in situ of breast     right    Fibrocystic disease of right breast     Generalized osteoarthritis 8/4/2014    GERD (gastroesophageal reflux disease)     History of breast cancer in female 8/4/2014    HTN (hypertension) 8/4/2014    Hyperlipidemia     Hypertension     Hypothyroidism 12/2/2016    Insomnia 6/16/2015    Moderate persistent asthma 11/25/2020    Moderate persistent asthma 11/25/2020    MRSA (methicillin resistant Staphylococcus aureus) infection     MRSA infection 11/2016    LLQ abdominal wall    Obesity     Postmenopausal 2/9/2017    Pre-diabetes 7/23/2019    Scoliosis     Subclinical hypothyroidism 5/6/2014    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        Past Surgical History:        Procedure Laterality Date    ABSCESS DRAINAGE Left 11/2016    Abscess abdominal wall LLQ    BREAST BIOPSY Right 08/10/2017    DR LUIS    BREAST SURGERY N/A 11/06/2016    ABDOMINAL INCISION AND DRAINAGE performed by Vania Lennon MD at [address] [place] [address]  04/15/2005    COLONOSCOPY  05/19/2009    DR Alejandro Curiel    COLONOSCOPY  09/18/2014    diverticulosis, polyps x 3 (DR HATCH)    DILATION AND CURETTAGE OF UTERUS      #1    DILATION AND CURETTAGE OF UTERUS      #2    HERNIA REPAIR      DR Daisy Sargent HIP SURGERY Left 01/02/2018    JOINT REPLACEMENT Left 2006    DR SANCHEZ, hip    MASTECTOMY, PARTIAL Right 2003    SD REVISE MEDIAN N/CARPAL TUNNEL SURG Left 05/03/2018    LEFT WRIST CARPAL TUNNEL RELEASE performed by Rosamaria Fierro MD at [facility] N/CARPAL TUNNEL SURG Right 05/17/2018    RIGHT WRIST CARPAL TUNNEL RELEASE performed by Rosamaria Fierro MD at [address] [place] [address] Bilateral     UPPER GASTROINTESTINAL ENDOSCOPY         Social History:    Social History     Tobacco Use    Smoking status: Former Smoker     Packs/day: 1.50     Years: 21.00 Pack years: 31.50     Types: Cigarettes     Start date: 65     Quit date: 1980     Years since quittin.9    Smokeless tobacco: Never Used   Substance Use Topics    Alcohol use: Yes     Comment: infrequent                                Counseling given: Not Answered      Vital Signs (Current):   Vitals:    12/15/21 1036   BP: (!) 141/78   Pulse: 101   Resp: 24   Temp: 97.8 °F (36.6 °C)   TempSrc: Temporal   SpO2: 95%   Weight: 215 lb (97.5 kg)   Height: 5' 1\" (1.549 m)                                              BP Readings from Last 3 Encounters:   12/15/21 (!) 141/78   21 (!) 147/77   21 (!) 145/80       NPO Status: Time of last liquid consumption: 0000                        Time of last solid consumption: 0000                        Date of last liquid consumption: 12/15/21                        Date of last solid food consumption: 12/15/21    BMI:   Wt Readings from Last 3 Encounters:   12/15/21 215 lb (97.5 kg)   21 215 lb (97.5 kg)   21 217 lb (98.4 kg)     Body mass index is 40.62 kg/m². CBC:   Lab Results   Component Value Date    WBC 7.0 2021    RBC 4.78 2021    RBC 4.39 2011    HGB 13.5 2021    HCT 41.3 2021    MCV 86.5 2021    RDW 13.4 2021     2021       CMP:   Lab Results   Component Value Date     2021    K 3.7 2021    CL 97 2021    CO2 30 2021    BUN 17 2021    CREATININE 0.63 2021    GFRAA >60.0 2021    LABGLOM >60.0 2021    GLUCOSE 94 2021    GLUCOSE 96 2011    PROT 7.3 10/15/2020    CALCIUM 9.9 2021    BILITOT 0.3 10/15/2020    ALKPHOS 85 10/15/2020    AST 20 10/15/2020    ALT 16 10/15/2020       POC Tests: No results for input(s): POCGLU, POCNA, POCK, POCCL, POCBUN, POCHEMO, POCHCT in the last 72 hours. Coags:   Lab Results   Component Value Date    PROTIME 10.6 10/02/2018    INR 0.96 10/02/2018       HCG (If Applicable):  No results found for: PREGTESTUR, PREGSERUM, HCG, HCGQUANT     ABGs: No results found for: PHART, PO2ART, RKU8EVY, DHT7ECB, BEART, E8LACOHT     Type & Screen (If Applicable):  No results found for: LABABO, LABRH    Drug/Infectious Status (If Applicable):  No results found for: HIV, HEPCAB    COVID-19 Screening (If Applicable): No results found for: COVID19        Anesthesia Evaluation  Patient summary reviewed and Nursing notes reviewed no history of anesthetic complications:   Airway: Mallampati: II  TM distance: >3 FB   Neck ROM: full  Mouth opening: > = 3 FB Dental: normal exam         Pulmonary:normal exam    (+) asthma:                            Cardiovascular:  Exercise tolerance: good (>4 METS),   (+) hypertension:,       ECG reviewed               Beta Blocker:  Dose within 24 Hrs      ROS comment: Normal sinus rhythm  Minimal voltage criteria for LVH, may be normal variant  Borderline ECG  When compared with ECG of 04-NOV-2016 15:24,  Nonspecific T wave abnormality no longer evident in Inferior leads     Neuro/Psych:   (+) neuromuscular disease:,             GI/Hepatic/Renal:   (+) GERD:, morbid obesity         ROS comment: BMI 40. Endo/Other:    (+) hypothyroidism::., .          Pt had PAT visit. Abdominal:             Vascular: negative vascular ROS. Other Findings:             Anesthesia Plan      general and regional     ASA 3     (ETT  TAP)  Induction: intravenous. MIPS: Postoperative opioids intended and Prophylactic antiemetics administered. Anesthetic plan and risks discussed with patient. Plan discussed with CRNA.     Attending anesthesiologist reviewed and agrees with Fawad Jensen DO   12/15/2021

## 2021-12-18 NOTE — OP NOTE
Operative Note    Patient: Inge Garcia  YOB: 1940  MRN: [id number]     Date of Procedure: 12/15/2021     Pre-Op Diagnosis: LEFT OVARIAN CYST     Post-Op Diagnosis: Same       Procedure(s):  LAPAROSCOPIC LEFT OOPHORECTOMY AND LEFT ADEXNAL REMOVAL. Left adnexal mass removal .      Surgeon(s):  Homar Munguia MD     Assistant:  First Assistant: Jesse Perez     Anesthesia: General     Estimated Blood Loss (mL): Minimal     Complications: None     Specimens:   ID Type Source Tests Collected by Time Destination   A : #1: LEFT ovary and LEFT ovarian mass Tissue Ovary SURGICAL PATHOLOGY Homar Munguia MD 12/15/2021 1521           Implants:  * No implants in log *      Drains:   Urethral Catheter Temperature probe; Non-latex 16 fr (Active)         Findings: as above     Operative technique    Patient taken to the operating room and after adequate general anesthesia has been established she was placed in lithotomy position using yellowfin stirrups. Patient was then scrubbed and draped in usual manner for laparoscopic procedure. Attention was then towards the abdomen Veress needle was inserted through the umbilicus and abdomen was inflated to 15 mmHg of CO2 gas after which a 5 mm incision was made in the subumbilical area through which a 5 mm trocar was inserted under direct visualization. After confirming intra-abdominal entry the patient was placed in steep Trendelenburg position 2 more trocar was placed. The left side 50 cm lateral to the midline and below the level of the umbilicus a 12 mm incision was made through which a 12 mm trocar was inserted. In the suprapubic area 2 cm above the symphysis pubis a 5 mm incision was made through which a 5 mm trocar was inserted.   Upon examination of the pelvis the mass consistent with ultrasound findings was seen on the left side, and the left tube was grasped and lifted infundibulopelvic ligament was exposed coagulated multiple times then transected using the harmonic Ace device through the mesosalpinx and the proximal end of the tube until the whole mass with the left ovary and tube completely  from the surrounding tissues. An Endobag was inserted through the 12 mm incision then the mass was placed in the Endobag the mass was then drained Endobag the bag was removed through the 12 mm incision site. The 12 mm fascial defect was approximated using 0 Vicryl using an Endo closure device. Gas was then emptied, skin was closed using 3-0 Monocryl in a subcuticular fashion with Dermabond. All instrument and sponge count was correct at the end of the procedure. Patient moved to recovery in stabel condition     Brenna Grey M.D., JAN G

## 2022-01-03 ENCOUNTER — OFFICE VISIT (OUTPATIENT)
Dept: OBGYN CLINIC | Age: 82
End: 2022-01-03

## 2022-01-03 VITALS
DIASTOLIC BLOOD PRESSURE: 78 MMHG | BODY MASS INDEX: 39.84 KG/M2 | WEIGHT: 211 LBS | HEART RATE: 104 BPM | HEIGHT: 61 IN | SYSTOLIC BLOOD PRESSURE: 138 MMHG

## 2022-01-03 DIAGNOSIS — Z09 POSTOPERATIVE EXAMINATION: Primary | ICD-10-CM

## 2022-01-03 DIAGNOSIS — E66.01 SEVERE OBESITY (BMI 35.0-35.9 WITH COMORBIDITY) (HCC): ICD-10-CM

## 2022-01-03 PROCEDURE — 99024 POSTOP FOLLOW-UP VISIT: CPT | Performed by: OBSTETRICS & GYNECOLOGY

## 2022-01-03 NOTE — PROGRESS NOTES
Postop Progress Note    Subjective    Gurinder Slater presents to the office for postop follow up. Objective    Vitals:    01/03/22 1321   BP: 138/78   Pulse: 104     General: alert, cooperative and no distress  Incision: healing well    Assessment  Doing well postoperatively. Plan  1. Continue any current medications  2. Wound care discussed  3. Pt is to increase activities as tolerated  4. Usual diet  5.  Follow up: as needed    Electronically signed by Gina Ruelas MD on 1/3/2022 at 1:41 PM

## 2022-01-15 ENCOUNTER — NURSE TRIAGE (OUTPATIENT)
Dept: OTHER | Facility: CLINIC | Age: 82
End: 2022-01-15

## 2022-01-15 NOTE — TELEPHONE ENCOUNTER
Received call from Angel mccann at [facility], caller not on line. Complaint: No Red Flags- just had questions regarding rash she is having    Market: [address] [facility] Name: [facility] telephone number was not verified by [facility]. Phone number on file is [phone]. Connected with caller via phone, please see below triage. Subjective: Caller states \"I've had a rash, this is like the third time. It's right under my stomach area. \" Patient states that PCP always prescribes a medication/salve and it goes away, but office is closed until Monday. Patient wanting to know what she can put on it. Current Symptoms: Rash under stomach-\"under skin flab\". Red in appearance, not raised, one big red splotch, not itchy, sore in crease of stomach/burns, denies other symptoms    Onset: 2 days ago; worsening    Associated Symptoms: NA    Pain Severity: 0/10; burning; constant    Temperature: Denies     What has been tried: Cortisone cream-not helping    LMP: NA Pregnant: NA    Recommended disposition: See PCP within 2 weeks. Patient not wanting to schedule appointment, states nurse from PCP office will call in RX for her on Monday. Patient states she can wait until then. Writer did advise patient to be seen at [address], [facility] or ED if rash worsens or wants to be seen before Monday. Patient agreeable. Care advice provided, patient verbalizes understanding; denies any other questions or concerns; instructed to call back for any new or worsening symptoms. Patient/caller to follow-up with PCP office on Monday for RX. Attention Provider: Thank you for allowing me to participate in the care of your patient. The patient was connected to triage in response to information provided to the ECC/PSC. Please do not respond through this encounter as the response is not directed to a shared pool.     Reason for Disposition   Red, moist, irritated area between skin folds (or under larger breasts)    Protocols used: RASH OR REDNESS - LOCALIZED-ADULT-AH

## 2022-01-17 DIAGNOSIS — R21 RASH AND OTHER NONSPECIFIC SKIN ERUPTION: ICD-10-CM

## 2022-01-17 NOTE — TELEPHONE ENCOUNTER
patient requesting medication refill. Please approve or deny this request.    Rx requested:  Requested Prescriptions     Pending Prescriptions Disp Refills    ketoconazole (NIZORAL) 2 % cream 60 g 1     Sig: Apply topically twice daily for 2 - 4 weeks (or for 1 week after lesions have healed).          Last Office Visit:   10/26/2021      Next Visit Date:  Future Appointments   Date Time Provider Ariadna Moraisti   3/7/2022  1:30 PM Venita Gosselin,  [address]   5/23/2022 11:20 AM Pradeep Villeda MD [facility]

## 2022-01-18 RX ORDER — KETOCONAZOLE 20 MG/G
CREAM TOPICAL
Qty: 60 G | Refills: 0 | Status: SHIPPED | OUTPATIENT
Start: 2022-01-18 | End: 2022-05-23

## 2022-01-20 NOTE — TELEPHONE ENCOUNTER
Patient got refill of ketoconazole cream and reports it is working well for her rash.  I advised walk in if no improvement or worsening rash

## 2022-03-14 ENCOUNTER — HOSPITAL ENCOUNTER (OUTPATIENT)
Dept: LAB | Age: 82
Discharge: HOME | End: 2022-03-14
Payer: MEDICARE

## 2022-03-14 DIAGNOSIS — E83.52 HYPERCALCEMIA: ICD-10-CM

## 2022-03-14 LAB
ANION GAP SERPL CALCULATED.3IONS-SCNC: 15 MEQ/L (ref 9–15)
BUN BLDV-MCNC: 18 MG/DL (ref 8–23)
CALCIUM SERPL-MCNC: 9.3 MG/DL (ref 8.5–9.9)
CHLORIDE BLD-SCNC: 98 MEQ/L (ref 95–107)
CO2: 25 MEQ/L (ref 20–31)
CREAT SERPL-MCNC: 0.65 MG/DL (ref 0.5–0.9)
GFR AFRICAN AMERICAN: >60
GFR NON-AFRICAN AMERICAN: >60
GLUCOSE BLD-MCNC: 88 MG/DL (ref 70–99)
POTASSIUM SERPL-SCNC: 3.6 MEQ/L (ref 3.4–4.9)
SODIUM BLD-SCNC: 138 MEQ/L (ref 135–144)

## 2022-03-14 PROCEDURE — 36415 COLL VENOUS BLD VENIPUNCTURE: CPT

## 2022-03-14 PROCEDURE — 80048 BASIC METABOLIC PNL TOTAL CA: CPT

## 2022-03-14 PROCEDURE — 82306 VITAMIN D 25 HYDROXY: CPT

## 2022-03-15 LAB — VITAMIN D 25-HYDROXY: 34.3 NG/ML

## 2022-03-21 ENCOUNTER — OFFICE VISIT (OUTPATIENT)
Dept: ENDOCRINOLOGY | Age: 82
End: 2022-03-21
Payer: MEDICARE

## 2022-03-21 VITALS
DIASTOLIC BLOOD PRESSURE: 70 MMHG | BODY MASS INDEX: 40.62 KG/M2 | WEIGHT: 215 LBS | OXYGEN SATURATION: 94 % | HEART RATE: 98 BPM | SYSTOLIC BLOOD PRESSURE: 133 MMHG

## 2022-03-21 DIAGNOSIS — E83.52 HYPERCALCEMIA: Primary | ICD-10-CM

## 2022-03-21 PROCEDURE — 99213 OFFICE O/P EST LOW 20 MIN: CPT | Performed by: INTERNAL MEDICINE

## 2022-03-21 NOTE — PROGRESS NOTES
3/21/2022    Assessment:       Diagnosis Orders   1. Hypercalcemia           PLAN:     Endocrine will sign off for now follow-up as needed  Increase water intake    Subjective:     Chief Complaint   Patient presents with    Other     Hypercalcemia      Vitals:    03/21/22 1344   BP: 133/70   Pulse: 98   SpO2: 94%   Weight: 215 lb (97.5 kg)     Wt Readings from Last 3 Encounters:   03/21/22 215 lb (97.5 kg)   01/03/22 211 lb (95.7 kg)   12/15/21 215 lb (97.5 kg)     BP Readings from Last 3 Encounters:   03/21/22 133/70   01/03/22 138/78   12/15/21 (!) 116/57     Follow-up on hypercalcemia probably due to diuretic history of vitamin D deficiency calcium levels were reviewed from recent labs compared from before  Last calcium was normal    Other  This is a chronic (Hypercalcemia) problem. The current episode started more than 1 year ago. The problem occurs intermittently. The problem has been gradually improving. Associated symptoms include fatigue. Exacerbated by: Diuretics. She has tried nothing for the symptoms. Results for Mihai Liu (MRN [id number]) as of 3/21/2022 13:51   Ref.  Range 12/8/2021 11:44 12/15/2021 13:14 3/14/2022 13:55   Sodium Latest Ref Range: 135 - 144 mEq/L 137  138   Potassium Latest Ref Range: 3.4 - 4.9 mEq/L 3.7  3.6   Chloride Latest Ref Range: 95 - 107 mEq/L 97  98   CO2 Latest Ref Range: 20 - 31 mEq/L 30  25   BUN,BUNPL Latest Ref Range: 8 - 23 mg/dL 17  18   Creatinine Latest Ref Range: 0.50 - 0.90 mg/dL 0.63  0.65   Anion Gap Latest Ref Range: 9 - 15 mEq/L 10  15   GFR Non- Latest Ref Range: >60  >60.0  >60.0   GFR African American Latest Ref Range: >60  >60.0  >60.0   GLUCOSE, FASTING,GF Latest Ref Range: 70 - 99 mg/dL 94  88   CALCIUM, SERUM, [id number] Latest Ref Range: 8.5 - 9.9 mg/dL 9.9  9.3   Vit D, 25-Hydroxy Latest Ref Range: >29.9 ng/mL   34.3       Past Medical History:   Diagnosis Date    Abnormal ultrasound of breast     Abscess of abdominal wall 11/5/2016    Bilateral carpal tunnel syndrome 4/23/2018    Bleeding hemorrhoid 3/17/2015    Breast cancer, right [facility]) 2003    s/p partial mastectomy, radiation    Carbuncle of abdominal wall 11/5/2016    Ductal carcinoma in situ of breast     right    Fibrocystic disease of right breast     Generalized osteoarthritis 8/4/2014    GERD (gastroesophageal reflux disease)     History of breast cancer in female 8/4/2014    HTN (hypertension) 8/4/2014    Hyperlipidemia     Hypertension     Hypothyroidism 12/2/2016    Insomnia 6/16/2015    Moderate persistent asthma 11/25/2020    Moderate persistent asthma 11/25/2020    MRSA (methicillin resistant Staphylococcus aureus) infection     MRSA infection 11/2016    LLQ abdominal wall    Obesity     Postmenopausal 2/9/2017    Pre-diabetes 7/23/2019    Scoliosis     Subclinical hypothyroidism 5/6/2014    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      Past Surgical History:   Procedure Laterality Date    ABSCESS DRAINAGE Left 11/2016    Abscess abdominal wall LLQ    BREAST BIOPSY Right 08/10/2017    DR LUIS    BREAST SURGERY N/A 11/06/2016    ABDOMINAL INCISION AND DRAINAGE performed by Zohra Mariano MD at [address] [place] [address]  04/15/2005    COLONOSCOPY  05/19/2009    DR Tye Cavanaugh    COLONOSCOPY  09/18/2014    diverticulosis, polyps x 3 (DR HATCH)    DILATION AND CURETTAGE OF UTERUS      #1    DILATION AND CURETTAGE OF UTERUS      #2    HERNIA REPAIR      DR Cristo Yang HIP SURGERY Left 01/02/2018    JOINT REPLACEMENT Left 2006    DR SANCHEZ, hip    LAPAROSCOPY Left 12/15/2021    LAPAROSCOPIC LEFT OOPHORECTOMY AND LEFT ADEXNAL REMOVAL.  performed by Yasmine Morales MD at [address], PARTIAL Right 2003    OVARY REMOVAL Left 12/15/2021    LAPAROSCOPIC OOPHORECTOMY AND LEFT ADEXNAL MASS REMOVAL ( [facility])    [place] REVISE MEDIAN N/CARPAL TUNNEL SURG Left 05/03/2018    LEFT WRIST CARPAL TUNNEL RELEASE performed by Miya Amaya MD at [facility] REVISE MEDIAN N/CARPAL TUNNEL SURG Right 2018    RIGHT WRIST CARPAL TUNNEL RELEASE performed by Daphne Murillo MD at [address] [facility] Bilateral     UPPER GASTROINTESTINAL ENDOSCOPY       Social History     Socioeconomic History    Marital status:      Spouse name: Mateus Diaz Number of children: 2    Years of education: 12    Highest education level: Not on file   Occupational History    Occupation: Retired     Comment: former teacher   Tobacco Use    Smoking status: Former Smoker     Packs/day: 1.50     Years: 21.00     Pack years: 31.50     Types: Cigarettes     Start date:      Quit date:      Years since quittin.2    Smokeless tobacco: Never Used   Substance and Sexual Activity    Alcohol use: Yes     Comment: infrequent    Drug use: No    Sexual activity: Never     Partners: Male     Comment: monogamous   Other Topics Concern    Not on file   Social History Narrative    Living with spouse     Social Determinants of Health     Financial Resource Strain: Low Risk     Difficulty of Paying Living Expenses: Not hard at all   Food Insecurity: No Food Insecurity    Worried About Running Out of Food in the Last Year: Never true    [address] in the Last Year: Never true   Transportation Needs:     Lack of Transportation (Medical): Not on file    Lack of Transportation (Non-Medical):  Not on file   Physical Activity:     Days of Exercise per Week: Not on file    Minutes of Exercise per Session: Not on file   Stress:     Feeling of Stress : Not on file   Social Connections:     Frequency of Communication with Friends and Family: Not on file    Frequency of Social Gatherings with Friends and Family: Not on file    Attends Restorationist Services: Not on file    Active Member of Clubs or Organizations: Not on file    Attends Club or Organization Meetings: Not on file    Marital Status: Not on file   Intimate Partner Violence:     Fear of Current or (ATROVENT) 0.06 % nasal spray, 2 sprays by Nasal route 3 times daily, Disp: 15 mL, Rfl: 5    Multiple Vitamins-Minerals (PRESERVISION AREDS 2 PO), Take by mouth, Disp: , Rfl:     fluticasone-salmeterol (ADVAIR DISKUS) 250-50 MCG/DOSE AEPB, Inhale 1 puff into the lungs every 12 hours, Disp: 180 each, Rfl: 1    Handicap Placard MISC, by Does not apply route DX: GENERALIZED OSTEOARTHRITIS (M15.9), HISTORY OF LEFT KNEE REPLACEMENT (J88.133)     EXPIRES: 10/2025, Disp: 1 each, Rfl: 0    loperamide (IMODIUM) 2 MG capsule, Take 2 mg by mouth 4 times daily as needed for Diarrhea , Disp: , Rfl:     acetaminophen (TYLENOL) 500 MG tablet, Take 500 mg by mouth every 6 hours as needed for Pain, Disp: , Rfl:     Multiple Vitamin (MULTI-VITAMIN PO), Take 1 tablet by mouth daily. , Disp: , Rfl:   Lab Results   Component Value Date     03/14/2022    K 3.6 03/14/2022    CL 98 03/14/2022    CO2 25 03/14/2022    BUN 18 03/14/2022    CREATININE 0.65 03/14/2022    GLUCOSE 88 03/14/2022    CALCIUM 9.3 03/14/2022    PROT 7.3 10/15/2020    LABALBU 4.0 10/15/2020    BILITOT 0.3 10/15/2020    ALKPHOS 85 10/15/2020    AST 20 10/15/2020    ALT 16 10/15/2020    LABGLOM >60.0 03/14/2022    GFRAA >60.0 03/14/2022    GLOB 3.3 10/15/2020     Lab Results   Component Value Date    WBC 7.0 12/08/2021    HGB 13.5 12/08/2021    HCT 41.3 12/08/2021    MCV 86.5 12/08/2021     12/08/2021     Lab Results   Component Value Date    LABA1C 5.8 04/30/2021    LABA1C 6.1 (H) 10/15/2020    LABA1C 5.9 07/17/2019     Lab Results   Component Value Date    HDL 45 05/06/2021    HDL 41 10/15/2020    HDL 42 07/13/2019    LDLCALC 119 05/06/2021    LDLCALC 112 10/15/2020    LDLCALC 128 07/13/2019    CHOL 210 (H) 05/06/2021    CHOL 211 (H) 10/15/2020    CHOL 204 (H) 07/13/2019    TRIG 230 (H) 05/06/2021    TRIG 288 (H) 10/15/2020    TRIG 170 (H) 07/13/2019     No results found for: TESTM  Lab Results   Component Value Date    TSH 3.660 05/06/2021    TSH 4.210 (H) 10/15/2020    TSH 1.800 12/13/2019    TSHREFLEX 5.420 (H) 04/28/2016    TSHREFLEX 4.240 (H) 09/29/2015    FT3 3.0 11/07/2014    FT3 2.6 01/14/2014    T4FREE 1.41 05/06/2021    T4FREE 1.40 10/15/2020    T4FREE 1.46 07/13/2019     Lab Results   Component Value Date    TPOABS <0.3 09/29/2015       Review of Systems   Constitutional: Positive for fatigue. Genitourinary: Negative. Objective:   Physical Exam  Vitals reviewed. Constitutional:       Appearance: Normal appearance. She is obese. HENT:      Head: Normocephalic and atraumatic. Right Ear: External ear normal.      Left Ear: External ear normal.      Nose: Nose normal.   Eyes:      General: No scleral icterus. Right eye: No discharge. Left eye: No discharge. Extraocular Movements: Extraocular movements intact. Conjunctiva/sclera: Conjunctivae normal.   Cardiovascular:      Rate and Rhythm: Normal rate. Musculoskeletal:         General: Normal range of motion. Cervical back: Normal range of motion and neck supple. Neurological:      General: No focal deficit present. Mental Status: She is alert.    Psychiatric:         Mood and Affect: Mood normal.         Behavior: Behavior normal.

## 2022-03-23 DIAGNOSIS — E03.9 HYPOTHYROIDISM, UNSPECIFIED TYPE: Chronic | ICD-10-CM

## 2022-03-23 DIAGNOSIS — I10 ESSENTIAL HYPERTENSION: Chronic | ICD-10-CM

## 2022-03-23 NOTE — TELEPHONE ENCOUNTER
Comments:     Last Office Visit (last PCP visit):   10/26/2021    Next Visit Date:  Future Appointments   Date Time Provider Ariadna Bright   5/23/2022 11:20 AM MD Girish [address]       **If hasn't been seen in over a year OR hasn't followed up according to last diabetes/ADHD visit, make appointment for patient before sending refill to provider.     Rx requested:  Requested Prescriptions     Pending Prescriptions Disp Refills    triamterene-hydroCHLOROthiazide (DYAZIDE) 37.5-25 MG per capsule [Pharmacy Med Name: TRIAMTERENE/HCTZ CAPS 37.5/25MG] 90 capsule 3     Sig: TAKE 1 CAPSULE DAILY    levothyroxine (SYNTHROID) 50 MCG tablet [Pharmacy Med Name: L-THYROXINE (SYNTHROID) TABS 50MCG] 90 tablet 3     Sig: TAKE 1 TABLET DAILY    metoprolol succinate (TOPROL XL) 25 MG extended release tablet [Pharmacy Med Name: METOPROLOL SUCCINATE ER TABS 25MG] 90 tablet 3     Sig: TAKE 1 TABLET DAILY

## 2022-03-24 RX ORDER — LEVOTHYROXINE SODIUM 0.05 MG/1
50 TABLET ORAL DAILY
Qty: 90 TABLET | Refills: 1 | Status: SHIPPED | OUTPATIENT
Start: 2022-03-24 | End: 2022-09-21

## 2022-03-24 RX ORDER — METOPROLOL SUCCINATE 25 MG/1
25 TABLET, EXTENDED RELEASE ORAL DAILY
Qty: 90 TABLET | Refills: 1 | Status: SHIPPED | OUTPATIENT
Start: 2022-03-24 | End: 2022-09-21

## 2022-03-24 RX ORDER — TRIAMTERENE AND HYDROCHLOROTHIAZIDE 37.5; 25 MG/1; MG/1
1 CAPSULE ORAL DAILY
Qty: 90 CAPSULE | Refills: 1 | Status: SHIPPED | OUTPATIENT
Start: 2022-03-24 | End: 2022-09-21

## 2022-05-23 ENCOUNTER — OFFICE VISIT (OUTPATIENT)
Dept: FAMILY MEDICINE CLINIC | Age: 82
End: 2022-05-23
Payer: MEDICARE

## 2022-05-23 VITALS
WEIGHT: 215 LBS | OXYGEN SATURATION: 94 % | BODY MASS INDEX: 42.21 KG/M2 | HEIGHT: 60 IN | SYSTOLIC BLOOD PRESSURE: 134 MMHG | DIASTOLIC BLOOD PRESSURE: 74 MMHG

## 2022-05-23 DIAGNOSIS — Z13.820 SCREENING FOR OSTEOPOROSIS: ICD-10-CM

## 2022-05-23 DIAGNOSIS — E83.52 HYPERCALCEMIA: ICD-10-CM

## 2022-05-23 DIAGNOSIS — R09.82 POST-NASAL DRAINAGE: ICD-10-CM

## 2022-05-23 DIAGNOSIS — I10 PRIMARY HYPERTENSION: Chronic | ICD-10-CM

## 2022-05-23 DIAGNOSIS — E78.2 MIXED HYPERLIPIDEMIA: Chronic | ICD-10-CM

## 2022-05-23 DIAGNOSIS — Z12.31 ENCOUNTER FOR SCREENING MAMMOGRAM FOR MALIGNANT NEOPLASM OF BREAST: ICD-10-CM

## 2022-05-23 DIAGNOSIS — E03.9 HYPOTHYROIDISM, UNSPECIFIED TYPE: Chronic | ICD-10-CM

## 2022-05-23 DIAGNOSIS — Z78.0 POSTMENOPAUSAL: ICD-10-CM

## 2022-05-23 DIAGNOSIS — R73.03 PRE-DIABETES: ICD-10-CM

## 2022-05-23 DIAGNOSIS — Z00.00 MEDICARE ANNUAL WELLNESS VISIT, SUBSEQUENT: Primary | ICD-10-CM

## 2022-05-23 DIAGNOSIS — E66.01 MORBIDLY OBESE (HCC): ICD-10-CM

## 2022-05-23 PROBLEM — Z86.010 HISTORY OF COLON POLYPS: Chronic | Status: ACTIVE | Noted: 2022-05-23

## 2022-05-23 PROBLEM — Z86.0100 HISTORY OF COLON POLYPS: Chronic | Status: ACTIVE | Noted: 2022-05-23

## 2022-05-23 PROCEDURE — G0439 PPPS, SUBSEQ VISIT: HCPCS | Performed by: FAMILY MEDICINE

## 2022-05-23 RX ORDER — IPRATROPIUM BROMIDE 42 UG/1
2 SPRAY, METERED NASAL 3 TIMES DAILY
Qty: 15 ML | Refills: 5 | Status: SHIPPED | OUTPATIENT
Start: 2022-05-23 | End: 2022-08-08 | Stop reason: SDUPTHER

## 2022-05-23 RX ORDER — EZETIMIBE 10 MG/1
10 TABLET ORAL DAILY
Qty: 90 TABLET | Refills: 1 | Status: SHIPPED | OUTPATIENT
Start: 2022-05-23

## 2022-05-23 ASSESSMENT — PATIENT HEALTH QUESTIONNAIRE - PHQ9
SUM OF ALL RESPONSES TO PHQ QUESTIONS 1-9: 0
1. LITTLE INTEREST OR PLEASURE IN DOING THINGS: 0
2. FEELING DOWN, DEPRESSED OR HOPELESS: 0
SUM OF ALL RESPONSES TO PHQ9 QUESTIONS 1 & 2: 0
SUM OF ALL RESPONSES TO PHQ QUESTIONS 1-9: 0

## 2022-05-23 ASSESSMENT — LIFESTYLE VARIABLES: HOW OFTEN DO YOU HAVE A DRINK CONTAINING ALCOHOL: NEVER

## 2022-05-23 NOTE — PROGRESS NOTES
Medicare Annual Wellness Visit    Bozena Amaro is here for Medicare AWV (pt c/o lack of sleep)    Assessment & Plan    1. Medicare annual wellness visit, subsequent  2. Encounter for screening mammogram for malignant neoplasm of breast  -     TALAT DIGITAL SCREEN W OR WO CAD BILATERAL; Future  3. Screening for osteoporosis  -     DEXA BONE DENSITY AXIAL SKELETON; Future  4. Postmenopausal  -     DEXA BONE DENSITY AXIAL SKELETON; Future  5. Morbidly obese ([address]  Assessment & Plan:  Patient counseled on healthy dietary choices and the benefits of a lower salt and/or lower carbohydrate diet as appropriate. Patient also counseled on benefits of moderate intensity cardiovascular exercise for 150 minutes per week as they are able. Advice was given to make small changes over time, setting smaller achievable goals until recommended lifestyle changes are reached. Orders:  -     TSH; Future  -     T4, Free; Future  6. Primary hypertension  -     CBC with Auto Differential; Future  -     Comprehensive Metabolic Panel; Future  7. Mixed hyperlipidemia  -     Comprehensive Metabolic Panel; Future  -     Lipid Panel; Future  -     ezetimibe (ZETIA) 10 MG tablet; Take 1 tablet by mouth daily, Disp-90 tablet, R-1Normal  8. Pre-diabetes  -     Comprehensive Metabolic Panel; Future  -     Hemoglobin A1C; Future  9. Hypothyroidism, unspecified type  -     TSH; Future  -     T4, Free; Future  10. Hypercalcemia  -     Comprehensive Metabolic Panel; Future  -     Vitamin D 25 Hydroxy; Future  11. Post-nasal drainage  -     ipratropium (ATROVENT) 0.06 % nasal spray; 2 sprays by Nasal route 3 times daily, Disp-15 mL, R-5Normal          Recommendations for Preventive Services Due: see orders and patient instructions/AVS.  Recommended screening schedule for the next 5-10 years is provided to the patient in written form: see Patient Instructions/AVS.     Return in about 6 months (around 11/23/2022) for Chronic Disease Check. Subjective       Patient's complete Health Risk Assessment and screening values have been reviewed and are found in Flowsheets. The following problems were reviewed today and where indicated follow up appointments were made and/or referrals ordered. Positive Risk Factor Screenings with Interventions:              Health Habits/Nutrition:     Physical Activity: Insufficiently Active    Days of Exercise per Week: 3 days    Minutes of Exercise per Session: 30 min     Have you lost any weight without trying in the past 3 months?: No  Body mass index: (!) 41.98  Have you seen the dentist within the past year?: Yes    Health Habits/Nutrition Interventions:  · Nutritional issues:  educational materials for healthy, well-balanced diet provided, educational materials to promote weight loss provided             Objective   Vitals:    05/23/22 1129   BP: 134/74   SpO2: 94%   Weight: 215 lb (97.5 kg)   Height: 5' (1.524 m)      Body mass index is 41.99 kg/m². Allergies   Allergen Reactions    Fenofibrate Other (See Comments)     myalgias    Statins Other (See Comments)     myalgias    Pcn [Penicillins] Rash     Prior to Visit Medications    Medication Sig Taking?  Authorizing Provider   Glycerin-Polysorbate 80 (REFRESH DRY EYE THERAPY OP) Apply to eye Yes Historical Provider, MD   ezetimibe (ZETIA) 10 MG tablet Take 1 tablet by mouth daily Yes Anam Peralta MD   ipratropium (ATROVENT) 0.06 % nasal spray 2 sprays by Nasal route 3 times daily Yes Anam Peralta MD   triamterene-hydroCHLOROthiazide (DYAZIDE) 37.5-25 MG per capsule Take 1 capsule by mouth daily Yes nAam Peralta MD   levothyroxine (SYNTHROID) 50 MCG tablet Take 1 tablet by mouth Daily Yes Anam Peralta MD   metoprolol succinate (TOPROL XL) 25 MG extended release tablet Take 1 tablet by mouth daily Yes Anam Peralta MD   docusate sodium (COLACE) 100 MG capsule Take 1 capsule by mouth 2 times daily as needed for Constipation Yes Doyle Gabriel MD   Multiple Vitamins-Minerals (PRESERVISION AREDS 2 PO) Take by mouth Yes Historical Provider, MD   Scooter Young [address] [facility] by Does not apply route DX: GENERALIZED OSTEOARTHRITIS (M15.9), HISTORY OF LEFT KNEE REPLACEMENT (X43.125)     EXPIRES: 10/2025 Yes Kalina Babin MD   loperamide (IMODIUM) 2 MG capsule Take 2 mg by mouth 4 times daily as needed for Diarrhea  Yes Historical Provider, MD   acetaminophen (TYLENOL) 500 MG tablet Take 500 mg by mouth every 6 hours as needed for Pain Yes Historical Provider, MD   Multiple Vitamin (MULTI-VITAMIN PO) Take 1 tablet by mouth daily.  Yes Historical Provider, MD [place] (Including outside providers/suppliers regularly involved in providing care):   Patient Care Team:  Kalina Babin MD as PCP - General (Family Medicine)  Kalina Babin MD as PCP - [address] [place] [address] Provider  Katalina Medina MD as Surgeon (Orthopedic Surgery)  Yany Monson MD as Surgeon (General Surgery)  Lupe Gaona MD as Consulting Physician (Hematology and Oncology)  Forrest Ramirez MD as Consulting Physician (Pain Medicine)  Zoey Rucker MD as Consulting Physician (Gastroenterology)     Reviewed and updated this visit:  Tobacco  Allergies  Meds  Problems  Med Hx  Surg Hx  Soc Hx  Fam Hx

## 2022-05-23 NOTE — ASSESSMENT & PLAN NOTE
Patient counseled on healthy dietary choices and the benefits of a lower salt and/or lower carbohydrate diet as appropriate. Patient also counseled on benefits of moderate intensity cardiovascular exercise for 150 minutes per week as they are able. Advice was given to make small changes over time, setting smaller achievable goals until recommended lifestyle changes are reached.

## 2022-05-23 NOTE — PATIENT INSTRUCTIONS
Personalized Preventive Plan for Alek Mccurdy - 5/23/2022  Medicare offers a range of preventive health benefits. Some of the tests and screenings are paid in full while other may be subject to a deductible, co-insurance, and/or copay. Some of these benefits include a comprehensive review of your medical history including lifestyle, illnesses that may run in your family, and various assessments and screenings as appropriate. After reviewing your medical record and screening and assessments performed today your provider may have ordered immunizations, labs, imaging, and/or referrals for you. A list of these orders (if applicable) as well as your Preventive Care list are included within your After Visit Summary for your review. Other Preventive Recommendations:    · A preventive eye exam performed by an eye specialist is recommended every 1-2 years to screen for glaucoma; cataracts, macular degeneration, and other eye disorders. · A preventive dental visit is recommended every 6 months. · Try to get at least 150 minutes of exercise per week or 10,000 steps per day on a pedometer . · Order or download the FREE \"Exercise & Physical Activity: Your Everyday Guide\" from [employer]. Call 7-669.755.9424 or search [employer] online. · You need 6528-8423 mg of calcium and 8978-7514 IU of vitamin D per day. It is possible to meet your calcium requirement with diet alone, but a vitamin D supplement is usually necessary to meet this goal.  · When exposed to the sun, use a sunscreen that protects against both UVA and UVB radiation with an SPF of 30 or greater. Reapply every 2 to 3 hours or after sweating, drying off with a towel, or swimming. · Always wear a seat belt when traveling in a car. Always wear a helmet when riding a bicycle or motorcycle. Heart-Healthy Diet   Sodium, Fat, and Cholesterol Controlled Diet       What Is a Heart Healthy Diet?    A heart-healthy diet is one that limits sodium , certain types of fat , and cholesterol . This type of diet is recommended for:   People with any form of cardiovascular disease (eg, coronary heart disease , peripheral vascular disease , previous heart attack , previous stroke )   People with risk factors for cardiovascular disease, such as high blood pressure , high cholesterol , or diabetes   Anyone who wants to lower their risk of developing cardiovascular disease   Sodium    Sodium is a mineral found in many foods. In general, most people consume much more sodium than they need. Diets high in sodium can increase blood pressure and lead to edema (water retention). On a heart-healthy diet, you should consume no more than 2,300 mg (milligrams) of sodium per dayabout the amount in one teaspoon of table salt. The foods highest in sodium include table salt (about 50% sodium), processed foods, convenience foods, and preserved foods. Cholesterol    Cholesterol is a fat-like, waxy substance in your blood. Our bodies make some cholesterol. It is also found in animal products, with the highest amounts in fatty meat, egg yolks, whole milk, cheese, shellfish, and organ meats. On a heart-healthy diet, you should limit your cholesterol intake to less than 200 mg per day. It is normal and important to have some cholesterol in your bloodstream. But too much cholesterol can cause plaque to build up within your arteries, which can eventually lead to a heart attack or stroke. The two types of cholesterol that are most commonly referred to are:   Low-density lipoprotein (LDL) cholesterol  Also known as bad cholesterol, this is the cholesterol that tends to build up along your arteries. Bad cholesterol levels are increased by eating fats that are saturated or hydrogenated. Optimal level of this cholesterol is less than 100. Over 130 starts to get risky for heart disease.    High-density lipoprotein (HDL) cholesterol  Also known as good cholesterol, this type of cholesterol actually carries cholesterol away from your arteries and may, therefore, help lower your risk of having a heart attack. You want this level to be high (ideally greater than 60). It is a risk to have a level less than 40. You can raise this good cholesterol by eating olive oil, canola oil, avocados, or nuts. Exercise raises this level, too. Fat    Fat is calorie dense and packs a lot of calories into a small amount of food. Even though fats should be limited due to their high calorie content, not all fats are bad. In fact, some fats are quite healthful. Fat can be broken down into four main types. The good-for-you fats are:   Monounsaturated fat  found in oils such as olive and canola, avocados, and nuts and natural nut butters; can decrease cholesterol levels, while keeping levels of HDL cholesterol high   Polyunsaturated fat  found in oils such as safflower, sunflower, soybean, corn, and sesame; can decrease total cholesterol and LDL cholesterol   Omega-3 fatty acids  particularly those found in fatty fish (such as salmon, trout, tuna, mackerel, herring, and sardines); can decrease risk of arrhythmias, decrease triglyceride levels, and slightly lower blood pressure   The fats that you want to limit are:   Saturated fat  found in animal products, many fast foods, and a few vegetables; increases total blood cholesterol, including LDL levels   Animal fats that are saturated include: butter, lard, whole-milk dairy products, meat fat, and poultry skin   Vegetable fats that are saturated include: hydrogenated shortening, palm oil, coconut oil, cocoa butter   Hydrogenated or trans fat  found in margarine and vegetable shortening, most shelf stable snack foods, and fried foods; increases LDL and decreases HDL     It is generally recommended that you limit your total fat for the day to less than 30% of your total calories.  If you follow an 1800-calorie heart healthy diet, for example, this would mean 60 grams of fat or less per day. Saturated fat and trans fat in your diet raises your blood cholesterol the most, much more than dietary cholesterol does. For this reason, on a heart-healthy diet, less than 7% of your calories should come from saturated fat and ideally 0% from trans fat. On an 1800-calorie diet, this translates into less than 14 grams of saturated fat per day, leaving 46 grams of fat to come from mono- and polyunsaturated fats.    Food Choices on a Heart Healthy Diet   Food Category   Foods Recommended   Foods to Avoid   Grains   Breads and rolls without salted tops Most dry and cooked cereals Unsalted crackers and breadsticks Low-sodium or homemade breadcrumbs or stuffing All rice and pastas   Breads, rolls, and crackers with salted tops High-fat baked goods (eg, muffins, donuts, pastries) Quick breads, self-rising flour, and biscuit mixes Regular bread crumbs Instant hot cereals Commercially prepared rice, pasta, or stuffing mixes   Vegetables   Most fresh, frozen, and low-sodium canned vegetables Low-sodium and salt-free vegetable juices Canned vegetables if unsalted or rinsed   Regular canned vegetables and juices, including sauerkraut and pickled vegetables Frozen vegetables with sauces Commercially prepared potato and vegetable mixes   Fruits   Most fresh, frozen, and canned fruits All fruit juices   Fruits processed with salt or sodium   Milk   Nonfat or low-fat (1%) milk Nonfat or low-fat yogurt Cottage cheese, low-fat ricotta, cheeses labeled as low-fat and low-sodium   Whole milk Reduced-fat (2%) milk Malted and chocolate milk Full fat yogurt Most cheeses (unless low-fat and low salt) Buttermilk (no more than 1 cup per week)   Meats and Beans   Lean cuts of fresh or frozen beef, veal, lamb, or pork (look for the word loin) Fresh or frozen poultry without the skin Fresh or frozen fish and some shellfish Egg whites and egg substitutes (Limit whole eggs to three per week) Tofu Nuts or seeds (unsalted, dry-roasted), low-sodium peanut butter Dried peas, beans, and lentils   Any smoked, cured, salted, or canned meat, fish, or poultry (including hinojosa, chipped beef, cold cuts, hot dogs, sausages, sardines, and anchovies) Poultry skins Breaded and/or fried fish or meats Canned peas, beans, and lentils Salted nuts   Fats and Oils   Olive oil and canola oil Low-sodium, low-fat salad dressings and mayonnaise   Butter, margarine, coconut and palm oils, hinojosa fat   Snacks, Sweets, and Condiments   Low-sodium or unsalted versions of broths, soups, soy sauce, and condiments Pepper, herbs, and spices; vinegar, lemon, or lime juice Low-fat frozen desserts (yogurt, sherbet, fruit bars) Sugar, cocoa powder, honey, syrup, jam, and preserves Low-fat, trans-fat free cookies, cakes, and pies Steve and animal crackers, fig bars, alexy snaps   High-fat desserts Broth, soups, gravies, and sauces, made from instant mixes or other high-sodium ingredients Salted snack foods Canned olives Meat tenderizers, seasoning salt, and most flavored vinegars   Beverages   Low-sodium carbonated beverages Tea and coffee in moderation Soy milk   Commercially softened water   Suggestions   Make whole grains, fruits, and vegetables the base of your diet. Choose heart-healthy fats such as canola, olive, and flaxseed oil, and foods high in heart-healthy fats, such as nuts, seeds, soybeans, tofu, and fish. Eat fish at least twice per week; the fish highest in omega-3 fatty acids and lowest in mercury include salmon, herring, mackerel, sardines, and canned chunk light tuna. If you eat fish less than twice per week or have high triglycerides, talk to your doctor about taking fish oil supplements. Read food labels.    For products low in fat and cholesterol, look for fat free, low-fat, cholesterol free, saturated fat free, and trans fat freeAlso scan the Nutrition Facts Label, which lists saturated fat, trans fat, and cholesterol amounts. For products low in sodium, look for sodium free, very low sodium, low sodium, no added salt, and unsalted   Skip the salt when cooking or at the table; if food needs more flavor, get creative and try out different herbs and spices. Garlic and onion also add substantial flavor to foods. Trim any visible fat off meat and poultry before cooking, and drain the fat off after moore. Use cooking methods that require little or no added fat, such as grilling, boiling, baking, poaching, broiling, roasting, steaming, stir-frying, and sauting. Avoid fast food and convenience food. They tend to be high in saturated and trans fat and have a lot of added salt. Talk to a registered dietitian for individualized diet advice. Last Reviewed: March 2011 Kristina Diaz MS, MPH, RD   Updated: 3/29/2011   ·   Patient information: Weight loss treatments    INTRODUCTION -- Obesity is a major international problem, and Americans are among the heaviest people in the world. The percentage of obese people in the [place] has risen steadily. Many people find that although they initially lose weight by dieting, they quickly regain the weight after the diet ends. Because it so hard to keep weight off over time, it is important to have as much information and support as possible before starting a diet. You are most likely to be successful in losing weight and keeping it off when you believe that your body weight can be controlled. STARTING A WEIGHT LOSS PROGRAM -- Some people like to talk to their doctor or nurse to get help choosing the best plan, monitoring progress, and getting advice and support along the way. To know what treatment (or combination of treatments) will work best, determine your body mass index (BMI) and waist circumference (measurement). The BMI is calculated from your height and weight.   A person with a BMI between 25 and 29.9 is considered overweight   A person with a BMI of 30 or greater is considered to be obese  A waist circumference greater than 35 inches (88 cm) in women and 40 inches (102 cm) in men increases the risk of obesity-related complications, such as heart disease and diabetes. People who are obese and who have a larger waist size may need more aggressive weight loss treatment than others. Talk to your doctor or nurse for advice. Types of treatment -- Based on your measurements and your medical history, your doctor or nurse can determine what combination of weight loss treatments would work best for you. Treatments may include changes in lifestyle, exercise, dieting, and, in some cases, weight loss medicines or weight loss surgery. Weight loss surgery, also called bariatric surgery, is reserved for people with severe obesity who have not responded to other weight loss treatments. SETTING A WEIGHT LOSS GOAL -- It is important to set a realistic weight loss goal. Your first goal should be to avoid gaining more weight and staying at your current weight (or within 5 percent). Many people have a \"dream\" weight that is difficult or impossible to achieve. People at high risk of developing diabetes who are able to lose 5 percent of their body weight and maintain this weight will reduce their risk of developing diabetes by about 50 percent and reduce their blood pressure. This is a success. Losing more than 15 percent of your body weight and staying at this weight is an extremely good result, even if you never reach your \"dream\" or \"ideal\" weight. LIFESTYLE CHANGES -- Programs that help you to change your lifestyle are usually run by psychologists or other professionals. The goals of lifestyle changes are to help you change your eating habits, become more active, and be more aware of how much you eat and exercise, helping you to make healthier choices. This type of treatment can be broken down into three steps:   The triggers that make you want to eat   Eating   What happens after you eat  Triggers to eat -- Determining what triggers you to eat involves figuring out what foods you eat and where and when you eat. To figure out what triggers you to eat, keep a record for a few days of everything you eat, the places where you eat, how often you eat, and the emotions you were feeling when you ate. For some people, the trigger is related to a certain time of day or night. For others, the trigger is related to a certain place, like sitting at a desk working. Eating -- You can change your eating habits by breaking the chain of events between the trigger for eating and eating itself. There are many ways to do this. For instance, you can:  Limit where you eat to a few places (eg, dining room)   Restrict the number of utensils (eg, only a fork) used for eating   Drink a sip of water between each bite   Chew your food a certain number of times   Get up and stop eating every few minutes  What happens after you eat -- Rewarding yourself for good eating behaviors can help you to develop better habits. This is not a reward for weight loss; instead, it is a reward for changing unhealthy behaviors. Do not use food as a reward. Some people find money, clothing, or personal care (eg, a hair cut, manicure, or massage) to be effective rewards. Treat yourself immediately after making better eating choices to reinforce the value of the good behavior. You need to have clear behavior goals, and you must have a time frame for reaching your goals. Reward small changes along the way to your final goal.  Other factors that contribute to successful weight loss -- Changing your behavior involves more than just changing unhealthy eating habits; it also involves finding people around you to support your weight loss, reducing stress, and learning to be strong when tempted by food. Establish a \"gabriel\" system -- Having a friend or family member available to provide support and reinforce good behavior is very helpful. The support person needs to understand your goals. Learn to be strong -- Learning to be strong when tempted by food is an important part of losing weight. As an example, you will need to learn how to say \"no\" and continue to say no when urged to eat at parties and social gatherings. Develop strategies for events before you go, such as eating before you go or taking low-calorie snacks and drinks with you. Develop a support system -- Having a support system is helpful when losing weight. This is why many [employer] groups are successful. Family support is also essential; if your family does not support your efforts to lose weight, this can slow your progress or even keep you from losing weight. Positive thinking -- People often have conversations with themselves in their head; these conversations can be positive or negative. If you eat a piece of cake that was not planned, you may respond by thinking, \"Oh, you stupid idiot, you've blown your diet! \" and as a result, you may eat more cake. A positive thought for the same event could be, \"Well, I ate cake when it was not on my plan. Now I should do something to get back on track. \" A positive approach is much more likely to be successful than a negative one. Reduce stress -- Although stress is a part of everyday life, it can trigger uncontrolled eating in some people. It is important to find a way to get through these difficult times without eating or by eating low-calorie food, like raw vegetables. It may be helpful to imagine a relaxing place that allows you to temporarily escape from stress. With deep breaths and closed eyes, you can imagine this relaxing place for a few minutes. Self-help programs -- Self-help programs like [employer] Watchers®, Overeaters Anonymous®, and Take Off Mily (TOPS)© work for some people.  As with all weight loss programs, you are most likely to be successful with these plans if you make long-term changes in how you eat.  CHOOSING A DIET -- A calorie is a unit of energy found in food. Your body needs calories to function. The goal of any diet is to burn up more calories than you eat. How quickly you lose weight depends upon several factors, such as your age, gender, and starting weight. Older people have a slower metabolism than young people, so they lose weight more slowly. Men lose more weight than women of similar height and weight when dieting because they use more energy. People who are extremely overweight lose weight more quickly than those who are only mildly overweight. Try not to drink alcohol or drinks with added sugar, and most sweets (candy, cakes, cookies), since they rarely contain important nutrients. Portion-controlled diets -- One simple way to diet is to buy packaged foods, like frozen low-calorie meals or meal-replacement canned drinks. A typical meal plan for one day may include:  A meal-replacement drink or breakfast bar for breakfast   A meal-replacement drink or a frozen low-calorie (250 to 350 calories) meal for lunch   A frozen low-calorie meal or other prepackaged, calorie-controlled meal, along with extra vegetables for dinner  This would give you 1000 to 1500 calories per day. Low-fat diet -- To reduce the amount of fat in your diet, you can:  Eat low-fat foods. Low-fat foods are those that contain less than 30 percent of calories from fat. Fat is listed on the food facts label (figure 1). Count fat grams. For a 1500 calorie diet, this would mean about 45 g or fewer of fat per day. Low-carbohydrate diet -- Low- and very-low-carbohydrate diets (eg, Atkins diet, [employer]) have become popular ways to lose weight quickly.   With a very-low-carbohydrate diet, you eat between 0 and 60 grams of carbohydrates per day (a standard diet contains 200 to 300 grams of carbohydrates)   With a low-carbohydrate diet, you eat between 60 and 130 grams of carbohydrates per day  Carbohydrates are found in fruits, vegetables, and grains (including breads, rice, pasta, and cereal), alcoholic beverages, and in dairy products. Meat and fish do not contain carbohydrates. Side effects of very-low-carbohydrate diets can include constipation, headache, bad breath, muscle cramps, diarrhea, and weakness. Mediterranean diet -- The term \"Mediterranean diet\" refers to a way of eating that is common in [place]growing regions around the [facility]. Although there is some variation in Mediterranean diets, there are some similarities. Most Mediterranean diets include:  A high level of monounsaturated fats (from olive or canola oil, walnuts, pecans, almonds) and a low level of saturated fats (from butter)   A high amount of vegetables, fruits, legumes, and grains (7 to 10 servings of fruits and vegetables per day)   A moderate amount of milk and dairy products, mostly in the form of cheese. Use low-fat dairy products (skim milk, fat-free yogurt, low-fat cheese). A relatively low amount of red meat and meat products. Substitute fish or poultry for red meat. For those who drink alcohol, a modest amount (mainly as red wine) may help to protect against cardiovascular disease. A modest amount is up to one (4 ounce) glass per day for women and up to two glasses per day for men. Which diet is best? -- Studies have compared different diets, including:  Very-low-carbohydrate (Atkins)   Macronutrient balance controlling glycemic load (Zone®)   Reduced-calorie (Weight Watchers®)   Very-low-fat (Ornish)  No one diet is \"best\" for weight loss. Any diet will help you to lose weight if you stick with the diet. Therefore, it is important to choose a diet that includes foods you like. Fad diets -- Fad diets often promise quick weight loss (more than 1 to 2 pounds per week) and may claim that you do not need to exercise or give up favorite foods. Some fad diets cost a lot of money, because you have to pay for seminars or pills. Fad diets generally lack any scientific evidence that they are safe and effective, but instead rely on \"before\" and \"after\" photos or testimonials. Diets that sound too good to be true usually are. These plans are a waste of time and money and are not recommended. A doctor, nurse, or nutritionist can help you find a safe and effective way to lose weight and keep it off. WEIGHT LOSS MEDICINES -- Taking a weight loss medicine may be helpful when used in combination with diet, exercise, and lifestyle changes. However, it is important to understand the risks and benefits of these medicines. It is also important to be realistic about your goal weight using a weight loss medicine; you may not reach your \"dream\" weight, but you may be able to reduce your risk of diabetes or heart disease. Weight loss medicines may be recommended for people who have not been able to lose weight with diet and exercise who have a:  BMI of 30 or more    BMI between 27 and 29.9 and have other medical problems, such as diabetes, high cholesterol, or high blood pressure  Two weight loss medicines are approved in the [place] for long-term use. These are sibutramine and orlistat. Other weight loss medicines (phentermine, diethylpropion) are available but are only approved for short-term use (up to 12 weeks). Sibutramine -- Sibutramine (Meridia®, Reductil®) is a medicine that reduces your appetite. In people who take the medicine for one year, the average weight loss is 10 percent of the initial body weight (5 percent more than those who took a placebo treatment). Side effects of sibutramine include insomnia, dry mouth, and constipation. Increases in blood pressure can occur. Therefore, blood pressure is usually monitored during treatment. There is no evidence that sibutramine causes heart or lung problems (like dexfenfluramine and fenfluramine (Phen/Fen)).  However, experts agree that sibutramine should not used by people with coronary heart disease, heart failure, uncontrolled hypertension, stroke, irregular heart rhythms, or peripheral vascular disease (poor circulation in the legs). Orlistat -- Orlistat (Xenical® 120 mg capsules) is a medicine that reduces the amount of fat your body absorbs from the foods you eat. A lower-dose version is now available without a prescription (Vega® 60 mg capsules) in many countries, including the [place]. The medicine is recommended three times per day, taken with a meal; you can skip a dose if you skip a meal or if the meal contains no fat. After one year of treatment with orlistat, the average weight loss is approximately 8 to 10 percent of initial body weight (4 percent more than in those who took a placebo). Cholesterol levels often improve, and blood pressure sometimes falls. In people with diabetes, orlistat may help control blood sugar levels. Side effects occur in 15 to 10 percent of people and may include stomach cramps, gas, diarrhea, leakage of stool, or oily stools. These problems are more likely when you take orlistat with a high-fat meal (if more than 30 percent of calories in the meal are from fat). Side effects usually improve as you learn to avoid high-fat foods. Severe liver injury has been reported rarely in patients taking orlistat, but it is not known if orlistat caused the liver problems. Diet supplements -- Diet supplements are widely used by people who are trying to lose weight, although the safety and efficacy of these supplements are often unproven. A few of the more common diet supplements are discussed below; none of these are recommended because they have not been studied carefully, and there is no proof they are safe or effective. Chitosan and wheat dextrin are ineffective for weight loss, and their use is not recommended. Ephedra, a compound related to ephedrine, is no longer available in the [place] due to safety concerns.  Many nonprescription diet pills need to work with these providers for several weeks or months before surgery. The nutritionist will explain what and how much you will be able to eat after surgery. You may also need to lose a small amount of weight before surgery. The mental health specialist will help you to cope with stress and other factors that can make it harder to lose weight or trigger you to eat   The medical doctor will determine whether you need other tests, counseling, or treatment before surgery. He or she might also help you begin a medical weight loss program so that you can lose some weight before surgery. The bariatric surgeon will meet with you to discuss the surgeries available to treat obesity. He or she will also make sure you are a good candidate for surgery. TYPES OF WEIGHT LOSS SURGERY -- There are several types of weight loss surgeries, the most common being lap banding, gastric bypass, and gastric sleeve. Lap banding -- Laparoscopic adjustable gastric banding (LAGB), or lap banding, is a surgery that uses an adjustable band around the opening to the stomach (figure 1). This reduces the amount of food that you can eat at one time. Lap banding is done through small incisions, with a laparoscope. The band can be adjusted after surgery, allowing you to eat more or less food. Adjustments to the size and tightness of the band are made by using a needle to add or remove fluid from a port (a small container under the skin that is connected to the band). Adding fluid to the band makes it tighter which restricts the amount of food you can eat and may help you to lose more weight. Lap banding is a popular choice because it is relatively simple to perform, can be adjusted or removed, and has a low risk of serious complications immediately after surgery. However, weight loss with the lap band depends on your ability to follow the program closely.   You will need to prepare nutritious meals that \"work with\" the band, not against it. For example, the lap band will not work well if you eat or drink a large amount of liquid calories (like ice cream). The band will not help you to feel full when you eat/drink liquid calories. Weight loss ranges from 45 to 75 percent after two years. As an example, a person who is 120 pounds overweight could expect to lose approximately 54 to 90 pounds in the two years after lap banding. Gastric bypass -- Lacy-en-Y gastric bypass, also called gastric bypass, helps you to lose weight by reducing the amount of food you can eat and reducing the number of calories and nutrients you absorb from the food you eat. To perform gastric bypass, a surgeon creates a small stomach pouch by dividing the stomach and attaching it to the small intestine. This helps you to lose weight in two ways: The smaller stomach can hold less food than before surgery. This causes you to feel full after eating a very small amount of food or liquid. Over time, the pouch might stretch, allowing you to eat more food. The body absorbs fewer calories, since food bypasses most of the stomach as well as the upper small intestine. This new arrangement seems to decrease your appetite and change how you break down foods by changing the release of various hormones. Gastric bypass can be performed as open surgery (through an incision on the abdomen) or laparoscopically, which uses smaller incisions and smaller instruments. Both the laparoscopic and open techniques have risks and benefits. You and your surgeon should work together to decide which surgery, if any, is right for you. Gastric bypass has a high success rate, and people lose an average of 62 to 68 percent of their excess body weight in the first year. Weight loss typically levels off after one to two years, with an overall excess weight loss between 50 and 75 percent. For a person who is 120 pounds overweight, an average of 60 to 90 pounds of weight loss would be expected.   Gastric sleeve -- Gastric sleeve, also known as sleeve gastrectomy, is a surgery that reduces the size of the stomach and makes it into a narrow tube (figure 3). The new stomach is much smaller and produces less of the hormone (ghrelin) that causes hunger, helping you feel satisfied with less food. Sleeve gastrectomy is safer than gastric bypass because the intestines are not rearranged, and there is less chance of malnutrition. It also appears to control hunger better than lap banding. It might be safer than the lap banding because no foreign materials are used. The gastric sleeve has a good success rate, and people lose an average of 33 percent of their excess body weight in the first year. For a person who is 120 pounds overweight, this would mean losing about 40 pounds in the first year. WEIGHT LOSS SURGERY COMPLICATIONS -- A variety of complications can occur with weight loss surgery. The risks of surgery depend upon which surgery you have and any medical problems you had before surgery. Some of the more common early surgical complications (one to six weeks after surgery) include:  Bleeding   Infection   Blockage or tear in the bowels   Need for further surgery  Important medical complications after surgery can include blood clots in the legs or lungs, heart attack, pneumonia, and urinary tract infection. Complications are less likely when surgery is performed in centers that are experienced in weight loss surgery. In general, centers with experience in weight loss surgery have:  Board-certified doctors and surgeons   A team of support staff (dietitians, counselors, nurses)   Long-term follow-up after surgery   Hospital staff experienced with the care of weight loss patients. This includes nurses who are trained in the care of patients immediately after surgery and anesthesiologists who are experienced in caring for the morbidly obese.   EFFECTIVENESS OF WEIGHT LOSS SURGERY -- The goal of weight loss surgery is to reduce the risk of illness or death associated with obesity. Weight loss surgery can also help you to feel and look better, reduce the amount of money you spend on medicines, and cut down on sick days. As an example, weight loss surgery can improve health problems related to obesity (diabetes, high blood pressure, high cholesterol, sleep apnea) to the point that you need less or no medicine. Finally, weight loss surgery might reduce your risk of developing heart disease, cancer, and certain infections. AFTER WEIGHT LOSS SURGERY -- You will need to stay in the hospital until your team feels that it is safe for you to leave (on average, one to three days). Do not drive if you are taking prescription pain medicine. Begin exercising as soon as possible once you have healed; most weight loss centers will design an exercise program for you. Once you are home, it is important to eat and drink exactly what your doctor and dietitian recommend. You will see your doctor, nurse, and dietitian on a regular basis after surgery to monitor your health, diet, and weight loss. You will be able to slowly increase how much you eat over time, although it will always be important to:  Eat small, frequent meals and not skip meals   Chew your food slowly and completely   Avoid eating while \"distracted\" (such as eating while watching TV)   Stop eating when you feel full   Drink liquids at least 30 minutes before or after eating   Avoid foods high in fat or sugar   Take vitamin supplements, as recommended  It can take several months to learn to listen to your body so that you know when you are hungry and when you are full. You may dislike foods you previously loved, and you may begin to prefer new foods. This can be a frustrating process for some people, so talk to your dietitian if you are having trouble. It usually takes between one and two years to lose weight after surgery.  After reaching their goal weight, some people have plastic surgery (called \"body contouring\") to remove excess skin from the body, particularly in the abdominal area. Before you decide to have weight loss surgery, you must commit to staying healthy for life. This includes following up with your healthcare team, exercising most days of the week, and eating a sensible diet every day. It can be difficult to develop new eating and exercise habits after weight loss surgery, and you will have to work hard to stick to your goals. Recovering from surgery and losing weight can be stressful and emotional, and it is important to have the support of family and friends. Working with a , therapist, or support group can help you through the ups and downs. WHERE TO GET MORE INFORMATION -- Your healthcare provider is the best source of information for questions and concerns related to your medical problem.   This article will be updated as needed every four months on our Web site ([email or web address])  ·

## 2022-07-21 DIAGNOSIS — R21 RASH AND OTHER NONSPECIFIC SKIN ERUPTION: ICD-10-CM

## 2022-07-21 RX ORDER — KETOCONAZOLE 20 MG/G
CREAM TOPICAL
Qty: 60 G | Refills: 0 | Status: SHIPPED | OUTPATIENT
Start: 2022-07-21

## 2022-07-21 NOTE — TELEPHONE ENCOUNTER
Patient requesting medication refill. Please approve or deny this request. Patient has rash again, needs medication as soon as possible. Rx requested:  Requested Prescriptions     Pending Prescriptions Disp Refills    ketoconazole (NIZORAL) 2 % cream 60 g 0     Sig: Apply topically twice daily for 2 - 4 weeks (or for 1 week after lesions have healed).          Last Office Visit:   5/23/2022      Next Visit Date:  Future Appointments   Date Time Provider Ariadna Bright   9/1/2022  2:30 PM T[facility] BONE DENSITY ROOM 1 [facility] RAD   9/1/2022  3:00 PM TICO MAMMOGRAPHY ROOM [address] [place] [address] RAD   11/22/2022 11:00 AM MD Girish Casillas 89

## 2022-07-30 ENCOUNTER — APPOINTMENT (OUTPATIENT)
Dept: GENERAL RADIOLOGY | Age: 82
End: 2022-07-30
Payer: MEDICARE

## 2022-07-30 ENCOUNTER — HOSPITAL ENCOUNTER (EMERGENCY)
Age: 82
Discharge: HOME | End: 2022-07-30
Payer: MEDICARE

## 2022-07-30 VITALS
TEMPERATURE: 98.3 F | RESPIRATION RATE: 16 BRPM | DIASTOLIC BLOOD PRESSURE: 70 MMHG | OXYGEN SATURATION: 98 % | SYSTOLIC BLOOD PRESSURE: 137 MMHG | HEART RATE: 82 BPM

## 2022-07-30 DIAGNOSIS — K59.00 CONSTIPATION, UNSPECIFIED CONSTIPATION TYPE: ICD-10-CM

## 2022-07-30 DIAGNOSIS — K64.4 BLEEDING EXTERNAL HEMORRHOIDS: Primary | ICD-10-CM

## 2022-07-30 PROCEDURE — 74018 RADEX ABDOMEN 1 VIEW: CPT

## 2022-07-30 PROCEDURE — 6370000000 HC RX 637 (ALT 250 FOR IP): Performed by: PHYSICIAN ASSISTANT

## 2022-07-30 PROCEDURE — 99283 EMERGENCY DEPT VISIT LOW MDM: CPT

## 2022-07-30 RX ORDER — SENNA AND DOCUSATE SODIUM 50; 8.6 MG/1; MG/1
1 TABLET, FILM COATED ORAL DAILY
Qty: 30 TABLET | Refills: 0 | Status: SHIPPED | OUTPATIENT
Start: 2022-07-30 | End: 2022-08-29

## 2022-07-30 RX ORDER — SENNA AND DOCUSATE SODIUM 50; 8.6 MG/1; MG/1
2 TABLET, FILM COATED ORAL ONCE
Status: COMPLETED | OUTPATIENT
Start: 2022-07-30 | End: 2022-07-30

## 2022-07-30 RX ORDER — HYDROCORTISONE 25 MG/G
CREAM TOPICAL 2 TIMES DAILY
Qty: 28 G | Refills: 0 | Status: SHIPPED | OUTPATIENT
Start: 2022-07-30

## 2022-07-30 RX ADMIN — SENNOSIDES AND DOCUSATE SODIUM 2 TABLET: 50; 8.6 TABLET ORAL at 12:59

## 2022-07-30 ASSESSMENT — PAIN - FUNCTIONAL ASSESSMENT: PAIN_FUNCTIONAL_ASSESSMENT: 0-10

## 2022-07-30 ASSESSMENT — PAIN SCALES - GENERAL: PAINLEVEL_OUTOF10: 0

## 2022-07-31 ASSESSMENT — ENCOUNTER SYMPTOMS
ABDOMINAL PAIN: 0
CONSTIPATION: 1
BLOOD IN STOOL: 1
COUGH: 0
BACK PAIN: 0
PHOTOPHOBIA: 0
RECTAL PAIN: 0
VOMITING: 0
ANAL BLEEDING: 1
SHORTNESS OF BREATH: 0
DIARRHEA: 0
NAUSEA: 0

## 2022-08-08 ENCOUNTER — OFFICE VISIT (OUTPATIENT)
Dept: FAMILY MEDICINE CLINIC | Age: 82
End: 2022-08-08
Payer: MEDICARE

## 2022-08-08 VITALS
BODY MASS INDEX: 42.01 KG/M2 | HEIGHT: 60 IN | TEMPERATURE: 97.2 F | DIASTOLIC BLOOD PRESSURE: 68 MMHG | WEIGHT: 214 LBS | OXYGEN SATURATION: 95 % | SYSTOLIC BLOOD PRESSURE: 120 MMHG | HEART RATE: 72 BPM

## 2022-08-08 DIAGNOSIS — L30.4 INTERTRIGO: ICD-10-CM

## 2022-08-08 DIAGNOSIS — K64.4 EXTERNAL HEMORRHOIDS: Primary | ICD-10-CM

## 2022-08-08 DIAGNOSIS — K62.5 RECTAL BLEEDING: ICD-10-CM

## 2022-08-08 DIAGNOSIS — R09.82 POST-NASAL DRAINAGE: ICD-10-CM

## 2022-08-08 PROCEDURE — 99213 OFFICE O/P EST LOW 20 MIN: CPT | Performed by: FAMILY MEDICINE

## 2022-08-08 PROCEDURE — 1123F ACP DISCUSS/DSCN MKR DOCD: CPT | Performed by: FAMILY MEDICINE

## 2022-08-08 RX ORDER — NYSTATIN 100000 U/G
CREAM TOPICAL 2 TIMES DAILY
Qty: 60 G | Refills: 1 | Status: SHIPPED | OUTPATIENT
Start: 2022-08-08

## 2022-08-08 RX ORDER — IPRATROPIUM BROMIDE 42 UG/1
2 SPRAY, METERED NASAL 3 TIMES DAILY
Qty: 15 ML | Refills: 5 | Status: SHIPPED | OUTPATIENT
Start: 2022-08-08

## 2022-08-08 SDOH — ECONOMIC STABILITY: FOOD INSECURITY: WITHIN THE PAST 12 MONTHS, THE FOOD YOU BOUGHT JUST DIDN'T LAST AND YOU DIDN'T HAVE MONEY TO GET MORE.: NEVER TRUE

## 2022-08-08 SDOH — ECONOMIC STABILITY: FOOD INSECURITY: WITHIN THE PAST 12 MONTHS, YOU WORRIED THAT YOUR FOOD WOULD RUN OUT BEFORE YOU GOT MONEY TO BUY MORE.: NEVER TRUE

## 2022-08-08 ASSESSMENT — ENCOUNTER SYMPTOMS
CHEST TIGHTNESS: 0
ABDOMINAL PAIN: 0
BLOOD IN STOOL: 0
SHORTNESS OF BREATH: 0
NAUSEA: 0
VOMITING: 0
RECTAL PAIN: 0
ABDOMINAL DISTENTION: 0
DIARRHEA: 0
CONSTIPATION: 0

## 2022-08-08 ASSESSMENT — SOCIAL DETERMINANTS OF HEALTH (SDOH): HOW HARD IS IT FOR YOU TO PAY FOR THE VERY BASICS LIKE FOOD, HOUSING, MEDICAL CARE, AND HEATING?: NOT HARD AT ALL

## 2022-08-08 NOTE — PROGRESS NOTES
Jason Akhtar (: 1940) is a 80 y.o. female, Established patient, who presents today for:    Chief Complaint   Patient presents with    Follow-up     Patient is present for ED f/u          ASSESSMENT/PLAN    1. External hemorrhoids  Comments:  No current active bleeding. Patient to keep upcoming visit with colorectal surgeon to discuss any further management. 2. Rectal bleeding  Comments:  Resolved since ER visit. Patient is overdue for labwork and was given reprinted orders from 2022 visit to have done in the next several days. 3. Intertrigo  -     nystatin (MYCOSTATIN) 613576 UNIT/GM cream; Apply topically in the morning and at bedtime Apply topically 2 times daily. , Topical, 2 times daily Starting Mon 2022, Disp-60 g, R-1, Normal  4. Post-nasal drainage  -     ipratropium (ATROVENT) 0.06 % nasal spray; 2 sprays by Nasal route in the morning and 2 sprays at noon and 2 sprays before bedtime. , Disp-15 mL, R-5Normal      Return for [address] VISIT 2022. SUBJECTIVE/OBJECTIVE:    HPI    Patient presents for ER follow-up visit. They were seen at [facility] [place] [facility] on 2022 complaints of bright red blood in the toilet following a constipated bowel movement with increased straining. There was no reported abdominal or rectal pain. Bright red blood was reported in the toilet and on toilet paper with wiping. Vital signs were documented as unremarkable physical exam was documented to show multiple nonthrombosed external hemorrhoids with no signs of active bleeding. An abdominal x-ray was documented to show nonobstructive bowel gas pattern. Patient was found stable for discharge home to follow-up with colorectal surgeon and primary care as an outpatient. Patient reports feeling well overall since ER discharge. There are no reported further episodes of rectal bleeding and patient denies any rectal pain with bowel movements.   Patient reports BM's every 2-3 days without any increased straining. She denies any persistent constipation, abdominal pain, nausea/vomiting, or change in appetite. They deny any fever/chills/sweats, worsening fatigue, lightheadedness, dizziness, chest pain, dyspnea, palpitations, or syncope. Current Outpatient Medications on File Prior to Visit   Medication Sig Dispense Refill    hydrocortisone (ANUSOL-HC) 2.5 % CREA rectal cream Place rectally 2 times daily 28 g 0    sennosides-docusate sodium (SENOKOT-S) 8.6-50 MG tablet Take 1 tablet by mouth in the morning. 30 tablet 0    ketoconazole (NIZORAL) 2 % cream Apply topically twice daily for 2 - 4 weeks (or for 1 week after lesions have healed). 60 g 0    Glycerin-Polysorbate 80 (REFRESH DRY EYE THERAPY OP) Apply to eye      ezetimibe (ZETIA) 10 MG tablet Take 1 tablet by mouth daily 90 tablet 1    triamterene-hydroCHLOROthiazide (DYAZIDE) 37.5-25 MG per capsule Take 1 capsule by mouth daily 90 capsule 1    levothyroxine (SYNTHROID) 50 MCG tablet Take 1 tablet by mouth Daily 90 tablet 1    metoprolol succinate (TOPROL XL) 25 MG extended release tablet Take 1 tablet by mouth daily 90 tablet 1    docusate sodium (COLACE) 100 MG capsule Take 1 capsule by mouth 2 times daily as needed for Constipation 60 capsule 2    Multiple Vitamins-Minerals (PRESERVISION AREDS 2 PO) Take by mouth      Handicap Placard MISC by Does not apply route DX: GENERALIZED OSTEOARTHRITIS (M15.9), 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 No current facility-administered medications on file prior to visit.        Allergies   Allergen Reactions    Fenofibrate Other (See Comments)     myalgias    Statins Other (See Comments)     myalgias    Pcn [Penicillins] Rash        Review of Systems   Constitutional:  Negative for appetite change, chills, diaphoresis, fatigue and fever. Respiratory:  Negative for chest tightness and shortness of breath. Cardiovascular:  Negative for chest pain and palpitations. Gastrointestinal:  Negative for abdominal distention, abdominal pain, blood in stool, constipation, diarrhea, nausea, rectal pain and vomiting. Anal bleeding: resolved. No heartburn, No melena   Skin:  Positive for rash (under lower abdominal fold). Neurological:  Negative for dizziness, syncope and light-headedness. Vitals:  /68 (Site: Right Upper Arm, Position: Sitting, Cuff Size: Medium Adult)   Pulse 72   Temp 97.2 °F (36.2 °C) (Temporal)   Ht 5' (1.524 m)   Wt 214 lb (97.1 kg)   SpO2 95%   BMI 41.79 kg/m²     Physical Exam  Vitals reviewed. Constitutional:       General: She is not in acute distress. Appearance: Normal appearance. Cardiovascular:      Rate and Rhythm: Normal rate and regular rhythm. Heart sounds: No murmur heard. Pulmonary:      Effort: Pulmonary effort is normal. No respiratory distress. Breath sounds: Normal breath sounds. No wheezing, rhonchi or rales. Abdominal:      General: Bowel sounds are normal.      Palpations: Abdomen is soft. Tenderness: There is no abdominal tenderness. There is no guarding or rebound. Genitourinary:     Comments: Deferred to colorectal surgeon  Musculoskeletal:      Right lower leg: No edema. Left lower leg: No edema. Skin:     Findings: Rash (erythematous macular rash under lower abdominal fold/pannus, no areas of broken skin or weeping/drainage/bleeding) present. Neurological:      Mental Status: She is alert and oriented to person, place, and time. Psychiatric:         Mood and Affect: Mood normal.         Behavior: Behavior normal.         Thought Content:  Thought content normal.       Ortho Exam (If Applicable)              An electronic signature was used to authenticate this note.      Karen Parker MD

## 2022-08-08 NOTE — PATIENT INSTRUCTIONS
For conservative constipation management:  1. Increase water intake to 48-64 oz daily  2. Eat a high fiber diet (fruits, vegetables, whole grains)  3. Start a daily over-the-counter fiber supplement (ex: Metamucil, Konsyl, Benefiber, Citrucel, FiberCon)   4. Take a daily over-the-counter stool softener twice daily (ex: Colace, Docusate)  5. Take a daily probiotic over-the-counter for 4 weeks (ex: Align, Culturelle, [employer])  6.  After 3-4 days without a bowel movement despite the above measures, try Miralax over-the-counter

## 2022-08-12 ENCOUNTER — HOSPITAL ENCOUNTER (OUTPATIENT)
Dept: LAB | Age: 82
Discharge: HOME | End: 2022-08-12
Payer: MEDICARE

## 2022-08-12 DIAGNOSIS — E66.01 MORBIDLY OBESE (HCC): ICD-10-CM

## 2022-08-12 DIAGNOSIS — I10 PRIMARY HYPERTENSION: Chronic | ICD-10-CM

## 2022-08-12 DIAGNOSIS — R73.03 PRE-DIABETES: ICD-10-CM

## 2022-08-12 DIAGNOSIS — E78.2 MIXED HYPERLIPIDEMIA: Chronic | ICD-10-CM

## 2022-08-12 DIAGNOSIS — E83.52 HYPERCALCEMIA: ICD-10-CM

## 2022-08-12 DIAGNOSIS — E03.9 HYPOTHYROIDISM, UNSPECIFIED TYPE: Chronic | ICD-10-CM

## 2022-08-12 LAB
ALBUMIN SERPL-MCNC: 3.9 G/DL (ref 3.5–4.6)
ALP BLD-CCNC: 83 U/L (ref 40–130)
ALT SERPL-CCNC: 14 U/L (ref 0–33)
ANION GAP SERPL CALCULATED.3IONS-SCNC: 13 MEQ/L (ref 9–15)
AST SERPL-CCNC: 15 U/L (ref 0–35)
BASOPHILS ABSOLUTE: 0 K/UL (ref 0–0.2)
BASOPHILS RELATIVE PERCENT: 0.7 %
BILIRUB SERPL-MCNC: 0.4 MG/DL (ref 0.2–0.7)
BUN BLDV-MCNC: 17 MG/DL (ref 8–23)
CALCIUM SERPL-MCNC: 9.8 MG/DL (ref 8.5–9.9)
CHLORIDE BLD-SCNC: 100 MEQ/L (ref 95–107)
CHOLESTEROL, TOTAL: 203 MG/DL (ref 0–199)
CO2: 25 MEQ/L (ref 20–31)
CREAT SERPL-MCNC: 0.69 MG/DL (ref 0.5–0.9)
EOSINOPHILS ABSOLUTE: 0.2 K/UL (ref 0–0.7)
EOSINOPHILS RELATIVE PERCENT: 2.6 %
GFR AFRICAN AMERICAN: >60
GFR NON-AFRICAN AMERICAN: >60
GLOBULIN: 2.8 G/DL (ref 2.3–3.5)
GLUCOSE BLD-MCNC: 85 MG/DL (ref 70–99)
HBA1C MFR BLD: 5.6 % (ref 4.8–5.9)
HCT VFR BLD CALC: 37.4 % (ref 37–47)
HDLC SERPL-MCNC: 45 MG/DL (ref 40–59)
HEMOGLOBIN: 12.1 G/DL (ref 12–16)
LDL CHOLESTEROL CALCULATED: 107 MG/DL (ref 0–129)
LYMPHOCYTES ABSOLUTE: 1 K/UL (ref 1–4.8)
LYMPHOCYTES RELATIVE PERCENT: 15.5 %
MCH RBC QN AUTO: 28.2 PG (ref 27–31.3)
MCHC RBC AUTO-ENTMCNC: 32.4 % (ref 33–37)
MCV RBC AUTO: 87.1 FL (ref 82–100)
MONOCYTES ABSOLUTE: 0.7 K/UL (ref 0.2–0.8)
MONOCYTES RELATIVE PERCENT: 11.2 %
NEUTROPHILS ABSOLUTE: 4.4 K/UL (ref 1.4–6.5)
NEUTROPHILS RELATIVE PERCENT: 70 %
PDW BLD-RTO: 14.1 % (ref 11.5–14.5)
PLATELET # BLD: 273 K/UL (ref 130–400)
POTASSIUM SERPL-SCNC: 3.8 MEQ/L (ref 3.4–4.9)
RBC # BLD: 4.3 M/UL (ref 4.2–5.4)
SODIUM BLD-SCNC: 138 MEQ/L (ref 135–144)
T4 FREE: 1.26 NG/DL (ref 0.84–1.68)
TOTAL PROTEIN: 6.7 G/DL (ref 6.3–8)
TRIGL SERPL-MCNC: 253 MG/DL (ref 0–150)
TSH SERPL DL<=0.05 MIU/L-ACNC: 5.19 UIU/ML (ref 0.44–3.86)
WBC # BLD: 6.4 K/UL (ref 4.8–10.8)

## 2022-08-12 PROCEDURE — 85025 COMPLETE CBC W/AUTO DIFF WBC: CPT

## 2022-08-12 PROCEDURE — 84443 ASSAY THYROID STIM HORMONE: CPT

## 2022-08-12 PROCEDURE — 82306 VITAMIN D 25 HYDROXY: CPT

## 2022-08-12 PROCEDURE — 83036 HEMOGLOBIN GLYCOSYLATED A1C: CPT

## 2022-08-12 PROCEDURE — 36415 COLL VENOUS BLD VENIPUNCTURE: CPT

## 2022-08-12 PROCEDURE — 80053 COMPREHEN METABOLIC PANEL: CPT

## 2022-08-12 PROCEDURE — 80061 LIPID PANEL: CPT

## 2022-08-12 PROCEDURE — 84439 ASSAY OF FREE THYROXINE: CPT

## 2022-08-13 LAB — VITAMIN D 25-HYDROXY: 38 NG/ML

## 2022-08-16 ENCOUNTER — OFFICE VISIT (OUTPATIENT)
Dept: SURGERY | Age: 82
End: 2022-08-16
Payer: MEDICARE

## 2022-08-16 VITALS
HEART RATE: 101 BPM | WEIGHT: 214 LBS | OXYGEN SATURATION: 92 % | HEIGHT: 60 IN | BODY MASS INDEX: 42.01 KG/M2 | TEMPERATURE: 97.4 F

## 2022-08-16 DIAGNOSIS — K64.2 GRADE III HEMORRHOIDS: Primary | ICD-10-CM

## 2022-08-16 PROCEDURE — 99203 OFFICE O/P NEW LOW 30 MIN: CPT | Performed by: COLON & RECTAL SURGERY

## 2022-08-16 PROCEDURE — 46221 LIGATION OF HEMORRHOID(S): CPT | Performed by: COLON & RECTAL SURGERY

## 2022-08-16 PROCEDURE — 1123F ACP DISCUSS/DSCN MKR DOCD: CPT | Performed by: COLON & RECTAL SURGERY

## 2022-08-16 ASSESSMENT — ENCOUNTER SYMPTOMS
CONSTIPATION: 0
ANAL BLEEDING: 0
COLOR CHANGE: 0
ABDOMINAL PAIN: 0
VOMITING: 0
CHEST TIGHTNESS: 0
SHORTNESS OF BREATH: 0
DIARRHEA: 1

## 2022-08-16 NOTE — PROGRESS NOTES
Subjective:      Patient ID: Neville Murdock is a 80 y.o. female who presents for:  Chief Complaint   Patient presents with    New Patient       This is an 80-year-old female who was seen in the emergency department for rectal bleeding which has since resolved. She is currently not having any issues. Past medical and surgical history reviewed. Colonoscopy 2014 reviewed. She was told she no longer requires follow-up since that time. She denies abdominal pain or unintentional weight loss. She denies any family history of colon disease including cancer.       Past Medical History:   Diagnosis Date    Abnormal ultrasound of breast     Abscess of abdominal wall 11/5/2016    Bilateral carpal tunnel syndrome 4/23/2018    Bleeding hemorrhoid 3/17/2015    Breast cancer, right ([address] 2003    s/p partial mastectomy, radiation    Carbuncle of abdominal wall 11/5/2016    Ductal carcinoma in situ of breast     right    Fibrocystic disease of right breast     Generalized osteoarthritis 8/4/2014    GERD (gastroesophageal reflux disease)     History of breast cancer in female 8/4/2014    History of colon polyps 5/23/2022    HTN (hypertension) 8/4/2014    Hyperlipidemia     Hypertension     Hypothyroidism 12/2/2016    Insomnia 6/16/2015    Macula lutea degeneration     Moderate persistent asthma 11/25/2020    Moderate persistent asthma 11/25/2020    MRSA (methicillin resistant Staphylococcus aureus) infection     MRSA infection 11/2016    LLQ abdominal wall    Obesity     Postmenopausal 2/9/2017    Pre-diabetes 7/23/2019    Scoliosis     Subclinical hypothyroidism 5/6/2014    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 SURGERY N/A 11/06/2016    ABDOMINAL INCISION AND DRAINAGE performed by Kimberly Canela MD at [address] [place] [address]  04/15/2005    COLONOSCOPY Transportation Needs: Not on file   Physical Activity: Insufficiently Active    Days of Exercise per Week: 3 days    Minutes of Exercise per Session: 30 min   Stress: Not on file   Social Connections: Not on file   Intimate Partner Violence: Not on file   Housing Stability: Not on file     Family History   Problem Relation Age of Onset    Heart Disease Father     Heart Attack Father     Diabetes Mother      Allergies:  Fenofibrate, Statins, and Pcn [penicillins]    Review of Systems   Constitutional:  Negative for activity change, appetite change and unexpected weight change. HENT:  Negative for congestion. Respiratory:  Negative for chest tightness and shortness of breath. Cardiovascular:  Negative for chest pain. Gastrointestinal:  Positive for diarrhea. Negative for abdominal pain, anal bleeding, constipation and vomiting. Genitourinary:  Negative for difficulty urinating. Musculoskeletal:  Negative for arthralgias. Skin:  Negative for color change. Neurological:  Negative for dizziness and headaches. Hematological:  Does not bruise/bleed easily. Psychiatric/Behavioral:  Negative for agitation. Objective:    Pulse (!) 101   Temp 97.4 °F (36.3 °C) (Temporal)   Ht 5' (1.524 m)   Wt 214 lb (97.1 kg)   SpO2 92%   BMI 41.79 kg/m²     Physical Exam  Constitutional:       Appearance: She is well-developed. HENT:      Head: Normocephalic and atraumatic. Eyes:      Pupils: Pupils are equal, round, and reactive to light. Cardiovascular:      Rate and Rhythm: Normal rate and regular rhythm. Heart sounds: Normal heart sounds. Pulmonary:      Effort: Pulmonary effort is normal. No respiratory distress. Breath sounds: Normal breath sounds. No wheezing. Abdominal:      General: There is no distension. Palpations: There is no mass. Tenderness: There is no abdominal tenderness. There is no guarding or rebound.    Genitourinary:     Comments: Anal inspection showed prolapsing internal hemorrhoid disease. Anoscopy showed grade 3 internal hemorrhoids circumferentially but an area in the right anterior which showed easy friability and concerns about recent hemorrhage. Musculoskeletal:         General: Normal range of motion. Cervical back: Normal range of motion and neck supple. Skin:     General: Skin is warm and dry. Coloration: Skin is not pale. Findings: No erythema or rash. Neurological:      Mental Status: She is alert and oriented to person, place, and time. Psychiatric:         Behavior: Behavior normal.         Judgment: Judgment normal.     Procedure: I discussed with the patient the risks and benefits of hemorrhoid banding. Risks of infection bleeding and recurrence of hemorrhoids were all addressed. Postoperative pain post-banding was discussed as well. I discussed the risks and benefits, and the patient wishes to proceed. Proper consents were obtained. Medication and allergy list reviewed. With the patient in left lateral position a lubricated anoscope was inserted without difficulty. The above-mentioned hemorrhoids were located in the Right anterior after the hemorrhoids were grasped to ensure that they were insensate. Once appropriate, the hemorrhoid bands were deployed. Excellent results were obtained. The anoscope was removed. Patient tolerated procedure without difficulty. There was no blood loss during the procedure. Assessment/Plan:          Diagnosis Orders   1. Grade III hemorrhoids          Post banding instructions were given    Tips for assistance with diarrhea including fiber supplementation and Imodium discussed. Supplementation products addressed with Internet resources. Patient will return back to see me in the office in 4 to 6 weeks if any bleeding were to recur. If there are any ways I can be of further assistance in the future, I asked her to call.             Please note this report has beenpartially produced using speech recognition software and may cause contain errors related to that system including grammar, punctuation and spelling as well as words and phrases that may seem inappropriate.  If there arequestions or concerns please feel free to contact me to clarify

## 2022-08-19 NOTE — RESULT ENCOUNTER NOTE
Vitamin D normal, CMP normal, lipid panel with high  and high   High TSH 5.19, normal free T4 1.26, normal A1c 5.6, CBC normal    Please notify patient that recent lab work shows the followin. Her blood counts are normal, her kidney and liver function testing is normal, her blood sugar is normal, and her vitamin D level is normal.  2.  Her thyroid testing is stable, she should continue with her current dose of levothyroxine. 3.  Her lipid panel shows high total cholesterol and high triglycerides. She should continue with her current dose of Zetia and double down on her efforts to eat a lower carbohydrate and lower saturated fat diet.

## 2022-08-23 ENCOUNTER — TELEPHONE (OUTPATIENT)
Dept: FAMILY MEDICINE CLINIC | Age: 82
End: 2022-08-23

## 2022-08-23 NOTE — TELEPHONE ENCOUNTER
----- Message from Mantara sent at 8/22/2022 10:17 AM EDT -----  Subject: Message to Provider    QUESTIONS  Information for Provider? Patient said that she was called about her   results, tried to call back and wasn't getting an answer. She asked if   someone could try to call her back again please.   ---------------------------------------------------------------------------  --------------  Han OVIEDO  [phone]; OK to leave message on voicemail  ---------------------------------------------------------------------------  --------------  SCRIPT ANSWERS  Relationship to Patient?  Self

## 2022-09-01 ENCOUNTER — HOSPITAL ENCOUNTER (OUTPATIENT)
Dept: WOMENS IMAGING | Age: 82
Discharge: HOME | End: 2022-09-03
Payer: MEDICARE

## 2022-09-01 DIAGNOSIS — Z78.0 POSTMENOPAUSAL: ICD-10-CM

## 2022-09-01 DIAGNOSIS — Z13.820 SCREENING FOR OSTEOPOROSIS: ICD-10-CM

## 2022-09-01 DIAGNOSIS — Z12.31 ENCOUNTER FOR SCREENING MAMMOGRAM FOR MALIGNANT NEOPLASM OF BREAST: ICD-10-CM

## 2022-09-01 PROCEDURE — 77080 DXA BONE DENSITY AXIAL: CPT

## 2022-09-01 PROCEDURE — 77063 BREAST TOMOSYNTHESIS BI: CPT

## 2022-09-08 DIAGNOSIS — M81.0 AGE-RELATED OSTEOPOROSIS WITHOUT CURRENT PATHOLOGICAL FRACTURE: Primary | ICD-10-CM

## 2022-09-08 RX ORDER — ALENDRONATE SODIUM 70 MG/1
70 TABLET ORAL
Qty: 12 TABLET | Refills: 1 | Status: SHIPPED | OUTPATIENT
Start: 2022-09-08

## 2022-09-08 NOTE — RESULT ENCOUNTER NOTE
Osteoporosis lumbar spine, osteopenia right hip    Please notify patient that recent bone density testing shows severe osteoporosis to the lumbar spine and osteopenia to the right hip. I would like to start her on a medication to help strengthen the bones and reduce the risk of subsequent fracture. I have sent a prescription for alendronate 70 mg ONCE A WEEK to her local pharmacy. Please instruct her to start this medication. She should take it with a full glass of water and remain upright (avoid laying down) for 30 mins after taking the medication. She should contact the office if she develops any worsening heartburn, abdominal pain, or nausea/vomiting with use of this new medication.

## 2022-09-16 ENCOUNTER — HOSPITAL ENCOUNTER (EMERGENCY)
Age: 82
Discharge: HOME | End: 2022-09-16
Attending: EMERGENCY MEDICINE
Payer: MEDICARE

## 2022-09-16 VITALS
RESPIRATION RATE: 18 BRPM | SYSTOLIC BLOOD PRESSURE: 133 MMHG | HEART RATE: 91 BPM | BODY MASS INDEX: 41.79 KG/M2 | TEMPERATURE: 97.7 F | DIASTOLIC BLOOD PRESSURE: 85 MMHG | WEIGHT: 214 LBS | OXYGEN SATURATION: 95 %

## 2022-09-16 DIAGNOSIS — N30.00 ACUTE CYSTITIS WITHOUT HEMATURIA: Primary | ICD-10-CM

## 2022-09-16 LAB
BACTERIA: ABNORMAL /HPF
BILIRUBIN URINE: NEGATIVE
BLOOD, URINE: ABNORMAL
CLARITY: ABNORMAL
COLOR: ABNORMAL
CRYSTALS, UA: ABNORMAL /HPF
EPITHELIAL CELLS, UA: ABNORMAL /HPF (ref 0–5)
GLUCOSE URINE: NEGATIVE MG/DL
KETONES, URINE: ABNORMAL MG/DL
LEUKOCYTE ESTERASE, URINE: ABNORMAL
NITRITE, URINE: POSITIVE
PH UA: 8 (ref 5–9)
PROTEIN UA: 100 MG/DL
RBC UA: ABNORMAL /HPF (ref 0–5)
SPECIFIC GRAVITY UA: 1.02 (ref 1–1.03)
URINE REFLEX TO CULTURE: YES
UROBILINOGEN, URINE: 1 E.U./DL
WBC UA: >100 /HPF (ref 0–5)

## 2022-09-16 PROCEDURE — 87077 CULTURE AEROBIC IDENTIFY: CPT

## 2022-09-16 PROCEDURE — 81001 URINALYSIS AUTO W/SCOPE: CPT

## 2022-09-16 PROCEDURE — 6370000000 HC RX 637 (ALT 250 FOR IP): Performed by: EMERGENCY MEDICINE

## 2022-09-16 PROCEDURE — 87186 SC STD MICRODIL/AGAR DIL: CPT

## 2022-09-16 PROCEDURE — 87086 URINE CULTURE/COLONY COUNT: CPT

## 2022-09-16 PROCEDURE — 99283 EMERGENCY DEPT VISIT LOW MDM: CPT

## 2022-09-16 RX ORDER — SULFAMETHOXAZOLE AND TRIMETHOPRIM 800; 160 MG/1; MG/1
1 TABLET ORAL ONCE
Status: COMPLETED | OUTPATIENT
Start: 2022-09-16 | End: 2022-09-16

## 2022-09-16 RX ORDER — SULFAMETHOXAZOLE AND TRIMETHOPRIM 800; 160 MG/1; MG/1
1 TABLET ORAL 2 TIMES DAILY
Qty: 14 TABLET | Refills: 0 | Status: SHIPPED | OUTPATIENT
Start: 2022-09-16 | End: 2022-09-23

## 2022-09-16 RX ORDER — PHENAZOPYRIDINE HYDROCHLORIDE 200 MG/1
200 TABLET, FILM COATED ORAL 3 TIMES DAILY PRN
Qty: 6 TABLET | Refills: 0 | Status: SHIPPED | OUTPATIENT
Start: 2022-09-16 | End: 2022-09-19

## 2022-09-16 RX ORDER — PHENAZOPYRIDINE HYDROCHLORIDE 200 MG/1
200 TABLET, FILM COATED ORAL ONCE
Status: COMPLETED | OUTPATIENT
Start: 2022-09-16 | End: 2022-09-16

## 2022-09-16 RX ADMIN — SULFAMETHOXAZOLE AND TRIMETHOPRIM 1 TABLET: 800; 160 TABLET ORAL at 05:57

## 2022-09-16 RX ADMIN — PHENAZOPYRIDINE HYDROCHLORIDE 200 MG: 200 TABLET ORAL at 05:57

## 2022-09-16 ASSESSMENT — ENCOUNTER SYMPTOMS
WHEEZING: 0
PHOTOPHOBIA: 0
COUGH: 0
VOMITING: 0
CHEST TIGHTNESS: 0
SORE THROAT: 0
SHORTNESS OF BREATH: 0
ABDOMINAL DISTENTION: 0
ABDOMINAL PAIN: 0
EYE DISCHARGE: 0

## 2022-09-16 NOTE — ED NOTES
Patient given discharge instructions and prescriptions and verbalized understanding. Vital signs stable. Resp even and unlabored. Skin warm, dry and intact. Patient is alert and oriented. Patient doesn't have any questions at this time.       Guanakito Perez RN  09/16/22 0600

## 2022-09-16 NOTE — ED PROVIDER NOTES
wall 11/05/2016    Bilateral carpal tunnel syndrome 04/23/2018    Bleeding hemorrhoid 03/17/2015    Breast cancer ([facility] [address]     Breast cancer, right ([address] 2003    s/p partial mastectomy, radiation    Carbuncle of abdominal wall 11/05/2016    Ductal carcinoma in situ of breast     right    Fibrocystic disease of right breast     Generalized osteoarthritis 08/04/2014    GERD (gastroesophageal reflux disease)     History of breast cancer in female 08/04/2014    History of colon polyps 05/23/2022    HTN (hypertension) 08/04/2014    Hyperlipidemia     Hypertension     Hypothyroidism 12/02/2016    Insomnia 06/16/2015    Macula lutea degeneration     Moderate persistent asthma 11/25/2020    Moderate persistent asthma 11/25/2020    MRSA (methicillin resistant Staphylococcus aureus) infection     MRSA infection 11/2016    LLQ abdominal wall    Obesity     Postmenopausal 02/09/2017    Pre-diabetes 07/23/2019    Scoliosis     Subclinical hypothyroidism 05/06/2014    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          SURGICALHISTORY       Past Surgical History:   Procedure Laterality Date    ABSCESS DRAINAGE Left 11/2016    Abscess abdominal wall LLQ    BREAST BIOPSY Right 08/10/2017    DR LUIS    BREAST SURGERY N/A 11/06/2016    ABDOMINAL INCISION AND DRAINAGE performed by Mauricio Saint, MD at [address]  04/15/2005    COLONOSCOPY  05/19/2009    DR Romayne Aberdeen    COLONOSCOPY  09/18/2014    diverticulosis, polyps x 3 (DR HATCH)    DILATION AND CURETTAGE OF UTERUS      #1    DILATION AND CURETTAGE OF UTERUS      #2    HERNIA REPAIR      DR Юлия Bess    HIP SURGERY Left 01/02/2018    JOINT REPLACEMENT Left 2006    DR SANCHEZ, hip    LAPAROSCOPY Left 12/15/2021    LAPAROSCOPIC LEFT OOPHORECTOMY AND LEFT ADEXNAL REMOVAL.  performed by Hermelindo Espinoza MD at [facility], PARTIAL Right 2003    OVARY REMOVAL Left 12/15/2021    LAPAROSCOPIC OOPHORECTOMY AND LEFT ADEXNAL MASS REMOVAL (DR FAIR)    TN REVISE MEDIAN N/CARPAL TUNNEL SURG Left 05/03/2018    LEFT WRIST CARPAL TUNNEL RELEASE performed by Darlin Litten, MD at [address] [facility] N/CARPAL TUNNEL SURG Right 05/17/2018    RIGHT WRIST CARPAL TUNNEL RELEASE performed by Darlin Litten, MD at [facility] Bilateral     UPPER GASTROINTESTINAL ENDOSCOPY           CURRENT MEDICATIONS       Previous Medications    ACETAMINOPHEN (TYLENOL) 500 MG TABLET    Take 500 mg by mouth every 6 hours as needed for Pain    ALENDRONATE (FOSAMAX) 70 MG TABLET    Take 1 tablet by mouth every 7 days    DOCUSATE SODIUM (COLACE) 100 MG CAPSULE    Take 1 capsule by mouth 2 times daily as needed for Constipation    EZETIMIBE (ZETIA) 10 MG TABLET    Take 1 tablet by mouth daily    GLYCERIN-POLYSORBATE 80 (REFRESH DRY EYE THERAPY OP)    Apply to eye    HANDICAP PLACARD MISC    by Does not apply route DX: GENERALIZED OSTEOARTHRITIS (M15.9), HISTORY OF LEFT KNEE REPLACEMENT (Z31.916)     EXPIRES: 10/2025    HYDROCORTISONE (ANUSOL-HC) 2.5 % CREA RECTAL CREAM    Place rectally 2 times daily    IPRATROPIUM (ATROVENT) 0.06 % NASAL SPRAY    2 sprays by Nasal route in the morning and 2 sprays at noon and 2 sprays before bedtime. KETOCONAZOLE (NIZORAL) 2 % CREAM    Apply topically twice daily for 2 - 4 weeks (or for 1 week after lesions have healed). LEVOTHYROXINE (SYNTHROID) 50 MCG TABLET    Take 1 tablet by mouth Daily    LOPERAMIDE (IMODIUM) 2 MG CAPSULE    Take 2 mg by mouth 4 times daily as needed for Diarrhea     METOPROLOL SUCCINATE (TOPROL XL) 25 MG EXTENDED RELEASE TABLET    Take 1 tablet by mouth daily    MULTIPLE VITAMIN (MULTI-VITAMIN PO)    Take 1 tablet by mouth daily. MULTIPLE VITAMINS-MINERALS (PRESERVISION AREDS 2 PO)    Take by mouth    NYSTATIN (MYCOSTATIN) 692477 UNIT/GM CREAM    Apply topically in the morning and at bedtime Apply topically 2 times daily.     TRIAMTERENE-HYDROCHLOROTHIAZIDE (DYAZIDE) 37.5-25 MG PER CAPSULE    Take 1 capsule by mouth daily ALLERGIES     Fenofibrate, Statins, and Pcn [penicillins]    FAMILY HISTORY       Family History   Problem Relation Age of Onset    Heart Disease Father     Heart Attack Father     Diabetes Mother           SOCIAL HISTORY       Social History     Socioeconomic History    Marital status:      Spouse name: Mylene Wong    Number of children: 2    Years of education: 12   Occupational History    Occupation: Retired     Comment: former teacher   Tobacco Use    Smoking status: Former     Packs/day: 1.50     Years: 21.00     Pack years: 31.50     Types: Cigarettes     Start date:      Quit date:      Years since quittin.7    Smokeless tobacco: Never   Substance and Sexual Activity    Alcohol use: Yes     Comment: infrequent    Drug use: No    Sexual activity: Never     Partners: Male     Comment: monogamous   Social History Narrative    Living with spouse     Social Determinants of Health     Financial Resource Strain: Low Risk     Difficulty of Paying Living Expenses: Not hard at all   Food Insecurity: No Food Insecurity    Worried About Running Out of Food in the Last Year: Never true    [address] in the Last Year: Never true   Physical Activity: Insufficiently Active    Days of Exercise per Week: 3 days    Minutes of Exercise per Session: 30 min       SCREENINGS    Jackson Coma Scale  Eye Opening: Spontaneous  Best Verbal Response: Oriented  Best Motor Response: Obeys commands  Jackson Coma Scale Score: 15 @FLOW([id number])@      PHYSICAL EXAM    (up to 7 for level 4, 8 or more for level 5)     ED Triage Vitals [22 0445]   BP Temp Temp Source Heart Rate Resp SpO2 Height Weight   -- 97.7 °F (36.5 °C) Oral 91 18 95 % -- 214 lb (97.1 kg)       Physical Exam  Vitals and nursing note reviewed. Constitutional:       Appearance: She is well-developed. HENT:      Head: Normocephalic.       Nose: Nose normal.   Eyes:      Conjunctiva/sclera: Conjunctivae normal.      Pupils: Pupils are equal, round, and reactive to light. Cardiovascular:      Rate and Rhythm: Normal rate and regular rhythm. Heart sounds: Normal heart sounds. Pulmonary:      Effort: Pulmonary effort is normal.      Breath sounds: Normal breath sounds. Abdominal:      General: Bowel sounds are normal.      Palpations: Abdomen is soft. Tenderness: There is no abdominal tenderness. There is no guarding. Musculoskeletal:         General: Normal range of motion. Cervical back: Normal range of motion and neck supple. Skin:     General: Skin is warm and dry. Capillary Refill: Capillary refill takes less than 2 seconds. Neurological:      General: No focal deficit present. Mental Status: She is alert and oriented to person, place, and time. Psychiatric:         Mood and Affect: Mood normal.       DIAGNOSTIC RESULTS     EKG: All EKG's are interpreted by the Emergency Department Physician who either signs or Co-signsthis chart in the absence of a cardiologist.      RADIOLOGY:   Tramaine Allis such as CT, Ultrasound and MRI are read by the radiologist. Plain radiographic images are visualized and preliminarily interpreted by the emergency physician with the below findings:      Interpretation per the Radiologist below, if available at the time ofthis note:    No orders to display         ED BEDSIDE ULTRASOUND:   Performed by ED Physician - none    LABS:  Labs Reviewed   URINALYSIS WITH REFLEX TO CULTURE - Abnormal; Notable for the following components:       Result Value    Color, UA ORANGE (*)     Clarity, UA TURBID (*)     Ketones, Urine TRACE (*)     Blood, Urine LARGE (*)     Protein,  (*)     Nitrite, Urine POSITIVE (*)     Leukocyte Esterase, Urine LARGE (*)     All other components within normal limits   MICROSCOPIC URINALYSIS       All other labs were within normal range or not returned as of this dictation.     EMERGENCY DEPARTMENT COURSE and DIFFERENTIAL DIAGNOSIS/MDM:   Vitals:    Vitals: 09/16/22 0445   BP: 133/85   Pulse: 91   Resp: 18   Temp: 97.7 °F (36.5 °C)   TempSrc: Oral   SpO2: 95%   Weight: 214 lb (97.1 kg)        MDM    Patient has a urinary tract infection. Discharged home with antibiotics and Pyridium. Return for concerns. CONSULTS:  None    PROCEDURES:  Unless otherwise noted below, none     Procedures    FINAL IMPRESSION      1.  Acute cystitis without hematuria          DISPOSITION/PLAN   DISPOSITION Decision To Discharge 09/16/2022 05:51:24 AM      PATIENT REFERRED TO:  Sherine Carvajal MD  [facility]  [phone]    In 3 days      DISCHARGE MEDICATIONS:  New Prescriptions    PHENAZOPYRIDINE (PYRIDIUM) 200 MG TABLET    Take 1 tablet by mouth 3 times daily as needed for Pain (bladder spasm/pain)    SULFAMETHOXAZOLE-TRIMETHOPRIM (BACTRIM DS) 800-160 MG PER TABLET    Take 1 tablet by mouth 2 times daily for 7 days          (Please note that portions of this note were completed with a voice recognition program.  Efforts were made to edit the dictations but occasionally words are mis-transcribed.)    Malini Sims MD (electronically signed)  Attending Emergency Physician         Malini Sims MD  09/16/22 8366

## 2022-09-18 LAB
ORGANISM: ABNORMAL
URINE CULTURE, ROUTINE: ABNORMAL
URINE CULTURE, ROUTINE: ABNORMAL

## 2022-09-19 DIAGNOSIS — I10 ESSENTIAL HYPERTENSION: Chronic | ICD-10-CM

## 2022-09-19 DIAGNOSIS — E03.9 HYPOTHYROIDISM, UNSPECIFIED TYPE: Chronic | ICD-10-CM

## 2022-09-19 NOTE — TELEPHONE ENCOUNTER
Pharmacy is requesting medication refill.  Please approve or deny this request.    Rx requested:  Requested Prescriptions     Pending Prescriptions Disp Refills    metoprolol succinate (TOPROL XL) 25 MG extended release tablet [Pharmacy Med Name: METOPROLOL SUCCINATE ER TABS 25MG] 90 tablet 3     Sig: Take 1 tablet by mouth daily    levothyroxine (SYNTHROID) 50 MCG tablet [Pharmacy Med Name: L-THYROXINE (SYNTHROID) TABS 50MCG] 90 tablet 3     Sig: Take 1 tablet by mouth Daily    triamterene-hydroCHLOROthiazide (DYAZIDE) 37.5-25 MG per capsule [Pharmacy Med Name: TRIAMTERENE/HCTZ CAPS 37.5/25MG] 90 capsule 3     Sig: Take 1 capsule by mouth daily         Last Office Visit:   8/8/2022      Next Visit Date:  Future Appointments   Date Time Provider Ariadna Bright   11/22/2022 11:00 AM MD Girish Blanco [address]

## 2022-09-21 RX ORDER — LEVOTHYROXINE SODIUM 0.05 MG/1
50 TABLET ORAL DAILY
Qty: 90 TABLET | Refills: 1 | Status: SHIPPED | OUTPATIENT
Start: 2022-09-21

## 2022-09-21 RX ORDER — TRIAMTERENE AND HYDROCHLOROTHIAZIDE 37.5; 25 MG/1; MG/1
1 CAPSULE ORAL DAILY
Qty: 90 CAPSULE | Refills: 1 | Status: SHIPPED | OUTPATIENT
Start: 2022-09-21

## 2022-09-21 RX ORDER — METOPROLOL SUCCINATE 25 MG/1
25 TABLET, EXTENDED RELEASE ORAL DAILY
Qty: 90 TABLET | Refills: 1 | Status: SHIPPED | OUTPATIENT
Start: 2022-09-21

## 2022-12-11 DIAGNOSIS — E78.2 MIXED HYPERLIPIDEMIA: Chronic | ICD-10-CM

## 2022-12-12 RX ORDER — EZETIMIBE 10 MG/1
TABLET ORAL
Qty: 90 TABLET | Refills: 1 | Status: SHIPPED | OUTPATIENT
Start: 2022-12-12

## 2022-12-12 NOTE — TELEPHONE ENCOUNTER
E advice sent to the patient with the results   Future Appointments    Encounter Information    Provider Department Appt Notes   1/9/2023 Joshua Calderon MD [facility] Primary Care medication f/u       Past Visits    Date Provider Specialty Visit Type Primary Dx   08/08/2022 Joshua Calderon MD Family Medicine Office Visit External hemorrhoids

## 2022-12-13 ENCOUNTER — OFFICE VISIT (OUTPATIENT)
Dept: FAMILY MEDICINE CLINIC | Age: 82
End: 2022-12-13
Payer: MEDICARE

## 2022-12-13 VITALS
HEART RATE: 100 BPM | OXYGEN SATURATION: 96 % | WEIGHT: 215.6 LBS | HEIGHT: 60 IN | DIASTOLIC BLOOD PRESSURE: 82 MMHG | SYSTOLIC BLOOD PRESSURE: 138 MMHG | BODY MASS INDEX: 42.33 KG/M2 | TEMPERATURE: 97.8 F

## 2022-12-13 DIAGNOSIS — U07.1 COVID-19: Primary | ICD-10-CM

## 2022-12-13 PROCEDURE — 3074F SYST BP LT 130 MM HG: CPT

## 2022-12-13 PROCEDURE — 1123F ACP DISCUSS/DSCN MKR DOCD: CPT

## 2022-12-13 PROCEDURE — 99213 OFFICE O/P EST LOW 20 MIN: CPT

## 2022-12-13 PROCEDURE — 3078F DIAST BP <80 MM HG: CPT

## 2022-12-13 RX ORDER — BENZONATATE 200 MG/1
200 CAPSULE ORAL 3 TIMES DAILY PRN
Qty: 21 CAPSULE | Refills: 0 | Status: SHIPPED | OUTPATIENT
Start: 2022-12-13 | End: 2022-12-20

## 2022-12-13 RX ORDER — OXYMETAZOLINE HYDROCHLORIDE 0.05 G/100ML
2 SPRAY NASAL 2 TIMES DAILY
Qty: 1 ML | Refills: 0 | Status: SHIPPED | OUTPATIENT
Start: 2022-12-13 | End: 2022-12-16

## 2022-12-13 ASSESSMENT — ENCOUNTER SYMPTOMS
RHINORRHEA: 0
CHEST TIGHTNESS: 0
EYES NEGATIVE: 1
DIARRHEA: 0
SORE THROAT: 0
COUGH: 1
VOMITING: 0
SHORTNESS OF BREATH: 0
SINUS PRESSURE: 1
WHEEZING: 0
NAUSEA: 0
ABDOMINAL PAIN: 0

## 2022-12-13 NOTE — PROGRESS NOTES
[facility] Encounter        ASSESSMENT/PLAN     Dina Carvalho is a 80 y.o. female who presents with:  Productive cough starting 4 days ago per patient. Home COVID test today is positive. On examination patient's neck is supple and there are no masses. Lung sounds are clear throughout heart sounds normal and regular. 1. COVID-19      Orders placed for Paxlovid for the treatment of COVID-19. Oxymetazoline 12-hour nasal spray for treatment of sinus congestion and Tessalon for cough. Advised patient to monitor for worsening symptoms of shortness of breath with activity and return if she notices for reevaluation. PATIENT REFERRED TO:  Return if symptoms worsen or fail to improve. DISCHARGE MEDICATIONS:  New Prescriptions    BENZONATATE (TESSALON) 200 MG CAPSULE    Take 1 capsule by mouth 3 times daily as needed for Cough    NIRMATRELVIR/RITONAVIR (PAXLOVID) 20 X 150 MG & 10 X 100MG TBPK    Take 3 tablets (two 150 mg nirmatrelvir and one 100 mg ritonavir tablets) by mouth every 12 hours for 5 days. OXYMETAZOLINE (12 HOUR NASAL SPRAY) 0.05 % NASAL SPRAY    2 sprays by Nasal route 2 times daily for 3 days     Cannot display discharge medications since this is not an admission. Geovanna Pompa, APRN - CNP    CHIEF COMPLAINT       Chief Complaint   Patient presents with    Positive For Covid-19     Pt has been having a dry cough, stuffy nose, raspy voice, x1 week pt tested at home for covid positive          SUBJECTIVE/REVIEW OF SYSTEMS     Review of Systems   Constitutional:  Positive for fatigue. Negative for chills and fever. HENT:  Positive for congestion and sinus pressure. Negative for ear pain, hearing loss, rhinorrhea and sore throat. Eyes: Negative. Respiratory:  Positive for cough. Negative for chest tightness, shortness of breath and wheezing. Cardiovascular:  Negative for chest pain and palpitations.    Gastrointestinal:  Negative for abdominal pain, diarrhea, nausea and vomiting. Endocrine: Negative. Musculoskeletal:  Negative for arthralgias and myalgias. Skin:  Negative for rash. Neurological:  Negative for dizziness, weakness, light-headedness and headaches. Hematological:  Negative for adenopathy. Psychiatric/Behavioral: Negative. OBJECTIVE/PHYSICAL EXAM     Physical Exam  Vitals reviewed. Constitutional:       Appearance: Normal appearance. She is not ill-appearing or toxic-appearing. HENT:      Head: Normocephalic. Right Ear: Tympanic membrane, ear canal and external ear normal. No middle ear effusion. There is no impacted cerumen. No mastoid tenderness. Tympanic membrane is not perforated, erythematous or bulging. Left Ear: Tympanic membrane, ear canal and external ear normal.  No middle ear effusion. There is no impacted cerumen. No mastoid tenderness. Tympanic membrane is not perforated, erythematous or bulging. Nose: Congestion present. No rhinorrhea. Mouth/Throat:      Mouth: Mucous membranes are moist.      Pharynx: Oropharynx is clear. No pharyngeal swelling, oropharyngeal exudate or posterior oropharyngeal erythema. Tonsils: No tonsillar exudate or tonsillar abscesses. 0 on the right. 0 on the left. Eyes:      General:         Right eye: No discharge. Left eye: No discharge. Cardiovascular:      Rate and Rhythm: Normal rate and regular rhythm. Pulses: Normal pulses. Heart sounds: Normal heart sounds. No murmur heard. No gallop. Pulmonary:      Effort: Pulmonary effort is normal. No respiratory distress. Breath sounds: Normal breath sounds. No stridor. No wheezing or rhonchi. Chest:      Chest wall: No tenderness. Abdominal:      General: Abdomen is flat. Musculoskeletal:      Cervical back: No rigidity or tenderness. Lymphadenopathy:      Head:      Right side of head: No submandibular adenopathy.       Left side of head: No submandibular adenopathy. Cervical: No cervical adenopathy. Skin:     General: Skin is warm and dry. Capillary Refill: Capillary refill takes less than 2 seconds. Coloration: Skin is not pale. Neurological:      Mental Status: She is alert and oriented to person, place, and time. Mental status is at baseline. Psychiatric:         Mood and Affect: Mood normal.         Behavior: Behavior normal.       VITALS  BP: 138/82, Temp: 97.8 °F (36.6 °C), Temp Source: Temporal, Heart Rate: 100,  , SpO2: 96 %      PAST MEDICAL HISTORY         Diagnosis Date    Abnormal ultrasound of breast     Abscess of abdominal wall 11/05/2016    Bilateral carpal tunnel syndrome 04/23/2018    Bleeding hemorrhoid 03/17/2015    Breast cancer ([address]     Breast cancer, right ([address] 2003    s/p partial mastectomy, radiation    Carbuncle of abdominal wall 11/05/2016    Ductal carcinoma in situ of breast     right    Fibrocystic disease of right breast     Generalized osteoarthritis 08/04/2014    GERD (gastroesophageal reflux disease)     History of breast cancer in female 08/04/2014    History of colon polyps 05/23/2022    HTN (hypertension) 08/04/2014    Hyperlipidemia     Hypertension     Hypothyroidism 12/02/2016    Insomnia 06/16/2015    Macula lutea degeneration     Moderate persistent asthma 11/25/2020    Moderate persistent asthma 11/25/2020    MRSA (methicillin resistant Staphylococcus aureus) infection     MRSA infection 11/2016    LLQ abdominal wall    Obesity     Postmenopausal 02/09/2017    Pre-diabetes 07/23/2019    Scoliosis     Subclinical hypothyroidism 05/06/2014    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      SURGICAL HISTORY     Patient  has a past surgical history that includes Dilation and curettage of uterus; Colonoscopy (04/15/2005); hernia repair; Upper gastrointestinal endoscopy; Colonoscopy (05/19/2009); Mastectomy, partial (Right, 2003); Colonoscopy (09/18/2014); Breast surgery (N/A, 11/06/2016);  Abscess Drainage (Left, 11/2016); Breast biopsy (Right, 08/10/2017); hip surgery (Left, 01/02/2018); joint replacement (Left, 2006); pr revise median n/carpal tunnel surg (Left, 05/03/2018); Dilation and curettage of uterus; pr revise median n/carpal tunnel surg (Right, 05/17/2018); Total knee arthroplasty (Bilateral); laparoscopy (Left, 12/15/2021); and Ovary removal (Left, 12/15/2021). CURRENT MEDICATIONS       Previous Medications    ACETAMINOPHEN (TYLENOL) 500 MG TABLET    Take 500 mg by mouth every 6 hours as needed for Pain    ALENDRONATE (FOSAMAX) 70 MG TABLET    Take 1 tablet by mouth every 7 days    DOCUSATE SODIUM (COLACE) 100 MG CAPSULE    Take 1 capsule by mouth 2 times daily as needed for Constipation    EZETIMIBE (ZETIA) 10 MG TABLET    take 1 tablet by mouth once daily    GLYCERIN-POLYSORBATE 80 (REFRESH DRY EYE THERAPY OP)    Apply to eye    HANDICAP PLACARD MISC    by Does not apply route DX: GENERALIZED OSTEOARTHRITIS (M15.9), HISTORY OF LEFT KNEE REPLACEMENT (C90.463)     EXPIRES: 10/2025    HYDROCORTISONE (ANUSOL-HC) 2.5 % CREA RECTAL CREAM    Place rectally 2 times daily    IPRATROPIUM (ATROVENT) 0.06 % NASAL SPRAY    2 sprays by Nasal route in the morning and 2 sprays at noon and 2 sprays before bedtime. KETOCONAZOLE (NIZORAL) 2 % CREAM    Apply topically twice daily for 2 - 4 weeks (or for 1 week after lesions have healed). LEVOTHYROXINE (SYNTHROID) 50 MCG TABLET    Take 1 tablet by mouth Daily    LOPERAMIDE (IMODIUM) 2 MG CAPSULE    Take 2 mg by mouth 4 times daily as needed for Diarrhea     METOPROLOL SUCCINATE (TOPROL XL) 25 MG EXTENDED RELEASE TABLET    Take 1 tablet by mouth daily    MULTIPLE VITAMIN (MULTI-VITAMIN PO)    Take 1 tablet by mouth daily. MULTIPLE VITAMINS-MINERALS (PRESERVISION AREDS 2 PO)    Take by mouth    NYSTATIN (MYCOSTATIN) 487003 UNIT/GM CREAM    Apply topically in the morning and at bedtime Apply topically 2 times daily.     TRIAMTERENE-HYDROCHLOROTHIAZIDE (DYAZIDE) 37.5-25 MG PER CAPSULE    Take 1 capsule by mouth daily     ALLERGIES     Patient is is allergic to fenofibrate, statins, and pcn [penicillins]. FAMILY HISTORY     Patient'sfamily history includes Diabetes in her mother; Heart Attack in her father; Heart Disease in her father. HISTORY     Patient  reports that she quit smoking about 42 years ago. Her smoking use included cigarettes. She started smoking about 63 years ago. She has a 31.50 pack-year smoking history. She has never used smokeless tobacco. She reports current alcohol use. She reports that she does not use drugs. READY CARE COURSE   No orders of the defined types were placed in this encounter. Labs:  No results found for this visit on 12/13/22. IMAGING:  No orders to display     Scheduled Meds:  Continuous Infusions:  PRN Meds:.

## 2023-01-06 DIAGNOSIS — R21 RASH AND OTHER NONSPECIFIC SKIN ERUPTION: ICD-10-CM

## 2023-01-06 RX ORDER — KETOCONAZOLE 20 MG/G
CREAM TOPICAL
Qty: 60 G | Refills: 0 | Status: SHIPPED | OUTPATIENT
Start: 2023-01-06

## 2023-01-06 NOTE — TELEPHONE ENCOUNTER
Patient is requesting medication refill. Please approve or deny this request.    Rx requested:  Requested Prescriptions     Pending Prescriptions Disp Refills    ketoconazole (NIZORAL) 2 % cream 60 g 0     Sig: Apply topically twice daily for 2 - 4 weeks (or for 1 week after lesions have healed).          Last Office Visit:   8/8/2022      Next Visit Date:  Future Appointments   Date Time Provider Ariadna Bright   1/9/2023 11:30 AM MD Girish Castellon [address]

## 2023-01-09 ENCOUNTER — HOSPITAL ENCOUNTER (OUTPATIENT)
Dept: LAB | Age: 83
Discharge: HOME | End: 2023-01-09
Payer: MEDICARE

## 2023-01-09 ENCOUNTER — OFFICE VISIT (OUTPATIENT)
Dept: FAMILY MEDICINE CLINIC | Age: 83
End: 2023-01-09

## 2023-01-09 VITALS
BODY MASS INDEX: 42.13 KG/M2 | HEART RATE: 98 BPM | RESPIRATION RATE: 18 BRPM | WEIGHT: 214.6 LBS | HEIGHT: 60 IN | SYSTOLIC BLOOD PRESSURE: 130 MMHG | DIASTOLIC BLOOD PRESSURE: 68 MMHG | TEMPERATURE: 98 F | OXYGEN SATURATION: 95 %

## 2023-01-09 DIAGNOSIS — E03.9 HYPOTHYROIDISM, UNSPECIFIED TYPE: Chronic | ICD-10-CM

## 2023-01-09 DIAGNOSIS — E66.01 OBESITY, CLASS III, BMI 40-49.9 (MORBID OBESITY) (HCC): ICD-10-CM

## 2023-01-09 DIAGNOSIS — B37.2 CANDIDIASIS, INTERTRIGO: ICD-10-CM

## 2023-01-09 DIAGNOSIS — I10 PRIMARY HYPERTENSION: Primary | Chronic | ICD-10-CM

## 2023-01-09 DIAGNOSIS — I10 PRIMARY HYPERTENSION: Chronic | ICD-10-CM

## 2023-01-09 LAB
ALBUMIN SERPL-MCNC: 4.1 G/DL (ref 3.5–4.6)
ALP BLD-CCNC: 83 U/L (ref 40–130)
ALT SERPL-CCNC: 18 U/L (ref 0–33)
ANION GAP SERPL CALCULATED.3IONS-SCNC: 12 MEQ/L (ref 9–15)
AST SERPL-CCNC: 20 U/L (ref 0–35)
BASOPHILS ABSOLUTE: 0 K/UL (ref 0–0.2)
BASOPHILS RELATIVE PERCENT: 0.5 %
BILIRUB SERPL-MCNC: 0.3 MG/DL (ref 0.2–0.7)
BUN BLDV-MCNC: 19 MG/DL (ref 8–23)
CALCIUM SERPL-MCNC: 10.4 MG/DL (ref 8.5–9.9)
CHLORIDE BLD-SCNC: 99 MEQ/L (ref 95–107)
CO2: 30 MEQ/L (ref 20–31)
CREAT SERPL-MCNC: 0.73 MG/DL (ref 0.5–0.9)
EOSINOPHILS ABSOLUTE: 0.1 K/UL (ref 0–0.7)
EOSINOPHILS RELATIVE PERCENT: 1.6 %
GFR SERPL CREATININE-BSD FRML MDRD: >60 ML/MIN/{1.73_M2}
GLOBULIN: 2.9 G/DL (ref 2.3–3.5)
GLUCOSE BLD-MCNC: 101 MG/DL (ref 70–99)
HCT VFR BLD CALC: 40.8 % (ref 37–47)
HEMOGLOBIN: 13.3 G/DL (ref 12–16)
LYMPHOCYTES ABSOLUTE: 1.1 K/UL (ref 1–4.8)
LYMPHOCYTES RELATIVE PERCENT: 14 %
MCH RBC QN AUTO: 28 PG (ref 27–31.3)
MCHC RBC AUTO-ENTMCNC: 32.6 % (ref 33–37)
MCV RBC AUTO: 85.9 FL (ref 79.4–94.8)
MONOCYTES ABSOLUTE: 0.9 K/UL (ref 0.2–0.8)
MONOCYTES RELATIVE PERCENT: 11.3 %
NEUTROPHILS ABSOLUTE: 5.9 K/UL (ref 1.4–6.5)
NEUTROPHILS RELATIVE PERCENT: 72.6 %
PDW BLD-RTO: 14.6 % (ref 11.5–14.5)
PLATELET # BLD: 267 K/UL (ref 130–400)
POTASSIUM SERPL-SCNC: 4.2 MEQ/L (ref 3.4–4.9)
RBC # BLD: 4.75 M/UL (ref 4.2–5.4)
SODIUM BLD-SCNC: 141 MEQ/L (ref 135–144)
T4 FREE: 1.31 NG/DL (ref 0.84–1.68)
TOTAL PROTEIN: 7 G/DL (ref 6.3–8)
TSH SERPL DL<=0.05 MIU/L-ACNC: 5.46 UIU/ML (ref 0.44–3.86)
WBC # BLD: 8.2 K/UL (ref 4.8–10.8)

## 2023-01-09 PROCEDURE — 84443 ASSAY THYROID STIM HORMONE: CPT

## 2023-01-09 PROCEDURE — 80053 COMPREHEN METABOLIC PANEL: CPT

## 2023-01-09 PROCEDURE — 36415 COLL VENOUS BLD VENIPUNCTURE: CPT

## 2023-01-09 PROCEDURE — 85025 COMPLETE CBC W/AUTO DIFF WBC: CPT

## 2023-01-09 PROCEDURE — 84439 ASSAY OF FREE THYROXINE: CPT

## 2023-01-09 RX ORDER — LORATADINE 10 MG/1
10 TABLET ORAL DAILY
Qty: 30 TABLET | Refills: 0 | Status: SHIPPED | OUTPATIENT
Start: 2023-01-09 | End: 2023-02-08

## 2023-01-09 ASSESSMENT — PATIENT HEALTH QUESTIONNAIRE - PHQ9
5. POOR APPETITE OR OVEREATING: 0
2. FEELING DOWN, DEPRESSED OR HOPELESS: 0
SUM OF ALL RESPONSES TO PHQ QUESTIONS 1-9: 0
10. IF YOU CHECKED OFF ANY PROBLEMS, HOW DIFFICULT HAVE THESE PROBLEMS MADE IT FOR YOU TO DO YOUR WORK, TAKE CARE OF THINGS AT HOME, OR GET ALONG WITH OTHER PEOPLE: 0
1. LITTLE INTEREST OR PLEASURE IN DOING THINGS: 0
7. TROUBLE CONCENTRATING ON THINGS, SUCH AS READING THE NEWSPAPER OR WATCHING TELEVISION: 0
SUM OF ALL RESPONSES TO PHQ9 QUESTIONS 1 & 2: 0
3. TROUBLE FALLING OR STAYING ASLEEP: 0
9. THOUGHTS THAT YOU WOULD BE BETTER OFF DEAD, OR OF HURTING YOURSELF: 0
8. MOVING OR SPEAKING SO SLOWLY THAT OTHER PEOPLE COULD HAVE NOTICED. OR THE OPPOSITE, BEING SO FIGETY OR RESTLESS THAT YOU HAVE BEEN MOVING AROUND A LOT MORE THAN USUAL: 0
SUM OF ALL RESPONSES TO PHQ QUESTIONS 1-9: 0
4. FEELING TIRED OR HAVING LITTLE ENERGY: 0
SUM OF ALL RESPONSES TO PHQ QUESTIONS 1-9: 0
SUM OF ALL RESPONSES TO PHQ QUESTIONS 1-9: 0
6. FEELING BAD ABOUT YOURSELF - OR THAT YOU ARE A FAILURE OR HAVE LET YOURSELF OR YOUR FAMILY DOWN: 0

## 2023-01-09 ASSESSMENT — COLUMBIA-SUICIDE SEVERITY RATING SCALE - C-SSRS
6. HAVE YOU EVER DONE ANYTHING, STARTED TO DO ANYTHING, OR PREPARED TO DO ANYTHING TO END YOUR LIFE?: NO
2. HAVE YOU ACTUALLY HAD ANY THOUGHTS OF KILLING YOURSELF?: NO
1. WITHIN THE PAST MONTH, HAVE YOU WISHED YOU WERE DEAD OR WISHED YOU COULD GO TO SLEEP AND NOT WAKE UP?: NO

## 2023-01-09 ASSESSMENT — ENCOUNTER SYMPTOMS
CHEST TIGHTNESS: 0
NAUSEA: 0
COUGH: 0
DIARRHEA: 0
SHORTNESS OF BREATH: 0
COLOR CHANGE: 0

## 2023-01-09 NOTE — PROGRESS NOTES
2023    Aman Read (:  1940) is a 80 y.o. female, here for a preventive medicine evaluation. Patient Active Problem List   Diagnosis    Hyperlipidemia    GERD (gastroesophageal reflux disease)    Generalized osteoarthritis    HTN (hypertension)    History of breast cancer in female    Insomnia    Constipation    Hypothyroidism    Postmenopausal    Fibrocystic disease of right breast    Bilateral carpal tunnel syndrome    Pre-diabetes    Allergy status to penicillin    Presence of left artificial knee joint    Scoliosis, unspecified    Morbidly obese (HCC)    Moderate persistent asthma    Hypercalcemia    Ovarian mass, left    History of colon polyps    Age-related osteoporosis without current pathological fracture       Review of Systems    Prior to Visit Medications    Medication Sig Taking? Authorizing Provider   ketoconazole (NIZORAL) 2 % cream Apply topically twice daily for 2 - 4 weeks (or for 1 week after lesions have healed). Yes Vega Jackson MD   ezetimibe (ZETIA) 10 MG tablet take 1 tablet by mouth once daily Yes Veag Jackson MD   metoprolol succinate (TOPROL XL) 25 MG extended release tablet Take 1 tablet by mouth daily Yes Vega Jackson MD   levothyroxine (SYNTHROID) 50 MCG tablet Take 1 tablet by mouth Daily Yes Vega Jackson MD   triamterene-hydroCHLOROthiazide (DYAZIDE) 37.5-25 MG per capsule Take 1 capsule by mouth daily Yes Vega Jackson MD   alendronate (FOSAMAX) 70 MG tablet Take 1 tablet by mouth every 7 days Yes Vega Jackson MD   ipratropium (ATROVENT) 0.06 % nasal spray 2 sprays by Nasal route in the morning and 2 sprays at noon and 2 sprays before bedtime. Yes Vega Jackson MD   nystatin (MYCOSTATIN) 668336 UNIT/GM cream Apply topically in the morning and at bedtime Apply topically 2 times daily.  Yes Vega Jackson MD   hydrocortisone (ANUSOL-HC) 2.5 % CREA rectal cream Place rectally 2 times daily Yes Kalin Wasserman PA-C Glycerin-Polysorbate 80 (REFRESH DRY EYE THERAPY OP) Apply to eye Yes Historical Provider, MD   docusate sodium (COLACE) 100 MG capsule Take 1 capsule by mouth 2 times daily as needed for Constipation Yes Herrera Disla MD   Multiple Vitamins-Minerals (PRESERVISION AREDS 2 PO) Take by mouth Yes Historical Provider, MD   Handicap Placard [address] [facility] by Does not apply route DX: GENERALIZED OSTEOARTHRITIS (M15.9), HISTORY OF LEFT KNEE REPLACEMENT (G76.734)     EXPIRES: 10/2025 Yes Migel Simental MD   loperamide (IMODIUM) 2 MG capsule Take 2 mg by mouth 4 times daily as needed for Diarrhea  Yes Historical Provider, MD   acetaminophen (TYLENOL) 500 MG tablet Take 500 mg by mouth every 6 hours as needed for Pain Yes Historical Provider, MD   Multiple Vitamin (MULTI-VITAMIN PO) Take 1 tablet by mouth daily.  Yes Historical Provider, MD        Allergies   Allergen Reactions    Fenofibrate Other (See Comments)     myalgias    Statins Other (See Comments)     myalgias    Pcn [Penicillins] Rash       Past Medical History:   Diagnosis Date    Abnormal ultrasound of breast     Abscess of abdominal wall 11/05/2016    Bilateral carpal tunnel syndrome 04/23/2018    Bleeding hemorrhoid 03/17/2015    Breast cancer ([address]     Breast cancer, right ([address] 2003    s/p partial mastectomy, radiation    Carbuncle of abdominal wall 11/05/2016    Ductal carcinoma in situ of breast     right    Fibrocystic disease of right breast     Generalized osteoarthritis 08/04/2014    GERD (gastroesophageal reflux disease)     History of breast cancer in female 08/04/2014    History of colon polyps 05/23/2022    HTN (hypertension) 08/04/2014    Hyperlipidemia     Hypertension     Hypothyroidism 12/02/2016    Insomnia 06/16/2015    Macula lutea degeneration     Moderate persistent asthma 11/25/2020    Moderate persistent asthma 11/25/2020    MRSA (methicillin resistant Staphylococcus aureus) infection     MRSA infection 11/2016    LLQ abdominal wall    Obesity Postmenopausal 2017    Pre-diabetes 2019    Scoliosis     Subclinical hypothyroidism 2014    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        Past Surgical History:   Procedure Laterality Date    ABSCESS DRAINAGE Left 2016    Abscess abdominal wall LLQ    BREAST BIOPSY Right 08/10/2017    DR LUIS    BREAST SURGERY N/A 2016    ABDOMINAL INCISION AND DRAINAGE performed by Aaliyah Sheikh MD at [address] [facility]  04/15/2005    COLONOSCOPY  2009    DR John Payne    COLONOSCOPY  2014    diverticulosis, polyps x 3 (DR HATCH)    DILATION AND CURETTAGE OF UTERUS      #1    DILATION AND CURETTAGE OF UTERUS      #2    [facility]    HIP SURGERY Left 2018    JOINT REPLACEMENT Left     DR SANCHEZ, hip    LAPAROSCOPY Left 12/15/2021    LAPAROSCOPIC LEFT OOPHORECTOMY AND LEFT ADEXNAL REMOVAL. performed by Julio Marcelo MD at [place] [address], PARTIAL Right 2003    OVARY REMOVAL Left 12/15/2021    LAPAROSCOPIC OOPHORECTOMY AND LEFT ADEXNAL MASS REMOVAL (DR FAIR)    [place] NEUROPLASTY &/TRANSPOS MEDIAN NRV CARPAL TUNNE Left 2018    LEFT WRIST CARPAL TUNNEL RELEASE performed by Heidi Saleem MD at [address] [facility] &/TRANSPOS MEDIAN NRV CARPAL Sherice Cochran Right 2018    RIGHT WRIST CARPAL TUNNEL RELEASE performed by Heidi Saleem MD at [address] Bilateral     UPPER GASTROINTESTINAL ENDOSCOPY         Social History     Socioeconomic History    Marital status:      Spouse name: Steffen So    Number of children: 2    Years of education: 16    Highest education level: Not on file   Occupational History    Occupation: Retired     Comment: former teacher   Tobacco Use    Smoking status: Former     Packs/day: 1.50     Years: 21.00     Pack years: 31.50     Types: Cigarettes     Start date:      Quit date: 1980     Years since quittin.0    Smokeless tobacco: Never   Substance and Sexual Activity    Alcohol use:  Yes Comment: infrequent    Drug use: No    Sexual activity: Never     Partners: Male     Comment: monogamous   Other Topics Concern    Not on file   Social History Narrative    Living with spouse     Social Determinants of Health     Financial Resource Strain: Low Risk     Difficulty of Paying Living Expenses: Not hard at all   Food Insecurity: No Food Insecurity    Worried About Running Out of Food in the Last Year: Never true    Ran Out of Food in the Last Year: Never true   Transportation Needs: Not on file   Physical Activity: Insufficiently Active    Days of Exercise per Week: 3 days    Minutes of Exercise per Session: 30 min   Stress: Not on file   Social Connections: Not on file   Intimate Partner Violence: Not on file   Housing Stability: Not on file        Family History   Problem Relation Age of Onset    Heart Disease Father     Heart Attack Father     Diabetes Mother        ADVANCE DIRECTIVE: N, <no information>    Vitals:    01/09/23 1238   BP: 130/68   Site: Right Upper Arm   Position: Sitting   Cuff Size: Large Adult   Pulse: 98   Resp: 18   Temp: 98 °F (36.7 °C)   TempSrc: Temporal   SpO2: 95%   Weight: 214 lb 9.6 oz (97.3 kg)   Height: 5' (1.524 m)     Estimated body mass index is 41.91 kg/m² as calculated from the following:    Height as of this encounter: 5' (1.524 m). Weight as of this encounter: 214 lb 9.6 oz (97.3 kg). Physical Exam    No flowsheet data found.     Lab Results   Component Value Date/Time    CHOL 203 08/12/2022 10:35 AM    CHOL 210 05/06/2021 11:07 AM    CHOL 211 10/15/2020 04:01 PM    TRIG 253 08/12/2022 10:35 AM    TRIG 230 05/06/2021 11:07 AM    TRIG 288 10/15/2020 04:01 PM    HDL 45 08/12/2022 10:35 AM    HDL 45 05/06/2021 11:07 AM    HDL 41 10/15/2020 04:01 PM    LDLCALC 107 08/12/2022 10:35 AM    LDLCALC 119 05/06/2021 11:07 AM    LDLCALC 112 10/15/2020 04:01 PM    GLUCOSE 85 08/12/2022 10:35 AM    GLUCOSE 96 12/01/2011 09:45 AM    LABA1C 5.6 08/12/2022 10:35 AM LABA1C 5.8 04/30/2021 01:53 PM    LABA1C 6.1 10/15/2020 04:01 PM       The ASCVD Risk score (Mike DK, et al., 2019) failed to calculate for the following reasons: The 2019 ASCVD risk score is only valid for ages 36 to 78    Immunization History   Administered Date(s) Administered    COVID-19, PFIZER PURPLE top, DILUTE for use, (age 15 y+), 30mcg/0.3mL 01/23/2021, 02/13/2021, 11/19/2021    Influenza, FLUAD, (age 72 y+), Adjuvanted, 0.5mL 10/01/2020, 10/26/2021    Influenza, High Dose (Fluzone 65 yrs and older) 10/06/2015, 12/23/2016, 12/18/2017, 10/08/2018    Pneumococcal Conjugate 13-valent ([id number]) 12/23/2016    Pneumococcal Polysaccharide (Qazprgdhb20) 05/17/2011    Tdap (Boostrix, Adacel) 12/18/2017    Zoster Recombinant (Shingrix) 11/06/2020       Health Maintenance   Topic Date Due    Colorectal Cancer Screen  09/18/2019    Shingles vaccine (2 of 2) 01/01/2021    COVID-19 Vaccine (4 - Booster for Pfizer series) 01/14/2022    Annual Wellness Visit (AWV)  05/24/2023    Depression Screen  08/08/2023    DTaP/Tdap/Td vaccine (2 - Td or Tdap) 12/18/2027    DEXA (modify frequency per FRAX score)  Completed    Flu vaccine  Completed    Pneumococcal 65+ years Vaccine  Completed    Hepatitis A vaccine  Aged Out    Hib vaccine  Aged Out    Meningococcal (ACWY) vaccine  Aged Out       Assessment & Plan   {There are no diagnoses linked to this encounter. (Refresh or delete this SmartLink)}  No follow-ups on file.          --Javed Arnold, APRN - CNP

## 2023-01-09 NOTE — PATIENT INSTRUCTIONS
Continue with Ketoconazole cream for abdominal Abdominal rash. Reasonable goal for weight loss would be 30 pounds over the next six months bringing your weight to around 185lb.

## 2023-01-09 NOTE — PROGRESS NOTES
2023    Phuong Read (:  1940) is a 80 y.o. female, here for a preventive medicine evaluation. Patient Active Problem List   Diagnosis    Hyperlipidemia    GERD (gastroesophageal reflux disease)    Generalized osteoarthritis    HTN (hypertension)    History of breast cancer in female    Insomnia    Constipation    Hypothyroidism    Postmenopausal    Fibrocystic disease of right breast    Bilateral carpal tunnel syndrome    Pre-diabetes    Allergy status to penicillin    Presence of left artificial knee joint    Scoliosis, unspecified    Morbidly obese (HCC)    Moderate persistent asthma    Hypercalcemia    Ovarian mass, left    History of colon polyps    Age-related osteoporosis without current pathological fracture       Review of Systems    Prior to Visit Medications    Medication Sig Taking? Authorizing Provider   ketoconazole (NIZORAL) 2 % cream Apply topically twice daily for 2 - 4 weeks (or for 1 week after lesions have healed). Joshua Calderon MD   ezetimibe (ZETIA) 10 MG tablet take 1 tablet by mouth once daily  Joshua Calderon MD   metoprolol succinate (TOPROL XL) 25 MG extended release tablet Take 1 tablet by mouth daily  Joshua Calderon MD   levothyroxine (SYNTHROID) 50 MCG tablet Take 1 tablet by mouth Daily  Joshua Calderon MD   triamterene-hydroCHLOROthiazide (DYAZIDE) 37.5-25 MG per capsule Take 1 capsule by mouth daily  Joshua Calderon MD   alendronate (FOSAMAX) 70 MG tablet Take 1 tablet by mouth every 7 days  Joshua Calderon MD   ipratropium (ATROVENT) 0.06 % nasal spray 2 sprays by Nasal route in the morning and 2 sprays at noon and 2 sprays before bedtime. Joshua Calderon MD   nystatin (MYCOSTATIN) 739505 UNIT/GM cream Apply topically in the morning and at bedtime Apply topically 2 times daily.   Joshua Calderon MD   hydrocortisone (ANUSOL-HC) 2.5 % CREA rectal cream Place rectally 2 times daily  Madie Pratt PA-C   Glycerin-Polysorbate 80 [facility] DRY EYE THERAPY OP) Apply to eye  Historical Provider, MD   docusate sodium (COLACE) 100 MG capsule Take 1 capsule by mouth 2 times daily as needed for Constipation  Natali Medel MD   Multiple Vitamins-Minerals (PRESERVISION AREDS 2 PO) Take by mouth  Historical Provider, MD   Handicap Placard MISC by Does not apply route DX: GENERALIZED OSTEOARTHRITIS (M15.9), HISTORY OF LEFT KNEE REPLACEMENT (U99.939)     EXPIRES: 10/2025  Corby Medel MD   loperamide (IMODIUM) 2 MG capsule Take 2 mg by mouth 4 times daily as needed for Diarrhea   Historical Provider, MD   acetaminophen (TYLENOL) 500 MG tablet Take 500 mg by mouth every 6 hours as needed for Pain  Historical Provider, MD   Multiple Vitamin (MULTI-VITAMIN PO) Take 1 tablet by mouth daily.   Historical Provider, MD        Allergies   Allergen Reactions    Fenofibrate Other (See Comments)     myalgias    Statins Other (See Comments)     myalgias    Pcn [Penicillins] Rash       Past Medical History:   Diagnosis Date    Abnormal ultrasound of breast     Abscess of abdominal wall 11/05/2016    Bilateral carpal tunnel syndrome 04/23/2018    Bleeding hemorrhoid 03/17/2015    Breast cancer ([address]     Breast cancer, right ([address] 2003    s/p partial mastectomy, radiation    Carbuncle of abdominal wall 11/05/2016    Ductal carcinoma in situ of breast     right    Fibrocystic disease of right breast     Generalized osteoarthritis 08/04/2014    GERD (gastroesophageal reflux disease)     History of breast cancer in female 08/04/2014    History of colon polyps 05/23/2022    HTN (hypertension) 08/04/2014    Hyperlipidemia     Hypertension     Hypothyroidism 12/02/2016    Insomnia 06/16/2015    Macula lutea degeneration     Moderate persistent asthma 11/25/2020    Moderate persistent asthma 11/25/2020    MRSA (methicillin resistant Staphylococcus aureus) infection     MRSA infection 11/2016    LLQ abdominal wall    Obesity     Postmenopausal 02/09/2017    Pre-diabetes 2019    Scoliosis     Subclinical hypothyroidism 2014    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        Past Surgical History:   Procedure Laterality Date    ABSCESS DRAINAGE Left 2016    Abscess abdominal wall LLQ    BREAST BIOPSY Right 08/10/2017    DR LUIS    BREAST SURGERY N/A 2016    ABDOMINAL INCISION AND DRAINAGE performed by Lesa Sorensen MD at [address]  04/15/2005    COLONOSCOPY  2009    DR Ita Alcazar    COLONOSCOPY  2014    diverticulosis, polyps x 3 (DR HATCH)    DILATION AND CURETTAGE OF UTERUS      #1    DILATION AND CURETTAGE OF UTERUS      #2    HERNIA REPAIR      [facility]    HIP SURGERY Left 2018    JOINT REPLACEMENT Left     DR SANCHEZ, hip    LAPAROSCOPY Left 12/15/2021    LAPAROSCOPIC LEFT OOPHORECTOMY AND LEFT ADEXNAL REMOVAL.  performed by Yudelka Traylor MD at [facility], PARTIAL Right 2003    OVARY REMOVAL Left 12/15/2021    LAPAROSCOPIC OOPHORECTOMY AND LEFT ADEXNAL MASS REMOVAL (DR FAIR)    NC REVISE MEDIAN N/CARPAL TUNNEL SURG Left 2018    LEFT WRIST CARPAL TUNNEL RELEASE performed by Nilda Vergara MD at [address] N/CARPAL TUNNEL SURG Right 2018    RIGHT WRIST CARPAL TUNNEL RELEASE performed by Nilda Vergara MD at [facility] Bilateral     UPPER GASTROINTESTINAL ENDOSCOPY         Social History     Socioeconomic History    Marital status:      Spouse name: Lisbeth Costa    Number of children: 2    Years of education: 16    Highest education level: Not on file   Occupational History    Occupation: Retired     Comment: former teacher   Tobacco Use    Smoking status: Former     Packs/day: 1.50     Years: 21.00     Pack years: 31.50     Types: Cigarettes     Start date:      Quit date: 1980     Years since quittin.0    Smokeless tobacco: Never   Substance and Sexual Activity    Alcohol use: Yes     Comment: infrequent    Drug use: No    Sexual activity: Never Partners: Male     Comment: monogamous   Other Topics Concern    Not on file   Social History Narrative    Living with spouse     Social Determinants of Health     Financial Resource Strain: Low Risk     Difficulty of Paying Living Expenses: Not hard at all   Food Insecurity: No Food Insecurity    Worried About Running Out of Food in the Last Year: Never true    Ran Out of Food in the Last Year: Never true   Transportation Needs: Not on file   Physical Activity: Insufficiently Active    Days of Exercise per Week: 3 days    Minutes of Exercise per Session: 30 min   Stress: Not on file   Social Connections: Not on file   Intimate Partner Violence: Not on file   Housing Stability: Not on file        Family History   Problem Relation Age of Onset    Heart Disease Father     Heart Attack Father     Diabetes Mother        ADVANCE DIRECTIVE: N, <no information>    There were no vitals filed for this visit. Estimated body mass index is 42.11 kg/m² as calculated from the following:    Height as of 12/13/22: 5' (1.524 m). Weight as of 12/13/22: 215 lb 9.6 oz (97.8 kg). Physical Exam    No flowsheet data found. Lab Results   Component Value Date/Time    CHOL 203 08/12/2022 10:35 AM    CHOL 210 05/06/2021 11:07 AM    CHOL 211 10/15/2020 04:01 PM    TRIG 253 08/12/2022 10:35 AM    TRIG 230 05/06/2021 11:07 AM    TRIG 288 10/15/2020 04:01 PM    HDL 45 08/12/2022 10:35 AM    HDL 45 05/06/2021 11:07 AM    HDL 41 10/15/2020 04:01 PM    LDLCALC 107 08/12/2022 10:35 AM    LDLCALC 119 05/06/2021 11:07 AM    LDLCALC 112 10/15/2020 04:01 PM    GLUCOSE 85 08/12/2022 10:35 AM    GLUCOSE 96 12/01/2011 09:45 AM    LABA1C 5.6 08/12/2022 10:35 AM    LABA1C 5.8 04/30/2021 01:53 PM    LABA1C 6.1 10/15/2020 04:01 PM       The ASCVD Risk score (Mike WAYNE, et al., 2019) failed to calculate for the following reasons:     The 2019 ASCVD risk score is only valid for ages 36 to 78    Immunization History   Administered Date(s) Administered COVID-19, PFIZER PURPLE top, DILUTE for use, (age 15 y+), 30mcg/0.3mL 01/23/2021, 02/13/2021, 11/19/2021    Influenza, FLUAD, (age 72 y+), Adjuvanted, 0.5mL 10/01/2020, 10/26/2021    Influenza, High Dose (Fluzone 65 yrs and older) 10/06/2015, 12/23/2016, 12/18/2017, 10/08/2018    Pneumococcal Conjugate 13-valent ([id number]) 12/23/2016    Pneumococcal Polysaccharide (Yoqjfrkwj23) 05/17/2011    Tdap (Boostrix, Adacel) 12/18/2017    Zoster Recombinant (Shingrix) 11/06/2020       Health Maintenance   Topic Date Due    Colorectal Cancer Screen  09/18/2019    Shingles vaccine (2 of 2) 01/01/2021    COVID-19 Vaccine (4 - Booster for Pfizer series) 01/14/2022    Annual Wellness Visit (AWV)  05/24/2023    Depression Screen  08/08/2023    DTaP/Tdap/Td vaccine (2 - Td or Tdap) 12/18/2027    DEXA (modify frequency per FRAX score)  Completed    Flu vaccine  Completed    Pneumococcal 65+ years Vaccine  Completed    Hepatitis A vaccine  Aged Out    Hib vaccine  Aged Out    Meningococcal (ACWY) vaccine  Aged Out       Assessment & Plan   {There are no diagnoses linked to this encounter. (Refresh or delete this SmartLink)}  No follow-ups on file.          --Martell Estrella, APRN - CNP

## 2023-01-27 DIAGNOSIS — R21 RASH AND OTHER NONSPECIFIC SKIN ERUPTION: ICD-10-CM

## 2023-01-27 RX ORDER — KETOCONAZOLE 20 MG/G
CREAM TOPICAL
Qty: 60 G | Refills: 0 | OUTPATIENT
Start: 2023-01-27

## 2023-01-27 NOTE — TELEPHONE ENCOUNTER
Pharmacy is requesting medication refill.  Please approve or deny this request.    Rx requested:  Requested Prescriptions     Pending Prescriptions Disp Refills    ketoconazole (NIZORAL) 2 % cream [Pharmacy Med Name: KETOCONAZOLE 2% CREAM] 60 g 0     Sig: apply topically twice a day for 2 to 4 weeks ------ or for 1 week after LESION(S) HAVE HEALED------         Last Office Visit:   8/8/2022      Next Visit Date:  Future Appointments   Date Time Provider Ariadna Bright   7/10/2023  1:00 PM [facility]

## 2023-02-27 ENCOUNTER — OFFICE VISIT (OUTPATIENT)
Dept: FAMILY MEDICINE CLINIC | Age: 83
End: 2023-02-27
Payer: MEDICARE

## 2023-02-27 ENCOUNTER — HOSPITAL ENCOUNTER (OUTPATIENT)
Age: 83
Setting detail: SPECIMEN
Discharge: HOME | End: 2023-02-27
Payer: MEDICARE

## 2023-02-27 VITALS
HEART RATE: 84 BPM | SYSTOLIC BLOOD PRESSURE: 132 MMHG | TEMPERATURE: 97.6 F | BODY MASS INDEX: 42.76 KG/M2 | OXYGEN SATURATION: 96 % | WEIGHT: 217.8 LBS | DIASTOLIC BLOOD PRESSURE: 78 MMHG | HEIGHT: 60 IN

## 2023-02-27 DIAGNOSIS — R30.0 DYSURIA: Primary | ICD-10-CM

## 2023-02-27 DIAGNOSIS — R39.15 URINARY URGENCY: ICD-10-CM

## 2023-02-27 DIAGNOSIS — R35.0 URINARY FREQUENCY: ICD-10-CM

## 2023-02-27 DIAGNOSIS — R30.0 DYSURIA: ICD-10-CM

## 2023-02-27 DIAGNOSIS — R09.82 POST-NASAL DRAINAGE: ICD-10-CM

## 2023-02-27 DIAGNOSIS — L30.4 INTERTRIGO: ICD-10-CM

## 2023-02-27 LAB
BILIRUBIN URINE: NEGATIVE
BILIRUBIN, POC: ABNORMAL
BLOOD URINE, POC: ABNORMAL
BLOOD, URINE: NEGATIVE
CLARITY, POC: ABNORMAL
CLARITY: CLEAR
COLOR, POC: YELLOW
COLOR: YELLOW
GLUCOSE URINE, POC: ABNORMAL
GLUCOSE URINE: NEGATIVE MG/DL
KETONES, POC: ABNORMAL
KETONES, URINE: NEGATIVE MG/DL
LEUKOCYTE EST, POC: ABNORMAL
LEUKOCYTE ESTERASE, URINE: ABNORMAL
NITRITE, POC: ABNORMAL
NITRITE, URINE: NEGATIVE
PH UA: 6 (ref 5–9)
PH, POC: 6
PROTEIN UA: ABNORMAL MG/DL
PROTEIN, POC: ABNORMAL
SPECIFIC GRAVITY UA: 1.02 (ref 1–1.03)
SPECIFIC GRAVITY, POC: 1.02
UROBILINOGEN, POC: ABNORMAL
UROBILINOGEN, URINE: 0.2 E.U./DL

## 2023-02-27 PROCEDURE — 3075F SYST BP GE 130 - 139MM HG: CPT | Performed by: FAMILY MEDICINE

## 2023-02-27 PROCEDURE — 81001 URINALYSIS AUTO W/SCOPE: CPT

## 2023-02-27 PROCEDURE — 1123F ACP DISCUSS/DSCN MKR DOCD: CPT | Performed by: FAMILY MEDICINE

## 2023-02-27 PROCEDURE — 87077 CULTURE AEROBIC IDENTIFY: CPT

## 2023-02-27 PROCEDURE — 3078F DIAST BP <80 MM HG: CPT | Performed by: FAMILY MEDICINE

## 2023-02-27 PROCEDURE — 81003 URINALYSIS AUTO W/O SCOPE: CPT | Performed by: FAMILY MEDICINE

## 2023-02-27 PROCEDURE — 99214 OFFICE O/P EST MOD 30 MIN: CPT | Performed by: FAMILY MEDICINE

## 2023-02-27 PROCEDURE — 87086 URINE CULTURE/COLONY COUNT: CPT

## 2023-02-27 RX ORDER — NYSTATIN 100000 [USP'U]/G
POWDER TOPICAL 3 TIMES DAILY
Qty: 60 G | Refills: 2 | Status: SHIPPED | OUTPATIENT
Start: 2023-02-27

## 2023-02-27 RX ORDER — NITROFURANTOIN 25; 75 MG/1; MG/1
100 CAPSULE ORAL 2 TIMES DAILY
Qty: 10 CAPSULE | Refills: 0 | Status: SHIPPED | OUTPATIENT
Start: 2023-02-27 | End: 2023-03-04

## 2023-02-27 RX ORDER — IPRATROPIUM BROMIDE 42 UG/1
2 SPRAY, METERED NASAL 3 TIMES DAILY
Qty: 15 ML | Refills: 5 | Status: SHIPPED | OUTPATIENT
Start: 2023-02-27

## 2023-02-27 SDOH — ECONOMIC STABILITY: FOOD INSECURITY: WITHIN THE PAST 12 MONTHS, YOU WORRIED THAT YOUR FOOD WOULD RUN OUT BEFORE YOU GOT MONEY TO BUY MORE.: NEVER TRUE

## 2023-02-27 SDOH — ECONOMIC STABILITY: HOUSING INSECURITY
IN THE LAST 12 MONTHS, WAS THERE A TIME WHEN YOU DID NOT HAVE A STEADY PLACE TO SLEEP OR SLEPT IN A SHELTER (INCLUDING NOW)?: NO

## 2023-02-27 SDOH — ECONOMIC STABILITY: INCOME INSECURITY: HOW HARD IS IT FOR YOU TO PAY FOR THE VERY BASICS LIKE FOOD, HOUSING, MEDICAL CARE, AND HEATING?: NOT HARD AT ALL

## 2023-02-27 SDOH — ECONOMIC STABILITY: FOOD INSECURITY: WITHIN THE PAST 12 MONTHS, THE FOOD YOU BOUGHT JUST DIDN'T LAST AND YOU DIDN'T HAVE MONEY TO GET MORE.: NEVER TRUE

## 2023-02-27 ASSESSMENT — ENCOUNTER SYMPTOMS
SHORTNESS OF BREATH: 0
NAUSEA: 0

## 2023-02-27 NOTE — ASSESSMENT & PLAN NOTE
Will treat empirically for UTI based on history and POCT urine results. Patient instructed to increase fluid intake, wipe from front to back, and if sexually active, urinate after sex. Call back if fevers, nausea and vomiting, or if symptoms do not improve in 48-72 hours despite treatment.

## 2023-02-27 NOTE — PROGRESS NOTES
Teagan Moses (: 1940) is a 80 y.o. female, Established patient, who presents today for:    Chief Complaint   Patient presents with    Dysuria     Patient states that sx started about 1 week ago Patient states that she has painful urination along with some blood in the urine. ASSESSMENT/PLAN    1. Dysuria  Assessment & Plan: Will treat empirically for UTI based on history and POCT urine results. Patient instructed to increase fluid intake, wipe from front to back, and if sexually active, urinate after sex. Call back if fevers, nausea and vomiting, or if symptoms do not improve in 48-72 hours despite treatment. Orders:  -     POCT Urinalysis No Micro (Auto)  -     nitrofurantoin, macrocrystal-monohydrate, (MACROBID) 100 MG capsule; Take 1 capsule by mouth 2 times daily for 5 days, Disp-10 capsule, R-0Normal  -     Urinalysis, Micro; Future  -     Culture, Urine; Future  2. Urinary urgency  -     nitrofurantoin, macrocrystal-monohydrate, (MACROBID) 100 MG capsule; Take 1 capsule by mouth 2 times daily for 5 days, Disp-10 capsule, R-0Normal  -     Urinalysis, Micro; Future  -     Culture, Urine; Future  3. Urinary frequency  -     nitrofurantoin, macrocrystal-monohydrate, (MACROBID) 100 MG capsule; Take 1 capsule by mouth 2 times daily for 5 days, Disp-10 capsule, R-0Normal  -     Urinalysis, Micro; Future  -     Culture, Urine; Future  4. Intertrigo  -     nystatin (MYCOSTATIN) 524399 UNIT/GM powder; Apply topically 3 times daily Apply 3 times daily. , Topical, 3 TIMES DAILY Starting Mon 2023, Disp-60 g, R-2, Normal  5. Post-nasal drainage  -     ipratropium (ATROVENT) 0.06 % nasal spray; 2 sprays by Nasal route 3 times daily, Disp-15 mL, R-5Normal      Return for KEEP NEXT SCHEDULED VISIT 2023. SUBJECTIVE/OBJECTIVE:    Dysuria   This is a new problem. The current episode started 1 to 4 weeks ago (1 week ago). The problem occurs every urination.  The quality of the pain is described as burning. There has been no fever. There is No history of pyelonephritis. Associated symptoms include frequency, hematuria (noted with wiping) and urgency. Pertinent negatives include no chills, discharge, flank pain or nausea. She has tried nothing for the symptoms. Patient reports return of intertrigo - erythematous pruritic rash with irritation/burning under abdominal pannus - previously treated with course of nystatin with benefit. Patient requests refill of medication. Current Outpatient Medications on File Prior to Visit   Medication Sig Dispense Refill    ezetimibe (ZETIA) 10 MG tablet take 1 tablet by mouth once daily 90 tablet 1    metoprolol succinate (TOPROL XL) 25 MG extended release tablet Take 1 tablet by mouth daily 90 tablet 1    levothyroxine (SYNTHROID) 50 MCG tablet Take 1 tablet by mouth Daily 90 tablet 1    triamterene-hydroCHLOROthiazide (DYAZIDE) 37.5-25 MG per capsule Take 1 capsule by mouth daily 90 capsule 1    alendronate (FOSAMAX) 70 MG tablet Take 1 tablet by mouth every 7 days 12 tablet 1    hydrocortisone (ANUSOL-HC) 2.5 % CREA rectal cream Place rectally 2 times daily 28 g 0    Glycerin-Polysorbate 80 (REFRESH DRY EYE THERAPY OP) Apply to eye      docusate sodium (COLACE) 100 MG capsule Take 1 capsule by mouth 2 times daily as needed for Constipation 60 capsule 2    Multiple Vitamins-Minerals (PRESERVISION AREDS 2 PO) Take by mouth      Handicap Placard MISC by Does not apply route DX: GENERALIZED OSTEOARTHRITIS (M15.9), 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 No current facility-administered medications on file prior to visit.        Allergies   Allergen Reactions Fenofibrate Other (See Comments)     myalgias    Statins Other (See Comments)     myalgias    Pcn [Penicillins] Rash        Review of Systems   Constitutional:  Negative for chills. Respiratory:  Negative for shortness of breath. Cardiovascular:  Negative for chest pain and palpitations. Gastrointestinal:  Negative for nausea. Genitourinary:  Positive for dysuria, frequency, hematuria (noted with wiping) and urgency. Negative for flank pain and vaginal discharge. Skin:  Positive for rash. Vitals:  /78 (Site: Right Upper Arm, Position: Sitting, Cuff Size: Large Adult)   Pulse 84   Temp 97.6 °F (36.4 °C) (Temporal)   Ht 5' (1.524 m)   Wt 217 lb 12.8 oz (98.8 kg)   SpO2 96%   BMI 42.54 kg/m²     Physical Exam  Vitals reviewed. Constitutional:       Appearance: She is well-developed. She is not diaphoretic. Cardiovascular:      Rate and Rhythm: Normal rate and regular rhythm. Pulmonary:      Effort: Pulmonary effort is normal. No respiratory distress. Breath sounds: Normal breath sounds. Abdominal:      General: Bowel sounds are normal.      Palpations: Abdomen is soft. Tenderness: There is no abdominal tenderness. There is no right CVA tenderness, left CVA tenderness, guarding or rebound. Neurological:      Mental Status: She is alert. Ortho Exam (If Applicable)              An electronic signature was used to authenticate this note.      Michael Zaldivar MD

## 2023-02-28 LAB
BACTERIA: ABNORMAL /HPF
EPITHELIAL CELLS, UA: ABNORMAL /HPF
RBC UA: ABNORMAL /HPF (ref 0–2)
WBC UA: >100 /HPF (ref 0–5)

## 2023-03-01 LAB
ORGANISM: ABNORMAL
URINE CULTURE, ROUTINE: ABNORMAL
URINE CULTURE, ROUTINE: ABNORMAL

## 2023-03-01 NOTE — RESULT ENCOUNTER NOTE
Moderate leukocyte esterase, >100 WBC, 3-5 RBC  Patient treated empirically, awaiting formal culture results

## 2023-03-02 NOTE — RESULT ENCOUNTER NOTE
Please notify patient that recent urine culture was positive for UTI sensitive to the antibiotic she was prescribed. She should finish full course of antibiotic prescribed in the office for management.   If her symptoms persist despite full course of antibiotic, the next step would be referral to a urologist.

## 2023-03-17 DIAGNOSIS — E78.2 MIXED HYPERLIPIDEMIA: Chronic | ICD-10-CM

## 2023-03-19 NOTE — TELEPHONE ENCOUNTER
formulary For: Mixed hyperlipidemia     Last ordered: 3 months ago by Sepideh Masterson MD Last refill: 3/9/2023    Rx #: [id number]       To be filled at: [address] [place] [address] [id number] [place] [address] Lynn Aguilar [address]          Medication Refill  (Newest Message First)  [place], Surescripts In routed conversation to [facility] 2 days ago      Future Appointments    Encounter Information    Provider Department Appt Notes   7/10/2023 Sepideh Masterson MD [facility] 6 month f/u     Past Visits    Date Provider Specialty Visit Type Primary Dx   02/27/2023 Sepideh Masterson MD Family Medicine Office Visit Dysuria   01/09/2023 CRISTOPHER Sher - [facility] Family Medicine Office Visit Primary hypertension   12/13/2022 CRISTOPHER Sher - CNP Family Medicine Office Visit COVID-19   08/16/2022 Carmine Rodriguez MD Colon and Rectal Surgery Office Visit Grade III hemorrhoids   08/08/2022 Sepideh Masterson MD Family Medicine Office Visit External hemorrhoids    Department Mismatch    Your Login Department does not match this Encounter Department:  Login Department: [facility]  Encounter Department: Shaun Kamara  Encounter Provider: Demar Blakely     This information is used for printing, e-prescribing and last encounter SmartLinks. To modify the encounter department or provider, click the Change Encounter Details link below.   Change Encounter Details  Change Encounter Details

## 2023-03-20 DIAGNOSIS — I10 ESSENTIAL HYPERTENSION: Chronic | ICD-10-CM

## 2023-03-20 DIAGNOSIS — E03.9 HYPOTHYROIDISM, UNSPECIFIED TYPE: Chronic | ICD-10-CM

## 2023-03-20 RX ORDER — METOPROLOL SUCCINATE 25 MG/1
25 TABLET, EXTENDED RELEASE ORAL DAILY
Qty: 90 TABLET | Refills: 1 | Status: SHIPPED | OUTPATIENT
Start: 2023-03-20

## 2023-03-20 RX ORDER — TRIAMTERENE AND HYDROCHLOROTHIAZIDE 37.5; 25 MG/1; MG/1
1 CAPSULE ORAL DAILY
Qty: 90 CAPSULE | Refills: 1 | Status: SHIPPED | OUTPATIENT
Start: 2023-03-20

## 2023-03-20 RX ORDER — EZETIMIBE 10 MG/1
TABLET ORAL
Qty: 90 TABLET | Refills: 1 | Status: SHIPPED | OUTPATIENT
Start: 2023-03-20

## 2023-03-20 RX ORDER — LEVOTHYROXINE SODIUM 0.05 MG/1
50 TABLET ORAL DAILY
Qty: 90 TABLET | Refills: 1 | Status: SHIPPED | OUTPATIENT
Start: 2023-03-20

## 2023-03-20 NOTE — TELEPHONE ENCOUNTER
Pharmacy is requesting medication refill.  Please approve or deny this request.    Rx requested:  Requested Prescriptions     Pending Prescriptions Disp Refills    triamterene-hydroCHLOROthiazide (DYAZIDE) 37.5-25 MG per capsule [Pharmacy Med Name: TRIAMTERENE/HCTZ CAPS 37.5/25MG] 90 capsule 3     Sig: Take 1 capsule by mouth daily    levothyroxine (SYNTHROID) 50 MCG tablet [Pharmacy Med Name: L-THYROXINE (SYNTHROID) TABS 50MCG] 90 tablet 3     Sig: Take 1 tablet by mouth Daily    metoprolol succinate (TOPROL XL) 25 MG extended release tablet [Pharmacy Med Name: METOPROLOL SUCCINATE ER TABS 25MG] 90 tablet 3     Sig: Take 1 tablet by mouth daily         Last Office Visit:   2/27/2023      Next Visit Date:  Future Appointments   Date Time Provider Ariadna Bright   7/10/2023  1:00 PM MD Girish Alvarez 94

## 2023-03-27 ENCOUNTER — HOSPITAL ENCOUNTER (OUTPATIENT)
Age: 83
Setting detail: SPECIMEN
Discharge: HOME | End: 2023-03-27
Payer: MEDICARE

## 2023-03-27 ENCOUNTER — OFFICE VISIT (OUTPATIENT)
Dept: FAMILY MEDICINE CLINIC | Age: 83
End: 2023-03-27
Payer: MEDICARE

## 2023-03-27 VITALS
HEART RATE: 83 BPM | DIASTOLIC BLOOD PRESSURE: 70 MMHG | BODY MASS INDEX: 42.49 KG/M2 | SYSTOLIC BLOOD PRESSURE: 118 MMHG | WEIGHT: 216.4 LBS | TEMPERATURE: 98 F | HEIGHT: 60 IN | OXYGEN SATURATION: 96 %

## 2023-03-27 DIAGNOSIS — R35.0 URINARY FREQUENCY: ICD-10-CM

## 2023-03-27 DIAGNOSIS — R39.15 URINARY URGENCY: ICD-10-CM

## 2023-03-27 DIAGNOSIS — R30.0 DYSURIA: Primary | ICD-10-CM

## 2023-03-27 DIAGNOSIS — R30.0 DYSURIA: ICD-10-CM

## 2023-03-27 LAB
BILIRUBIN, POC: NORMAL
BLOOD URINE, POC: NORMAL
CLARITY, POC: NORMAL
COLOR, POC: YELLOW
GLUCOSE URINE, POC: NORMAL
KETONES, POC: NORMAL
LEUKOCYTE EST, POC: NORMAL
NITRITE, POC: NORMAL
PH, POC: 7
PROTEIN, POC: NORMAL
REASON FOR REJECTION: NORMAL
SPECIFIC GRAVITY, POC: 1.01
UROBILINOGEN, POC: NORMAL

## 2023-03-27 PROCEDURE — 99214 OFFICE O/P EST MOD 30 MIN: CPT | Performed by: FAMILY MEDICINE

## 2023-03-27 PROCEDURE — 81003 URINALYSIS AUTO W/O SCOPE: CPT | Performed by: FAMILY MEDICINE

## 2023-03-27 PROCEDURE — 3074F SYST BP LT 130 MM HG: CPT | Performed by: FAMILY MEDICINE

## 2023-03-27 PROCEDURE — 3078F DIAST BP <80 MM HG: CPT | Performed by: FAMILY MEDICINE

## 2023-03-27 PROCEDURE — 1123F ACP DISCUSS/DSCN MKR DOCD: CPT | Performed by: FAMILY MEDICINE

## 2023-03-27 PROCEDURE — 87086 URINE CULTURE/COLONY COUNT: CPT

## 2023-03-27 RX ORDER — CIPROFLOXACIN 500 MG/1
500 TABLET, FILM COATED ORAL 2 TIMES DAILY
Qty: 20 TABLET | Refills: 0 | Status: SHIPPED | OUTPATIENT
Start: 2023-03-27 | End: 2023-04-06

## 2023-03-27 ASSESSMENT — ENCOUNTER SYMPTOMS
NAUSEA: 0
DIARRHEA: 0
VOMITING: 0
ABDOMINAL PAIN: 0
SHORTNESS OF BREATH: 0
CHEST TIGHTNESS: 0

## 2023-03-27 NOTE — PROGRESS NOTES
Van Luna (: 1940) is a 80 y.o. female, Established patient, who presents today for:    Chief Complaint   Patient presents with    Urinary Frequency     Patient states that she has painful urination along with frequent urination. Patient states sx started about 1 week ago          ASSESSMENT/PLAN    1. Dysuria  Comments: Will treat empirically for UTI based on reported symptoms. Patient advised to wipe from front to back and remain well-hydrated. Orders:  -     POCT Urinalysis No Micro (Auto)  -     Culture, Urine; Future  -     Urinalysis, Micro; Future  -     ciprofloxacin (CIPRO) 500 MG tablet; Take 1 tablet by mouth 2 times daily for 10 days, Disp-20 tablet, R-0Normal  2. Urinary frequency  Comments: Will empirically treat for UTI based on reported symptoms. Would refer to urogynecology should symptoms persist despite negative urine culture. Orders:  -     POCT Urinalysis No Micro (Auto)  -     Culture, Urine; Future  -     Urinalysis, Micro; Future  -     ciprofloxacin (CIPRO) 500 MG tablet; Take 1 tablet by mouth 2 times daily for 10 days, Disp-20 tablet, R-0Normal  3. Urinary urgency  -     ciprofloxacin (CIPRO) 500 MG tablet; Take 1 tablet by mouth 2 times daily for 10 days, Disp-20 tablet, R-0Normal      Return if symptoms worsen or fail to improve. SUBJECTIVE/OBJECTIVE:    Patient presents for acute visit regarding 1 week history of dysuria with urinary frequency and urgency. She is status post UTI treated with a 5-day course of Macrobid beginning on 2023. Patient reports previous urinary symptoms resolved with previous course of antibiotic. Urinary Frequency   This is a recurrent problem. The current episode started in the past 7 days. The problem occurs every urination. The quality of the pain is described as burning. There has been no fever. Associated symptoms include frequency and urgency.  Pertinent negatives include no chills, discharge, flank pain, hematuria,

## 2023-03-29 DIAGNOSIS — R35.0 URINARY FREQUENCY: ICD-10-CM

## 2023-03-29 DIAGNOSIS — R30.0 DYSURIA: Primary | ICD-10-CM

## 2023-03-29 DIAGNOSIS — R39.15 URINARY URGENCY: ICD-10-CM

## 2023-03-29 LAB — BACTERIA UR CULT: NORMAL

## 2023-03-29 NOTE — RESULT ENCOUNTER NOTE
Please notify patient that recent urine culture returned negative. She can stop the antibiotic she was prescribed empirically at the office. I would like for her to be seen by a urogynecologist to discuss further management since we do not have an explanation for her recurrent dysuria, urinary frequency, or urinary urgency. I have placed a referral in her chart, please help her schedule a visit.

## 2023-03-30 ENCOUNTER — HOSPITAL ENCOUNTER (OUTPATIENT)
Dept: LAB | Age: 83
Discharge: HOME | End: 2023-03-30
Payer: MEDICARE

## 2023-03-30 LAB
BACTERIA URNS QL MICRO: ABNORMAL /HPF
BILIRUB UR QL STRIP: NEGATIVE
CLARITY UR: CLEAR
COLOR UR: YELLOW
CRYSTALS URNS MICRO: ABNORMAL /HPF
EPI CELLS #/AREA URNS HPF: ABNORMAL /HPF
GLUCOSE UR STRIP-MCNC: NEGATIVE MG/DL
HGB UR QL STRIP: NEGATIVE
KETONES UR STRIP-MCNC: NEGATIVE MG/DL
LEUKOCYTE ESTERASE UR QL STRIP: NEGATIVE
NITRITE UR QL STRIP: NEGATIVE
PH UR STRIP: 7.5 [PH] (ref 5–9)
PROT UR STRIP-MCNC: NEGATIVE MG/DL
RBC #/AREA URNS HPF: ABNORMAL /HPF (ref 0–2)
SP GR UR STRIP: 1.02 (ref 1–1.03)
UROBILINOGEN UR STRIP-ACNC: 0.2 E.U./DL
WBC #/AREA URNS HPF: ABNORMAL /HPF (ref 0–5)

## 2023-03-30 PROCEDURE — 81001 URINALYSIS AUTO W/SCOPE: CPT

## 2023-06-09 ENCOUNTER — HOSPITAL ENCOUNTER (OUTPATIENT)
Age: 83
End: 2023-06-09
Payer: MEDICARE

## 2023-06-09 ENCOUNTER — HOSPITAL ENCOUNTER (OUTPATIENT)
Dept: GENERAL RADIOLOGY | Age: 83
End: 2023-06-09
Payer: MEDICARE

## 2023-06-09 ENCOUNTER — OFFICE VISIT (OUTPATIENT)
Dept: FAMILY MEDICINE CLINIC | Age: 83
End: 2023-06-09

## 2023-06-09 VITALS
HEIGHT: 62 IN | WEIGHT: 214.6 LBS | OXYGEN SATURATION: 96 % | BODY MASS INDEX: 39.49 KG/M2 | DIASTOLIC BLOOD PRESSURE: 78 MMHG | HEART RATE: 82 BPM | TEMPERATURE: 98.7 F | SYSTOLIC BLOOD PRESSURE: 126 MMHG

## 2023-06-09 DIAGNOSIS — M25.511 ACUTE PAIN OF RIGHT SHOULDER: ICD-10-CM

## 2023-06-09 DIAGNOSIS — M25.511 ACUTE PAIN OF RIGHT SHOULDER: Primary | ICD-10-CM

## 2023-06-09 PROCEDURE — 73030 X-RAY EXAM OF SHOULDER: CPT

## 2023-06-09 RX ORDER — LIDOCAINE 4 G/G
1 PATCH TOPICAL DAILY
Qty: 7 PATCH | Refills: 0 | Status: SHIPPED | OUTPATIENT
Start: 2023-06-09

## 2023-06-09 RX ORDER — IBUPROFEN 200 MG
400 TABLET ORAL 2 TIMES DAILY PRN
Qty: 100 TABLET | Refills: 0 | Status: SHIPPED | OUTPATIENT
Start: 2023-06-09

## 2023-06-09 RX ORDER — PREDNISONE 20 MG/1
40 TABLET ORAL DAILY
Qty: 10 TABLET | Refills: 0 | Status: SHIPPED | OUTPATIENT
Start: 2023-06-09 | End: 2023-06-14

## 2023-06-09 NOTE — PROGRESS NOTES
[address]          ASSESSMENT/PLAN     Cyn Larsen is a 80 y.o. female who presents with:  Reporting worsening of pain in the right shoulder pain is moderate constant lateral and posterior of the right shoulder. History of chronic pain in the shoulder. Patient is positive for pain with straight arm raise, she is able to tolerate passive range of motion without pain tender along the posterior and lateral aspect with palpation. Denies any new injury or strain. She has been using heating pad and taking Tylenol to improve symptoms. Advised patient on use of 400 mg ibuprofen twice daily for the next couple of days order for prednisone and lidocaine transdermal patches. She is provided with right arm sling. Recommended resting if symptoms not improving after 4 to 5 days notify me for orthopedic referral.  X-ray will be obtained to rule out chronic or degenerative changes      1. Acute pain of right shoulder  -     XR SHOULDER RIGHT (MIN 2 VIEWS); Future  -     predniSONE (DELTASONE) 20 MG tablet; Take 2 tablets by mouth daily for 5 days, Disp-10 tablet, R-0Normal  -     lidocaine 4 % external patch; Place 1 patch onto the skin daily, TransDERmal, DAILY Starting Fri 6/9/2023, Disp-7 patch, R-0, Normal  -     ibuprofen (ADVIL) 200 MG tablet; Take 2 tablets by mouth 2 times daily as needed for Pain, Disp-100 tablet, R-0Normal  -     [employer] Arm Sling, Right; M           PATIENT REFERRED TO:  Return if symptoms worsen or fail to improve. DISCHARGE MEDICATIONS:  New Prescriptions    IBUPROFEN (ADVIL) 200 MG TABLET    Take 2 tablets by mouth 2 times daily as needed for Pain    LIDOCAINE 4 % EXTERNAL PATCH    Place 1 patch onto the skin daily    PREDNISONE (DELTASONE) 20 MG TABLET    Take 2 tablets by mouth daily for 5 days     Cannot display discharge medications since this is not an admission.        CRISTOPHER Trinh - CNP    CHIEF COMPLAINT       Chief

## 2023-06-09 NOTE — PATIENT INSTRUCTIONS
Use 400 mg of ibuprofen along with prednisone and lidocaine patches for the next 5 days .   I symptoms not improving will need to see orthopedic

## 2023-06-10 ASSESSMENT — ENCOUNTER SYMPTOMS
BACK PAIN: 0
RESPIRATORY NEGATIVE: 1
COLOR CHANGE: 0

## 2023-06-12 NOTE — RESULT ENCOUNTER NOTE
Right shoulder x-ray shows degenerative changes in the joint. Continue with current treatment and wearing arm sling.   If symptoms not improving after 4 to 5 days notify me for orthopedic referral

## 2023-07-10 ENCOUNTER — OFFICE VISIT (OUTPATIENT)
Dept: FAMILY MEDICINE CLINIC | Age: 83
End: 2023-07-10
Payer: MEDICARE

## 2023-07-10 VITALS
SYSTOLIC BLOOD PRESSURE: 134 MMHG | WEIGHT: 214 LBS | TEMPERATURE: 98 F | DIASTOLIC BLOOD PRESSURE: 70 MMHG | BODY MASS INDEX: 39.14 KG/M2 | OXYGEN SATURATION: 99 % | HEART RATE: 84 BPM

## 2023-07-10 DIAGNOSIS — E03.9 HYPOTHYROIDISM, UNSPECIFIED TYPE: Chronic | ICD-10-CM

## 2023-07-10 DIAGNOSIS — R35.1 NOCTURIA: ICD-10-CM

## 2023-07-10 DIAGNOSIS — E78.2 MIXED HYPERLIPIDEMIA: Chronic | ICD-10-CM

## 2023-07-10 DIAGNOSIS — E66.01 CLASS 2 SEVERE OBESITY WITH SERIOUS COMORBIDITY AND BODY MASS INDEX (BMI) OF 39.0 TO 39.9 IN ADULT, UNSPECIFIED OBESITY TYPE (HCC): ICD-10-CM

## 2023-07-10 DIAGNOSIS — K21.9 GASTROESOPHAGEAL REFLUX DISEASE, UNSPECIFIED WHETHER ESOPHAGITIS PRESENT: Chronic | ICD-10-CM

## 2023-07-10 DIAGNOSIS — R73.03 PRE-DIABETES: ICD-10-CM

## 2023-07-10 DIAGNOSIS — I10 PRIMARY HYPERTENSION: Primary | Chronic | ICD-10-CM

## 2023-07-10 DIAGNOSIS — J45.40 MODERATE PERSISTENT ASTHMA WITHOUT COMPLICATION: Chronic | ICD-10-CM

## 2023-07-10 DIAGNOSIS — L30.4 INTERTRIGO: ICD-10-CM

## 2023-07-10 DIAGNOSIS — M25.511 RIGHT SHOULDER PAIN, UNSPECIFIED CHRONICITY: ICD-10-CM

## 2023-07-10 DIAGNOSIS — M81.0 AGE-RELATED OSTEOPOROSIS WITHOUT CURRENT PATHOLOGICAL FRACTURE: ICD-10-CM

## 2023-07-10 PROBLEM — E66.9 OBESITY: Status: ACTIVE | Noted: 2020-10-15

## 2023-07-10 PROCEDURE — 3075F SYST BP GE 130 - 139MM HG: CPT | Performed by: FAMILY MEDICINE

## 2023-07-10 PROCEDURE — 3078F DIAST BP <80 MM HG: CPT | Performed by: FAMILY MEDICINE

## 2023-07-10 PROCEDURE — 99214 OFFICE O/P EST MOD 30 MIN: CPT | Performed by: FAMILY MEDICINE

## 2023-07-10 PROCEDURE — 1123F ACP DISCUSS/DSCN MKR DOCD: CPT | Performed by: FAMILY MEDICINE

## 2023-07-10 RX ORDER — NYSTATIN 100000 U/G
CREAM TOPICAL 2 TIMES DAILY
Qty: 60 G | Refills: 1 | Status: SHIPPED | OUTPATIENT
Start: 2023-07-10

## 2023-07-10 ASSESSMENT — ENCOUNTER SYMPTOMS
DIARRHEA: 0
BLOOD IN STOOL: 0
WHEEZING: 0
ANAL BLEEDING: 0
SHORTNESS OF BREATH: 0
CHEST TIGHTNESS: 0
CONSTIPATION: 0
COUGH: 0
NAUSEA: 0
ABDOMINAL PAIN: 0
VOMITING: 0

## 2023-07-10 NOTE — PROGRESS NOTES
Hadley Cortes (: 1940) is a 80 y.o. female, Established patient, who presents today for:    Chief Complaint   Patient presents with    6 Month Follow-Up     Pt would like to discuss arm/ shoulder pain not getting any better. States she is experiencing nocturia , states she has been cold a lot more recently          ASSESSMENT/PLAN    1. Primary hypertension  Assessment & Plan:  Blood pressure within normal limits today in the office. Patient instructed to continue with current doses of metoprolol and triamterene-hydrochlorothiazide. Orders:  -     CBC with Auto Differential; Future  -     Comprehensive Metabolic Panel; Future  2. Mixed hyperlipidemia  -     Comprehensive Metabolic Panel; Future  -     Lipid Panel; Future  3. Moderate persistent asthma without complication  Assessment & Plan:  No reported respiratory complaints. We will continue to monitor for symptoms over time. 4. Pre-diabetes  -     Comprehensive Metabolic Panel; Future  -     Hemoglobin A1C; Future  5. Hypothyroidism, unspecified type  Assessment & Plan:  Most recent TSH and free T4 levels within normal range. We will continue to follow lab work over time. Patient instructed to continue with current dose of levothyroxine. Orders:  -     TSH; Future  -     T4, Free; Future  6. Gastroesophageal reflux disease, unspecified whether esophagitis present  Assessment & Plan:  Currently asymptomatic. Patient instructed to continue with lifestyle measures and avoidance of dietary triggers. Orders:  -     CBC with Auto Differential; Future  7. Class 2 severe obesity with serious comorbidity and body mass index (BMI) of 39.0 to 39.9 in adult, unspecified obesity type [facility])  Assessment & Plan:  Patient counseled on healthy dietary choices and the benefits of a lower salt and/or lower carbohydrate diet as appropriate.  Patient also counseled on benefits of moderate intensity cardiovascular exercise for 150 minutes per week as they are

## 2023-07-11 NOTE — ASSESSMENT & PLAN NOTE
Currently asymptomatic. Patient instructed to continue with lifestyle measures and avoidance of dietary triggers.

## 2023-07-11 NOTE — ASSESSMENT & PLAN NOTE
Blood pressure within normal limits today in the office. Patient instructed to continue with current doses of metoprolol and triamterene-hydrochlorothiazide.

## 2023-07-11 NOTE — ASSESSMENT & PLAN NOTE
Most recent TSH and free T4 levels within normal range. We will continue to follow lab work over time. Patient instructed to continue with current dose of levothyroxine.

## 2023-07-24 ENCOUNTER — HOSPITAL ENCOUNTER (OUTPATIENT)
Dept: LAB | Age: 83
Discharge: HOME | End: 2023-07-24
Payer: MEDICARE

## 2023-07-24 DIAGNOSIS — E78.2 MIXED HYPERLIPIDEMIA: Chronic | ICD-10-CM

## 2023-07-24 DIAGNOSIS — E03.9 HYPOTHYROIDISM, UNSPECIFIED TYPE: Chronic | ICD-10-CM

## 2023-07-24 DIAGNOSIS — K21.9 GASTROESOPHAGEAL REFLUX DISEASE, UNSPECIFIED WHETHER ESOPHAGITIS PRESENT: Chronic | ICD-10-CM

## 2023-07-24 DIAGNOSIS — E66.01 CLASS 2 SEVERE OBESITY WITH SERIOUS COMORBIDITY AND BODY MASS INDEX (BMI) OF 39.0 TO 39.9 IN ADULT, UNSPECIFIED OBESITY TYPE (HCC): ICD-10-CM

## 2023-07-24 DIAGNOSIS — M81.0 AGE-RELATED OSTEOPOROSIS WITHOUT CURRENT PATHOLOGICAL FRACTURE: ICD-10-CM

## 2023-07-24 DIAGNOSIS — R73.03 PRE-DIABETES: ICD-10-CM

## 2023-07-24 DIAGNOSIS — I10 PRIMARY HYPERTENSION: Chronic | ICD-10-CM

## 2023-07-24 LAB
ALBUMIN SERPL-MCNC: 4.1 G/DL (ref 3.5–4.6)
ALP SERPL-CCNC: 71 U/L (ref 40–130)
ALT SERPL-CCNC: 14 U/L (ref 0–33)
ANION GAP SERPL CALCULATED.3IONS-SCNC: 11 MEQ/L (ref 9–15)
AST SERPL-CCNC: 16 U/L (ref 0–35)
BASOPHILS # BLD: 0 K/UL (ref 0–0.2)
BASOPHILS NFR BLD: 0.7 %
BILIRUB SERPL-MCNC: 0.4 MG/DL (ref 0.2–0.7)
BUN SERPL-MCNC: 15 MG/DL (ref 8–23)
CALCIUM SERPL-MCNC: 9.7 MG/DL (ref 8.5–9.9)
CHLORIDE SERPL-SCNC: 102 MEQ/L (ref 95–107)
CHOLEST SERPL-MCNC: 200 MG/DL (ref 0–199)
CO2 SERPL-SCNC: 29 MEQ/L (ref 20–31)
CREAT SERPL-MCNC: 0.64 MG/DL (ref 0.5–0.9)
EOSINOPHIL # BLD: 0.1 K/UL (ref 0–0.7)
EOSINOPHIL NFR BLD: 2 %
ERYTHROCYTE [DISTWIDTH] IN BLOOD BY AUTOMATED COUNT: 14 % (ref 11.5–14.5)
GLOBULIN SER CALC-MCNC: 2.7 G/DL (ref 2.3–3.5)
GLUCOSE SERPL-MCNC: 102 MG/DL (ref 70–99)
HBA1C MFR BLD: 6.1 % (ref 4.8–5.9)
HCT VFR BLD AUTO: 40.6 % (ref 37–47)
HDLC SERPL-MCNC: 41 MG/DL (ref 40–59)
HGB BLD-MCNC: 13.2 G/DL (ref 12–16)
LDLC SERPL CALC-MCNC: 116 MG/DL (ref 0–129)
LYMPHOCYTES # BLD: 1 K/UL (ref 1–4.8)
LYMPHOCYTES NFR BLD: 14.9 %
MCH RBC QN AUTO: 28.5 PG (ref 27–31.3)
MCHC RBC AUTO-ENTMCNC: 32.6 % (ref 33–37)
MCV RBC AUTO: 87.5 FL (ref 79.4–94.8)
MONOCYTES # BLD: 0.6 K/UL (ref 0.2–0.8)
MONOCYTES NFR BLD: 8.5 %
NEUTROPHILS # BLD: 4.9 K/UL (ref 1.4–6.5)
NEUTS SEG NFR BLD: 73.9 %
PLATELET # BLD AUTO: 271 K/UL (ref 130–400)
POTASSIUM SERPL-SCNC: 4 MEQ/L (ref 3.4–4.9)
PROT SERPL-MCNC: 6.8 G/DL (ref 6.3–8)
RBC # BLD AUTO: 4.64 M/UL (ref 4.2–5.4)
SODIUM SERPL-SCNC: 142 MEQ/L (ref 135–144)
T4 FREE SERPL-MCNC: 1.23 NG/DL (ref 0.84–1.68)
TRIGL SERPL-MCNC: 217 MG/DL (ref 0–150)
TSH SERPL-MCNC: 4.89 UIU/ML (ref 0.44–3.86)
WBC # BLD AUTO: 6.6 K/UL (ref 4.8–10.8)

## 2023-07-24 PROCEDURE — 80061 LIPID PANEL: CPT

## 2023-07-24 PROCEDURE — 82306 VITAMIN D 25 HYDROXY: CPT

## 2023-07-24 PROCEDURE — 80053 COMPREHEN METABOLIC PANEL: CPT

## 2023-07-24 PROCEDURE — 36415 COLL VENOUS BLD VENIPUNCTURE: CPT

## 2023-07-24 PROCEDURE — 84439 ASSAY OF FREE THYROXINE: CPT

## 2023-07-24 PROCEDURE — 84443 ASSAY THYROID STIM HORMONE: CPT

## 2023-07-24 PROCEDURE — 85025 COMPLETE CBC W/AUTO DIFF WBC: CPT

## 2023-07-24 PROCEDURE — 83036 HEMOGLOBIN GLYCOSYLATED A1C: CPT

## 2023-07-25 LAB — VITAMIN D 25-HYDROXY: 36.4 NG/ML

## 2023-08-21 ENCOUNTER — NURSE TRIAGE (OUTPATIENT)
Dept: OTHER | Facility: CLINIC | Age: 83
End: 2023-08-21

## 2023-08-21 ENCOUNTER — OFFICE VISIT (OUTPATIENT)
Dept: FAMILY MEDICINE CLINIC | Age: 83
End: 2023-08-21
Payer: MEDICARE

## 2023-08-21 VITALS
HEART RATE: 100 BPM | SYSTOLIC BLOOD PRESSURE: 116 MMHG | BODY MASS INDEX: 39.14 KG/M2 | OXYGEN SATURATION: 94 % | WEIGHT: 214 LBS | DIASTOLIC BLOOD PRESSURE: 62 MMHG

## 2023-08-21 DIAGNOSIS — K62.5 RECTAL BLEEDING: Primary | ICD-10-CM

## 2023-08-21 DIAGNOSIS — K59.00 CONSTIPATION, UNSPECIFIED CONSTIPATION TYPE: ICD-10-CM

## 2023-08-21 DIAGNOSIS — K64.8 INTERNAL HEMORRHOIDS: ICD-10-CM

## 2023-08-21 PROCEDURE — 3078F DIAST BP <80 MM HG: CPT | Performed by: FAMILY MEDICINE

## 2023-08-21 PROCEDURE — 99213 OFFICE O/P EST LOW 20 MIN: CPT | Performed by: FAMILY MEDICINE

## 2023-08-21 PROCEDURE — 1123F ACP DISCUSS/DSCN MKR DOCD: CPT | Performed by: FAMILY MEDICINE

## 2023-08-21 PROCEDURE — 3074F SYST BP LT 130 MM HG: CPT | Performed by: FAMILY MEDICINE

## 2023-08-21 RX ORDER — DOCUSATE SODIUM 100 MG/1
100 CAPSULE, LIQUID FILLED ORAL 2 TIMES DAILY PRN
Qty: 60 CAPSULE | Refills: 2 | Status: SHIPPED | OUTPATIENT
Start: 2023-08-21

## 2023-08-21 ASSESSMENT — ENCOUNTER SYMPTOMS
ABDOMINAL DISTENTION: 0
CONSTIPATION: 1
RECTAL PAIN: 0
BLOOD IN STOOL: 0
DIARRHEA: 0
ABDOMINAL PAIN: 0
VOMITING: 0
NAUSEA: 0
ANAL BLEEDING: 1
SHORTNESS OF BREATH: 0
CHEST TIGHTNESS: 0

## 2023-08-21 NOTE — PROGRESS NOTES
Kelly Brennan (: 1940) is a 80 y.o. female, Established patient, who presents today for:    Chief Complaint   Patient presents with    Other     Pt is complaining of Hemorrhoids for the past few days with blood in the toilet          ASSESSMENT/PLAN    1. Rectal bleeding  -     CBC with Auto Differential; Future  -     Ambulatory referral to Colorectal Surgery  2. Internal hemorrhoids  -     CBC with Auto Differential; Future  -     Ambulatory referral to Colorectal Surgery  3. Constipation, unspecified constipation type  -     docusate sodium (COLACE) 100 MG capsule; Take 1 capsule by mouth 2 times daily as needed for Constipation, Disp-60 capsule, R-2Normal      Return for KEEP NEXT SCHEDULED VISIT 2023. SUBJECTIVE/OBJECTIVE:    HPI    Patient presents for acute visit regarding rectal bleeding. She reports intermittent rectal bleeding with BM's over the past 3-4 days. She reports bright red blood on toilet paper with fresh blood in the toilet water. She denies any blood mixed in stool. There is no reported rectal pain or pruritis. She reports increased straining with BM's over the past several weeks. She states she will usually have a BM every 3 days, they are formed BM's. She reports a known history of hemorrhoids and reports past history of hemorrhoidal banding per Dr. Lai Fung last year. Current Outpatient Medications on File Prior to Visit   Medication Sig Dispense Refill    nystatin (MYCOSTATIN) 533971 UNIT/GM cream Apply topically in the morning and at bedtime Apply topically 2 times daily.  60 g 1    lidocaine 4 % external patch Place 1 patch onto the skin daily 7 patch 0    ibuprofen (ADVIL) 200 MG tablet Take 2 tablets by mouth 2 times daily as needed for Pain 100 tablet 0    ezetimibe (ZETIA) 10 MG tablet take 1 tablet by mouth once daily 90 tablet 1    triamterene-hydroCHLOROthiazide (DYAZIDE) 37.5-25 MG per capsule Take 1 capsule by mouth daily 90 capsule 1    levothyroxine

## 2023-08-21 NOTE — TELEPHONE ENCOUNTER
Location of patient: [address] call from [place] at [facility] with [employer]. Subjective: Caller states \"The last couple of days I have bleeding from my hemorrhoids\"     Current Symptoms: History of rectal bleeding from hemorrhoids. Normal BM's lately. No rectal pain or fullness. Onset:  intermittent    Associated Symptoms: NA    Pain Severity: 0/10; N/A; none    Temperature: denies fever     LMP: NA Pregnant: NA    Recommended disposition: See in Office Today    Care advice provided, patient verbalizes understanding; denies any other questions or concerns; instructed to call back for any new or worsening symptoms. Patient/Caller agrees with recommended disposition; writer provided warm transfer to Farida Juárez at [employer] [facility] for appointment scheduling    Attention Provider: Thank you for allowing me to participate in the care of your patient. The patient was connected to triage in response to information provided to the ECC/PSC. Please do not respond through this encounter as the response is not directed to a shared pool.     Reason for Disposition   MODERATE rectal bleeding (small blood clots, passing blood without stool, or toilet water turns red)    Protocols used: Rectal Bleeding-ADULT-[place]

## 2023-08-21 NOTE — ASSESSMENT & PLAN NOTE
Reviewed with patient conservative constipation measures. Patient to increase fluid intake, fiber intake, start a fiber supplement, and restart Colace for long-term management. Of increased straining with worsening hemorrhoids over time and increased risk for bleeding.

## 2023-08-21 NOTE — PATIENT INSTRUCTIONS
For conservative constipation management:  1. Increase water intake to 48-64 oz daily  2. Eat a high fiber diet (fruits, vegetables, whole grains)  3. Start a daily over-the-counter fiber supplement (ex: Metamucil, Konsyl, Benefiber, Citrucel, FiberCon)   4. Take a daily over-the-counter stool softener (ex: Colace, Docusate)  5. Take a daily probiotic over-the-counter for 4 weeks (ex: Align, Culturelle, [employer])  6.  After 3-4 days without a bowel movement despite the above measures, try Miralax over-the-counter

## 2023-08-22 ENCOUNTER — OFFICE VISIT (OUTPATIENT)
Dept: SURGERY | Age: 83
End: 2023-08-22
Payer: MEDICARE

## 2023-08-22 VITALS
HEIGHT: 62 IN | TEMPERATURE: 97.4 F | BODY MASS INDEX: 39.38 KG/M2 | OXYGEN SATURATION: 93 % | HEART RATE: 97 BPM | WEIGHT: 214 LBS

## 2023-08-22 DIAGNOSIS — K64.2 GRADE III HEMORRHOIDS: Primary | ICD-10-CM

## 2023-08-22 PROCEDURE — 46221 LIGATION OF HEMORRHOID(S): CPT | Performed by: COLON & RECTAL SURGERY

## 2023-08-22 PROCEDURE — 99213 OFFICE O/P EST LOW 20 MIN: CPT | Performed by: COLON & RECTAL SURGERY

## 2023-08-22 PROCEDURE — 1123F ACP DISCUSS/DSCN MKR DOCD: CPT | Performed by: COLON & RECTAL SURGERY

## 2023-08-22 ASSESSMENT — ENCOUNTER SYMPTOMS
DIARRHEA: 0
CONSTIPATION: 0
SHORTNESS OF BREATH: 0
COLOR CHANGE: 0
ANAL BLEEDING: 1
RECTAL PAIN: 0
ABDOMINAL PAIN: 0
CHEST TIGHTNESS: 0

## 2023-09-14 DIAGNOSIS — I10 ESSENTIAL HYPERTENSION: Chronic | ICD-10-CM

## 2023-09-14 DIAGNOSIS — E03.9 HYPOTHYROIDISM, UNSPECIFIED TYPE: Chronic | ICD-10-CM

## 2023-09-14 RX ORDER — METOPROLOL SUCCINATE 25 MG/1
25 TABLET, EXTENDED RELEASE ORAL DAILY
Qty: 90 TABLET | Refills: 1 | Status: SHIPPED | OUTPATIENT
Start: 2023-09-14

## 2023-09-14 RX ORDER — TRIAMTERENE AND HYDROCHLOROTHIAZIDE 37.5; 25 MG/1; MG/1
1 CAPSULE ORAL DAILY
Qty: 90 CAPSULE | Refills: 1 | Status: SHIPPED | OUTPATIENT
Start: 2023-09-14

## 2023-09-14 RX ORDER — LEVOTHYROXINE SODIUM 0.05 MG/1
50 TABLET ORAL DAILY
Qty: 90 TABLET | Refills: 1 | Status: SHIPPED | OUTPATIENT
Start: 2023-09-14

## 2023-09-14 NOTE — TELEPHONE ENCOUNTER
Comments:     Last Office Visit (last PCP visit):   8/21/2023    Next Visit Date:  Future Appointments   Date Time Provider [address]   11/14/2023  1:00 PM Yoel Roque MD [address]       **If hasn't been seen in over a year OR hasn't followed up according to last diabetes/ADHD visit, make appointment for patient before sending refill to provider.     Rx requested:  Requested Prescriptions     Pending Prescriptions Disp Refills    metoprolol succinate (TOPROL XL) 25 MG extended release tablet [Pharmacy Med Name: METOPROLOL SUCCINATE ER TABS 25MG] 90 tablet 3     Sig: TAKE 1 TABLET DAILY    levothyroxine (SYNTHROID) 50 MCG tablet [Pharmacy Med Name: L-THYROXINE (SYNTHROID) TABS 50MCG] 90 tablet 3     Sig: TAKE 1 TABLET DAILY    triamterene-hydroCHLOROthiazide (DYAZIDE) 37.5-25 MG per capsule [Pharmacy Med Name: TRIAMTERENE/HCTZ CAPS 37.5/25MG] 90 capsule 3     Sig: TAKE 1 CAPSULE DAILY

## 2023-10-13 ENCOUNTER — HOSPITAL ENCOUNTER (OUTPATIENT)
Dept: LAB | Age: 83
Discharge: HOME | End: 2023-10-13
Payer: MEDICARE

## 2023-10-13 DIAGNOSIS — K62.5 RECTAL BLEEDING: ICD-10-CM

## 2023-10-13 DIAGNOSIS — Z23 NEED FOR INFLUENZA VACCINATION: Primary | ICD-10-CM

## 2023-10-13 DIAGNOSIS — K64.8 INTERNAL HEMORRHOIDS: ICD-10-CM

## 2023-10-13 LAB
BASOPHILS # BLD: 0 K/UL (ref 0–0.2)
BASOPHILS NFR BLD: 0.5 %
EOSINOPHIL # BLD: 0.1 K/UL (ref 0–0.7)
EOSINOPHIL NFR BLD: 1.5 %
ERYTHROCYTE [DISTWIDTH] IN BLOOD BY AUTOMATED COUNT: 13.1 % (ref 11.5–14.5)
HCT VFR BLD AUTO: 43.5 % (ref 37–47)
HGB BLD-MCNC: 13.2 G/DL (ref 12–16)
LYMPHOCYTES # BLD: 1.2 K/UL (ref 1–4.8)
LYMPHOCYTES NFR BLD: 14.5 %
MCH RBC QN AUTO: 28 PG (ref 27–31.3)
MCHC RBC AUTO-ENTMCNC: 30.3 % (ref 33–37)
MCV RBC AUTO: 92.4 FL (ref 79.4–94.8)
MONOCYTES # BLD: 0.8 K/UL (ref 0.2–0.8)
MONOCYTES NFR BLD: 10.4 %
NEUTROPHILS # BLD: 5.8 K/UL (ref 1.4–6.5)
NEUTS SEG NFR BLD: 72.8 %
PLATELET # BLD AUTO: 312 K/UL (ref 130–400)
RBC # BLD AUTO: 4.71 M/UL (ref 4.2–5.4)
WBC # BLD AUTO: 7.9 K/UL (ref 4.8–10.8)

## 2023-10-13 PROCEDURE — 85025 COMPLETE CBC W/AUTO DIFF WBC: CPT

## 2023-10-13 PROCEDURE — 36415 COLL VENOUS BLD VENIPUNCTURE: CPT

## 2023-10-13 NOTE — PROGRESS NOTES
Vaccine Information Sheet, \"Influenza - Inactivated\"  given to Aditya Woods, or parent/legal guardian of  Aditya Woods and verbalized understanding. Patient responses:    Have you ever had a reaction to a flu vaccine? No  Are you able to eat eggs without adverse effects? Yes  Do you have any current illness? No  Have you ever had Guillian Silver Plume Syndrome? No    Flu vaccine given per order. Please see immunization tab.

## 2023-11-14 ENCOUNTER — OFFICE VISIT (OUTPATIENT)
Dept: SURGERY | Age: 83
End: 2023-11-14
Payer: MEDICARE

## 2023-11-14 VITALS
WEIGHT: 214 LBS | HEIGHT: 62 IN | HEART RATE: 100 BPM | BODY MASS INDEX: 39.38 KG/M2 | OXYGEN SATURATION: 95 % | TEMPERATURE: 97.9 F

## 2023-11-14 DIAGNOSIS — K64.2 GRADE III HEMORRHOIDS: Primary | ICD-10-CM

## 2023-11-14 PROCEDURE — 1123F ACP DISCUSS/DSCN MKR DOCD: CPT | Performed by: COLON & RECTAL SURGERY

## 2023-11-14 PROCEDURE — 46221 LIGATION OF HEMORRHOID(S): CPT | Performed by: COLON & RECTAL SURGERY

## 2023-11-14 PROCEDURE — 99213 OFFICE O/P EST LOW 20 MIN: CPT | Performed by: COLON & RECTAL SURGERY

## 2023-11-14 ASSESSMENT — ENCOUNTER SYMPTOMS
ABDOMINAL PAIN: 0
CONSTIPATION: 0
CHEST TIGHTNESS: 0
SHORTNESS OF BREATH: 0
ANAL BLEEDING: 1
DIARRHEA: 0
VOMITING: 0
COLOR CHANGE: 0

## 2023-12-06 DIAGNOSIS — E78.2 MIXED HYPERLIPIDEMIA: Chronic | ICD-10-CM

## 2023-12-06 RX ORDER — EZETIMIBE 10 MG/1
10 TABLET ORAL DAILY
Qty: 90 TABLET | Refills: 0 | Status: SHIPPED | OUTPATIENT
Start: 2023-12-06

## 2023-12-06 NOTE — TELEPHONE ENCOUNTER
Refill sent to pharmacy. Patient is overdue for Medicare annual wellness visit. Please help her schedule before the end of the month.

## 2023-12-06 NOTE — TELEPHONE ENCOUNTER
Health Maintenance Summary     Topic Due On Due Status Completed On    Colorectal Cancer Screening - Colonoscopy Jan 4, 2023 Not Due Jan 4, 2013    Immunization - Td/Tdap Jan 8, 2020 Not Due Jan 8, 2010    Immunization-Zoster  Completed Oct 7, 2008    Immunization - Pneumococcal  Completed Feb 15, 2017    Medicare Wellness Visit Dec 18, 2016 Overdue Dec 18, 2015    Immunization-Influenza  Completed Oct 6, 2016          Patient is due for topics as listed above, he wishes to proceed at this time, order (s) placed and patient given information .       Patient is requesting medication refill. Please approve or deny this request.    Rx requested:  Requested Prescriptions     Pending Prescriptions Disp Refills    ezetimibe (Davy Haynes) 10 MG tablet [Pharmacy Med Name: EZETIMIBE 10 MG TABLET] 90 tablet 1     Sig: take 1 tablet by mouth once daily         Last Office Visit:   8/21/2023      Next Visit Date:  No future appointments.

## 2023-12-11 ENCOUNTER — OFFICE VISIT (OUTPATIENT)
Dept: FAMILY MEDICINE CLINIC | Age: 83
End: 2023-12-11
Payer: MEDICARE

## 2023-12-11 VITALS
HEART RATE: 78 BPM | DIASTOLIC BLOOD PRESSURE: 70 MMHG | SYSTOLIC BLOOD PRESSURE: 134 MMHG | OXYGEN SATURATION: 95 % | WEIGHT: 217.4 LBS | BODY MASS INDEX: 39.76 KG/M2

## 2023-12-11 DIAGNOSIS — Z23 NEED FOR VACCINATION: ICD-10-CM

## 2023-12-11 DIAGNOSIS — Z12.31 ENCOUNTER FOR SCREENING MAMMOGRAM FOR MALIGNANT NEOPLASM OF BREAST: ICD-10-CM

## 2023-12-11 DIAGNOSIS — E66.01 CLASS 2 SEVERE OBESITY WITH SERIOUS COMORBIDITY AND BODY MASS INDEX (BMI) OF 39.0 TO 39.9 IN ADULT, UNSPECIFIED OBESITY TYPE (HCC): ICD-10-CM

## 2023-12-11 DIAGNOSIS — M81.0 AGE-RELATED OSTEOPOROSIS WITHOUT CURRENT PATHOLOGICAL FRACTURE: ICD-10-CM

## 2023-12-11 DIAGNOSIS — E78.2 MIXED HYPERLIPIDEMIA: Chronic | ICD-10-CM

## 2023-12-11 DIAGNOSIS — R73.03 PRE-DIABETES: ICD-10-CM

## 2023-12-11 DIAGNOSIS — E03.9 HYPOTHYROIDISM, UNSPECIFIED TYPE: Chronic | ICD-10-CM

## 2023-12-11 DIAGNOSIS — Z00.00 MEDICARE ANNUAL WELLNESS VISIT, SUBSEQUENT: Primary | ICD-10-CM

## 2023-12-11 DIAGNOSIS — R09.82 POST-NASAL DRAINAGE: ICD-10-CM

## 2023-12-11 DIAGNOSIS — I10 PRIMARY HYPERTENSION: Chronic | ICD-10-CM

## 2023-12-11 PROCEDURE — G0439 PPPS, SUBSEQ VISIT: HCPCS | Performed by: FAMILY MEDICINE

## 2023-12-11 PROCEDURE — 3075F SYST BP GE 130 - 139MM HG: CPT | Performed by: FAMILY MEDICINE

## 2023-12-11 PROCEDURE — 3078F DIAST BP <80 MM HG: CPT | Performed by: FAMILY MEDICINE

## 2023-12-11 PROCEDURE — 1123F ACP DISCUSS/DSCN MKR DOCD: CPT | Performed by: FAMILY MEDICINE

## 2023-12-11 RX ORDER — IPRATROPIUM BROMIDE 42 UG/1
2 SPRAY, METERED NASAL 3 TIMES DAILY
Qty: 15 ML | Refills: 5 | Status: SHIPPED | OUTPATIENT
Start: 2023-12-11

## 2023-12-11 ASSESSMENT — PATIENT HEALTH QUESTIONNAIRE - PHQ9
SUM OF ALL RESPONSES TO PHQ QUESTIONS 1-9: 0
SUM OF ALL RESPONSES TO PHQ QUESTIONS 1-9: 0
1. LITTLE INTEREST OR PLEASURE IN DOING THINGS: 0
2. FEELING DOWN, DEPRESSED OR HOPELESS: 0
SUM OF ALL RESPONSES TO PHQ QUESTIONS 1-9: 0
SUM OF ALL RESPONSES TO PHQ9 QUESTIONS 1 & 2: 0
SUM OF ALL RESPONSES TO PHQ QUESTIONS 1-9: 0

## 2023-12-11 ASSESSMENT — LIFESTYLE VARIABLES
HOW OFTEN DO YOU HAVE A DRINK CONTAINING ALCOHOL: NEVER
HOW MANY STANDARD DRINKS CONTAINING ALCOHOL DO YOU HAVE ON A TYPICAL DAY: PATIENT DOES NOT DRINK

## 2023-12-11 NOTE — PROGRESS NOTES
Navdeep Hnuter MD   levothyroxine (SYNTHROID) 50 MCG tablet Take 1 tablet by mouth daily  Navdeep Hunter MD   docusate sodium (COLACE) 100 MG capsule Take 1 capsule by mouth 2 times daily as needed for Constipation  Patient not taking: Reported on 12/11/2023  Kaila Oglesby MD   nystatin (MYCOSTATIN) 265411 UNIT/GM cream Apply topically in the morning and at bedtime Apply topically 2 times daily. Navdeep Hunter MD   lidocaine 4 % external patch Place 1 patch onto the skin daily  CRISTOPHER Ruff CNP   ibuprofen (ADVIL) 200 MG tablet Take 2 tablets by mouth 2 times daily as needed for Pain  CRISTOPHER Ruff - CNP   nystatin (MYCOSTATIN) 531644 UNIT/GM powder Apply topically 3 times daily Apply 3 times daily.   Navdeep Hunter MD   alendronate (FOSAMAX) 70 MG tablet Take 1 tablet by mouth every 7 days  Patient not taking: Reported on 12/11/2023  Navdeep Hunter MD   Glycerin-Polysorbate 80 (REFRESH DRY EYE THERAPY OP) Apply to eye  ProviderMendez MD   Multiple Vitamins-Minerals (PRESERVISION AREDS 2 PO) Take by mouth  ProviderMendez MD   Handicap Placard MISC by Does not apply route DX: GENERALIZED OSTEOARTHRITIS (M15.9), HISTORY OF LEFT KNEE REPLACEMENT (G56.747)     EXPIRES: 10/2025  Navdeep Hunter MD   loperamide (IMODIUM) 2 MG capsule Take 1 capsule by mouth 4 times daily as needed for Diarrhea  ProviderMendez MD       CareTeam (Including outside providers/suppliers regularly involved in providing care):   Patient Care Team:  Navdeep Hunter MD as PCP - General (Family Medicine)  Navdeep Hunter MD as PCP - Empaneled Provider  Cait Butts MD as Surgeon (Orthopedic Surgery)  Abisai Arce MD as Surgeon (General Surgery)  Ciaran Ramos MD as Consulting Physician (Hematology and Oncology)  Lew Olson MD as Consulting Physician (Pain Medicine)  Gaby Sanon MD as Consulting Physician (Gastroenterology)     Reviewed and

## 2023-12-11 NOTE — PATIENT INSTRUCTIONS
Bedroom    Make sure you use a nightlight and that the area around your bed is clear of potential obstacles. Be careful with electric blankets and never go to sleep with a heating pad, which can cause serious burns even if on a low setting. Use fire-resistant mattress covers and pillows, and NEVER smoke in bed. Keep a phone next to the bed that is programmed to dial 911 at the push of a button. If you have a chronic condition, you may want to sign on with an automatic call-in service. Typically the system includes a small pendant that connects directly to an emergency medical voice-response system. You should also make arrangements to stay in contact with someonefriend, neighbor, family memberon a regular schedule. Fire Prevention   According to the [employer]. (Smoke Alarms for Every) [facility], senior citizens are one of the two highest risk groups for death and serious injuries due to residential fires. When cooking, wear short-sleeved items, never a bulky long-sleeved robe. The [facility]'s Safety Checklist for Older Consumers emphasizes the importance of checking basements, garages, workshops and storage areas for fire hazards, such as volatile liquids, piles of old rags or clothing and overloaded circuits. Never smoke in bed or when lying down on a couch or recliner chair. Small portable electric or kerosene heaters are responsible for many home fires and should be used cautiously if at all. If you do use one, be sure to keep them away from flammable materials. In case of fire, make sure you have a pre-established emergency exit plan. Have a professional check your fireplace and other fuel-burning appliances yearly. Helping Hands   Baby boomers entering the coleman years will continue to see the development of new products to help older adults live safely and independently in spite of age-related changes.   Making Life More Livable  , by Navarro Tran, lists over 1,000

## 2023-12-28 DIAGNOSIS — L30.4 INTERTRIGO: ICD-10-CM

## 2023-12-28 RX ORDER — NYSTATIN 100000 U/G
CREAM TOPICAL 2 TIMES DAILY
Qty: 60 G | Refills: 1 | Status: SHIPPED | OUTPATIENT
Start: 2023-12-28

## 2023-12-28 NOTE — TELEPHONE ENCOUNTER
----- Message from Ezekiel Porter sent at 12/28/2023 12:11 PM EST -----  Subject: Refill Request    QUESTIONS  Name of Medication? nystatin (MYCOSTATIN) 323522 UNIT/GM cream  Patient-reported dosage and instructions? used twice a day  How many days do you have left? 0  Preferred Pharmacy? [facility] [id number]  Pharmacy phone number (if available)? [phone]  Additional Information for Provider? patient has the same rash always has   and needs the prescription refilled  ---------------------------------------------------------------------------  --------------  CALL BACK INFO  What is the best way for the office to contact you? OK to leave message on   voicemail  Preferred Call Back Phone Number? [phone]  ---------------------------------------------------------------------------  --------------  SCRIPT ANSWERS  Relationship to Patient?  Self

## 2023-12-31 ENCOUNTER — HOSPITAL ENCOUNTER (EMERGENCY)
Age: 83
Discharge: HOME | End: 2023-12-31
Attending: EMERGENCY MEDICINE
Payer: MEDICARE

## 2023-12-31 VITALS
BODY MASS INDEX: 40.59 KG/M2 | SYSTOLIC BLOOD PRESSURE: 135 MMHG | RESPIRATION RATE: 18 BRPM | TEMPERATURE: 98.3 F | HEART RATE: 80 BPM | OXYGEN SATURATION: 97 % | HEIGHT: 61 IN | WEIGHT: 215 LBS | DIASTOLIC BLOOD PRESSURE: 81 MMHG

## 2023-12-31 DIAGNOSIS — N30.00 ACUTE CYSTITIS WITHOUT HEMATURIA: Primary | ICD-10-CM

## 2023-12-31 DIAGNOSIS — B37.2 SKIN YEAST INFECTION: ICD-10-CM

## 2023-12-31 LAB
BACTERIA URNS QL MICRO: ABNORMAL /HPF
BILIRUB UR QL STRIP: NEGATIVE
CLARITY UR: ABNORMAL
COLOR UR: YELLOW
EPI CELLS #/AREA URNS HPF: ABNORMAL /HPF
GLUCOSE UR STRIP-MCNC: NEGATIVE MG/DL
HGB UR QL STRIP: ABNORMAL
KETONES UR STRIP-MCNC: NEGATIVE MG/DL
LEUKOCYTE ESTERASE UR QL STRIP: ABNORMAL
NITRITE UR QL STRIP: POSITIVE
PH UR STRIP: 7 [PH] (ref 5–9)
PROT UR STRIP-MCNC: 100 MG/DL
RBC #/AREA URNS HPF: ABNORMAL /HPF (ref 0–2)
SP GR UR STRIP: 1.02 (ref 1–1.03)
URINE REFLEX TO CULTURE: YES
UROBILINOGEN UR STRIP-ACNC: 0.2 E.U./DL
WBC #/AREA URNS HPF: >100 /HPF (ref 0–5)

## 2023-12-31 PROCEDURE — 99283 EMERGENCY DEPT VISIT LOW MDM: CPT

## 2023-12-31 PROCEDURE — 6370000000 HC RX 637 (ALT 250 FOR IP): Performed by: EMERGENCY MEDICINE

## 2023-12-31 PROCEDURE — 81001 URINALYSIS AUTO W/SCOPE: CPT

## 2023-12-31 PROCEDURE — 87086 URINE CULTURE/COLONY COUNT: CPT

## 2023-12-31 PROCEDURE — 87077 CULTURE AEROBIC IDENTIFY: CPT

## 2023-12-31 PROCEDURE — 87186 SC STD MICRODIL/AGAR DIL: CPT

## 2023-12-31 RX ORDER — CIPROFLOXACIN 500 MG/1
500 TABLET, FILM COATED ORAL ONCE
Status: COMPLETED | OUTPATIENT
Start: 2023-12-31 | End: 2023-12-31

## 2023-12-31 RX ORDER — CIPROFLOXACIN 500 MG/1
500 TABLET, FILM COATED ORAL 2 TIMES DAILY
Qty: 10 TABLET | Refills: 0 | Status: SHIPPED | OUTPATIENT
Start: 2023-12-31 | End: 2024-01-05

## 2023-12-31 RX ORDER — FLUCONAZOLE 150 MG/1
150 TABLET ORAL ONCE
Status: COMPLETED | OUTPATIENT
Start: 2023-12-31 | End: 2023-12-31

## 2023-12-31 RX ADMIN — CIPROFLOXACIN 500 MG: 500 TABLET, FILM COATED ORAL at 10:57

## 2023-12-31 RX ADMIN — FLUCONAZOLE 150 MG: 150 TABLET ORAL at 10:58

## 2023-12-31 ASSESSMENT — PAIN - FUNCTIONAL ASSESSMENT: PAIN_FUNCTIONAL_ASSESSMENT: NONE - DENIES PAIN

## 2023-12-31 NOTE — ED PROVIDER NOTES
interactive appropriate nontoxic in no acute distress, patient appears younger than her stated age  Head is atraumatic normocephalic  Eyes pupils are equal and reactive sclera white conjunctive are pink  Oral pharyngeal cavity is pink with good moisture, no exudates or ulcerations no asymmetry, the airway is widely patent  Neck: Supple no meningeal signs no adenopathy no JVD  Heart: Regular rate and rhythm no gross murmurs rubs or clicks  Lungs: Breath sounds are clear with good air movement throughout no active wheezes rales or rhonchi no respiratory distress  Abdomen: Soft nontender with good bowel sounds no rebound rigidity or guarding no firm or pulsatile masses, no gross distention, femoral pulses full and symmetric  Back: Nontender to palpation no costovertebral angle tenderness  Skin: Warm and dry, there is dull red erythematous rash bilaterally under the breast and the lower abdominal pannus there are no satellite lesions no associated warmth area blanches no petechiae or vesicles  Lower extremities: No edema or calf tenderness or asymmetry.    DIAGNOSTIC RESULTS       LABS:  Labs Reviewed   URINALYSIS WITH REFLEX TO CULTURE - Abnormal; Notable for the following components:       Result Value    Protein,  (*)     All other components within normal limits   MICROSCOPIC URINALYSIS - Abnormal; Notable for the following components:    WBC, UA >100 (*)     RBC, UA 10-20 (*)     Bacteria, UA MANY (*)     All other components within normal limits   CULTURE, URINE       All other labs were within normal range or not returned as of this dictation.    EMERGENCY DEPARTMENT COURSE and DIFFERENTIAL DIAGNOSIS/MDM:   Vitals:    Vitals:    12/31/23 1012   BP: (!) 154/112   Pulse: 90   Resp: 16   Temp: 98.3 °F (36.8 °C)   TempSrc: Oral   SpO2: 94%   Weight: 97.5 kg (215 lb)   Height: 1.549 m (5' 1\")     Treatment and course:Patient was given Cipro 500 mg p.o. along with Diflucan 150 mg p.o. here    FINAL IMPRESSION

## 2024-01-02 ENCOUNTER — OFFICE VISIT (OUTPATIENT)
Dept: FAMILY MEDICINE CLINIC | Age: 84
End: 2024-01-02
Payer: MEDICARE

## 2024-01-02 VITALS
HEART RATE: 62 BPM | TEMPERATURE: 98.7 F | OXYGEN SATURATION: 98 % | DIASTOLIC BLOOD PRESSURE: 72 MMHG | SYSTOLIC BLOOD PRESSURE: 130 MMHG

## 2024-01-02 DIAGNOSIS — N30.00 ACUTE CYSTITIS WITHOUT HEMATURIA: Primary | ICD-10-CM

## 2024-01-02 LAB
BACTERIA UR CULT: ABNORMAL
BACTERIA UR CULT: ABNORMAL
ORGANISM: ABNORMAL

## 2024-01-02 PROCEDURE — 3075F SYST BP GE 130 - 139MM HG: CPT

## 2024-01-02 PROCEDURE — 1123F ACP DISCUSS/DSCN MKR DOCD: CPT

## 2024-01-02 PROCEDURE — 99212 OFFICE O/P EST SF 10 MIN: CPT

## 2024-01-02 PROCEDURE — 3078F DIAST BP <80 MM HG: CPT

## 2024-01-02 ASSESSMENT — ENCOUNTER SYMPTOMS
BACK PAIN: 0
RESPIRATORY NEGATIVE: 1
NAUSEA: 0
ABDOMINAL PAIN: 0

## 2024-01-02 NOTE — PROGRESS NOTES
(MYCOSTATIN) 522626 UNIT/GM CREAM    Apply topically in the morning and at bedtime Apply topically 2 times daily.    NYSTATIN (MYCOSTATIN) 669253 UNIT/GM POWDER    Apply topically 3 times daily Apply 3 times daily.    TRIAMTERENE-HYDROCHLOROTHIAZIDE (DYAZIDE) 37.5-25 MG PER CAPSULE    Take 1 capsule by mouth daily     ALLERGIES     Patient is is allergic to fenofibrate, statins, and pcn [penicillins].  FAMILY HISTORY     Patient'sfamily history includes Diabetes in her mother; Heart Attack in her father; Heart Disease in her father.  HISTORY     Patient  reports that she quit smoking about 44 years ago. Her smoking use included cigarettes. She started smoking about 65 years ago. She has a 31.5 pack-year smoking history. She has never used smokeless tobacco. She reports current alcohol use. She reports that she does not use drugs.  READY CARE COURSE   No orders of the defined types were placed in this encounter.       Labs:  No results found for this visit on 01/02/24.  IMAGING:  No orders to display     Scheduled Meds:  Continuous Infusions:  PRN Meds:.

## 2024-01-11 DIAGNOSIS — L30.4 INTERTRIGO: ICD-10-CM

## 2024-01-11 DIAGNOSIS — R09.82 POST-NASAL DRAINAGE: ICD-10-CM

## 2024-01-11 NOTE — TELEPHONE ENCOUNTER
Pt requesting refills  ipratropium (ATROVENT) 0.06 % nasal spray   nystatin (MYCOSTATIN) 205931 UNIT/GM cream     LOV 12/11/23  FOV 6/11/24

## 2024-01-11 NOTE — TELEPHONE ENCOUNTER
Patient is requesting medication refill. Please approve or deny this request.    Rx requested:  Requested Prescriptions     Pending Prescriptions Disp Refills    ipratropium (ATROVENT) 0.06 % nasal spray 15 mL 5     Si sprays by Nasal route 3 times daily    nystatin (MYCOSTATIN) 120695 UNIT/GM cream 60 g 1     Sig: Apply topically in the morning and at bedtime Apply topically 2 times daily.         Last Office Visit:   2023      Next Visit Date:  Future Appointments   Date Time Provider Department Center   2024 11:30 AM Stephon Oglesby MD [facility]

## 2024-01-12 RX ORDER — NYSTATIN 100000 U/G
CREAM TOPICAL 2 TIMES DAILY
Qty: 60 G | Refills: 1 | Status: SHIPPED | OUTPATIENT
Start: 2024-01-12

## 2024-01-12 RX ORDER — IPRATROPIUM BROMIDE 42 UG/1
2 SPRAY, METERED NASAL 3 TIMES DAILY
Qty: 45 ML | Refills: 1 | Status: SHIPPED | OUTPATIENT
Start: 2024-01-12

## 2024-01-19 NOTE — PROGRESS NOTES
2023    Darrick Read (:  1940) is a 80 y.o. female, here for jesenia Santamaria to discuss rash that is being treated with ketoconazole ointment. Advice on weight loss goal she is working with weight loss management provider. Needs medication refill on Claritin    Patient Active Problem List   Diagnosis    Hyperlipidemia    GERD (gastroesophageal reflux disease)    Generalized osteoarthritis    HTN (hypertension)    History of breast cancer in female    Insomnia    Constipation    Hypothyroidism    Postmenopausal    Fibrocystic disease of right breast    Bilateral carpal tunnel syndrome    Pre-diabetes    Allergy status to penicillin    Presence of left artificial knee joint    Scoliosis, unspecified    Morbidly obese (HCC)    Moderate persistent asthma    Hypercalcemia    Ovarian mass, left    History of colon polyps    Age-related osteoporosis without current pathological fracture       Review of Systems   Constitutional:  Negative for fatigue and unexpected weight change. HENT: Negative. Eyes:  Negative for visual disturbance. Respiratory:  Negative for cough, chest tightness and shortness of breath. Cardiovascular:  Negative for chest pain, palpitations and leg swelling. Gastrointestinal:  Negative for diarrhea and nausea. Endocrine: Negative for polydipsia and polyuria. Genitourinary: Negative. Musculoskeletal:  Positive for arthralgias and gait problem. Skin:  Positive for rash. Negative for color change. Neurological:  Negative for weakness, light-headedness, numbness and headaches. Hematological:  Does not bruise/bleed easily. Psychiatric/Behavioral:  Negative for confusion. The patient is not nervous/anxious. Prior to Visit Medications    Medication Sig Taking?  Authorizing Provider   loratadine (CLARITIN) 10 MG tablet Take 1 tablet by mouth daily Yes Bethel Patterson, APRN - CNP   ketoconazole (NIZORAL) 2 % cream Apply topically twice daily for 2 - 4 weeks (or for 1 week after lesions have healed). Yes Suzie Sparrow MD   ezetimibe (ZETIA) 10 MG tablet take 1 tablet by mouth once daily Yes Suzie Sparrow MD   metoprolol succinate (TOPROL XL) 25 MG extended release tablet Take 1 tablet by mouth daily Yes Suzie Sparrow MD   levothyroxine (SYNTHROID) 50 MCG tablet Take 1 tablet by mouth Daily Yes Suzie Sparrow MD   triamterene-hydroCHLOROthiazide (DYAZIDE) 37.5-25 MG per capsule Take 1 capsule by mouth daily Yes Suzie Sparrow MD   alendronate (FOSAMAX) 70 MG tablet Take 1 tablet by mouth every 7 days Yes Suzie Sparrow MD   ipratropium (ATROVENT) 0.06 % nasal spray 2 sprays by Nasal route in the morning and 2 sprays at noon and 2 sprays before bedtime. Yes Suzie Sparrow MD   nystatin (MYCOSTATIN) 137803 UNIT/GM cream Apply topically in the morning and at bedtime Apply topically 2 times daily. Yes Suzie Sparrow MD   hydrocortisone (ANUSOL-HC) 2.5 % CREA rectal cream Place rectally 2 times daily Yes Chelle Espinal PA-C   Glycerin-Polysorbate 80 (REFRESH DRY EYE THERAPY OP) Apply to eye Yes Historical Provider, MD   docusate sodium (COLACE) 100 MG capsule Take 1 capsule by mouth 2 times daily as needed for Constipation Yes Lucy Peter MD   Multiple Vitamins-Minerals (PRESERVISION AREDS 2 PO) Take by mouth Yes Historical Provider, MD   Handicap Placard [address] [facility] by Does not apply route DX: GENERALIZED OSTEOARTHRITIS (M15.9), HISTORY OF LEFT KNEE REPLACEMENT (T70.183)     EXPIRES: 10/2025 Yes Suzie Sparrow MD   loperamide (IMODIUM) 2 MG capsule Take 2 mg by mouth 4 times daily as needed for Diarrhea  Yes Historical Provider, MD   acetaminophen (TYLENOL) 500 MG tablet Take 500 mg by mouth every 6 hours as needed for Pain Yes Historical Provider, MD   Multiple Vitamin (MULTI-VITAMIN PO) Take 1 tablet by mouth daily.  Yes Historical Provider, MD        Allergies   Allergen Reactions    Fenofibrate Other (See Comments)     myalgias    Statins Other (See Comments)     myalgias    Pcn [Penicillins] Rash       Past Medical History:   Diagnosis Date    Abnormal ultrasound of breast     Abscess of abdominal wall 11/05/2016    Bilateral carpal tunnel syndrome 04/23/2018    Bleeding hemorrhoid 03/17/2015    Breast cancer ([facility] [address]     Breast cancer, right ([address] 2003    s/p partial mastectomy, radiation    Carbuncle of abdominal wall 11/05/2016    Ductal carcinoma in situ of breast     right    Fibrocystic disease of right breast     Generalized osteoarthritis 08/04/2014    GERD (gastroesophageal reflux disease)     History of breast cancer in female 08/04/2014    History of colon polyps 05/23/2022    HTN (hypertension) 08/04/2014    Hyperlipidemia     Hypertension     Hypothyroidism 12/02/2016    Insomnia 06/16/2015    Macula lutea degeneration     Moderate persistent asthma 11/25/2020    Moderate persistent asthma 11/25/2020    MRSA (methicillin resistant Staphylococcus aureus) infection     MRSA infection 11/2016    LLQ abdominal wall    Obesity     Postmenopausal 02/09/2017    Pre-diabetes 07/23/2019    Scoliosis     Subclinical hypothyroidism 05/06/2014    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        Past Surgical History:   Procedure Laterality Date    ABSCESS DRAINAGE Left 11/2016    Abscess abdominal wall LLQ    BREAST BIOPSY Right 08/10/2017    DR LUIS    BREAST SURGERY N/A 11/06/2016    ABDOMINAL INCISION AND DRAINAGE performed by Lamar Marie MD at [address] [place] [address]  04/15/2005    COLONOSCOPY  05/19/2009    DR Robe Drummond    COLONOSCOPY  09/18/2014    diverticulosis, polyps x 3 (DR HATCH)    DILATION AND CURETTAGE OF UTERUS      #1    DILATION AND CURETTAGE OF UTERUS      #2    [facility]    HIP SURGERY Left 01/02/2018    JOINT REPLACEMENT Left 2006    DR SANCHEZ, hip    LAPAROSCOPY Left 12/15/2021    LAPAROSCOPIC LEFT OOPHORECTOMY AND LEFT ADEXNAL REMOVAL.  performed by Monse Davidson MD at [place] [address], PARTIAL Right 2003    OVARY REMOVAL Left 12/15/2021    LAPAROSCOPIC OOPHORECTOMY AND LEFT ADEXNAL MASS REMOVAL (DR FAIR)    MO NEUROPLASTY &/TRANSPOS MEDIAN NRV CARPAL TUNNE Left 2018    LEFT WRIST CARPAL TUNNEL RELEASE performed by Kayley Chavez MD at [facility] &/TRANSPOS MEDIAN NRV CARPAL Francheska Stephan Right 2018    RIGHT WRIST CARPAL TUNNEL RELEASE performed by Kayley Chavez MD at [id number] [place] [address] 83 Bilateral     UPPER GASTROINTESTINAL ENDOSCOPY         Social History     Socioeconomic History    Marital status:      Spouse name: Flory Kirkland    Number of children: 2    Years of education: 16    Highest education level: Not on file   Occupational History    Occupation: Retired     Comment: former teacher   Tobacco Use    Smoking status: Former     Packs/day: 1.50     Years: 21.00     Pack years: 31.50     Types: Cigarettes     Start date:      Quit date:      Years since quittin.0    Smokeless tobacco: Never   Substance and Sexual Activity    Alcohol use: Yes     Comment: infrequent    Drug use: No    Sexual activity: Never     Partners: Male     Comment: monogamous   Other Topics Concern    Not on file   Social History Narrative    Living with spouse     Social Determinants of Health     Financial Resource Strain: Low Risk     Difficulty of Paying Living Expenses: Not hard at all   Food Insecurity: No Food Insecurity    Worried About Running Out of Food in the Last Year: Never true    [address] in the Last Year: Never true   Transportation Needs: Not on file   Physical Activity: Insufficiently Active    Days of Exercise per Week: 3 days    Minutes of Exercise per Session: 30 min   Stress: Not on file   Social Connections: Not on file   Intimate Partner Violence: Not on file   Housing Stability: Not on file        Family History   Problem Relation Age of Onset    Heart Disease Father     Heart Attack Father     Diabetes Mother        ADVANCE DIRECTIVE: N, <no information>    Vitals:    01/09/23 1238   BP: 130/68   Site: Right Upper Arm   Position: Sitting   Cuff Size: Large Adult   Pulse: 98   Resp: 18   Temp: 98 °F (36.7 °C)   TempSrc: Temporal   SpO2: 95%   Weight: 214 lb 9.6 oz (97.3 kg)   Height: 5' (1.524 m)     Estimated body mass index is 41.91 kg/m² as calculated from the following:    Height as of this encounter: 5' (1.524 m). Weight as of this encounter: 214 lb 9.6 oz (97.3 kg). Physical Exam  Vitals reviewed. Exam conducted with a chaperone present. Constitutional:       General: She is not in acute distress. Appearance: Normal appearance. HENT:      Head: Normocephalic. Nose: Nose normal.      Mouth/Throat:      Mouth: Mucous membranes are moist.   Eyes:      General:         Right eye: No discharge. Left eye: No discharge. Conjunctiva/sclera: Conjunctivae normal.   Cardiovascular:      Rate and Rhythm: Normal rate. Pulses: Normal pulses. Heart sounds: Normal heart sounds. No murmur heard. No gallop. Pulmonary:      Effort: Pulmonary effort is normal.      Breath sounds: Normal breath sounds. No wheezing or rhonchi. Abdominal:      General: Bowel sounds are normal. There is no distension. Musculoskeletal:         General: No swelling, tenderness, deformity or signs of injury. Normal range of motion. Cervical back: Normal range of motion and neck supple. No rigidity. Right lower leg: No edema. Left lower leg: No edema. Skin:     General: Skin is warm and dry. Capillary Refill: Capillary refill takes less than 2 seconds. Coloration: Skin is not pale. Findings: Erythema and rash present. No bruising. Neurological:      General: No focal deficit present. Mental Status: She is alert and oriented to person, place, and time. Sensory: No sensory deficit. Motor: No weakness.       Coordination: Coordination normal.      Gait: Gait abnormal.   Psychiatric: Mood and Affect: Mood normal.         Speech: Speech normal.         Behavior: Behavior normal.         Thought Content: Thought content normal.       No flowsheet data found. Lab Results   Component Value Date/Time    CHOL 203 08/12/2022 10:35 AM    CHOL 210 05/06/2021 11:07 AM    CHOL 211 10/15/2020 04:01 PM    TRIG 253 08/12/2022 10:35 AM    TRIG 230 05/06/2021 11:07 AM    TRIG 288 10/15/2020 04:01 PM    HDL 45 08/12/2022 10:35 AM    HDL 45 05/06/2021 11:07 AM    HDL 41 10/15/2020 04:01 PM    LDLCALC 107 08/12/2022 10:35 AM    LDLCALC 119 05/06/2021 11:07 AM    LDLCALC 112 10/15/2020 04:01 PM    GLUCOSE 101 01/09/2023 02:08 PM    GLUCOSE 96 12/01/2011 09:45 AM    LABA1C 5.6 08/12/2022 10:35 AM    LABA1C 5.8 04/30/2021 01:53 PM    LABA1C 6.1 10/15/2020 04:01 PM       The ASCVD Risk score (Mike WAYNE, et al., 2019) failed to calculate for the following reasons:     The 2019 ASCVD risk score is only valid for ages 36 to 78    Immunization History   Administered Date(s) Administered    COVID-19, PFIZER PURPLE top, DILUTE for use, (age 15 y+), 30mcg/0.3mL 01/23/2021, 02/13/2021, 11/19/2021    Influenza, FLUAD, (age 72 y+), Adjuvanted, 0.5mL 10/01/2020, 10/26/2021    Influenza, High Dose (Fluzone 65 yrs and older) 10/06/2015, 12/23/2016, 12/18/2017, 10/08/2018    Pneumococcal Conjugate 13-valent ([id number]) 12/23/2016    Pneumococcal Polysaccharide ([id number]) 05/17/2011    Tdap (Boostrix, Adacel) 12/18/2017    Zoster Recombinant (Shingrix) 11/06/2020       Health Maintenance   Topic Date Due    Colorectal Cancer Screen  09/18/2019    Shingles vaccine (2 of 2) 01/01/2021    COVID-19 Vaccine (4 - Booster for Pfizer series) 01/14/2022    Annual Wellness Visit (AWV)  05/24/2023    Depression Screen  08/08/2023    DTaP/Tdap/Td vaccine (2 - Td or Tdap) 12/18/2027    DEXA (modify frequency per FRAX score)  Completed    Flu vaccine  Completed    Pneumococcal 65+ years Vaccine  Completed    Hepatitis A vaccine  Aged Out Hib vaccine  Aged Out    Meningococcal (ACWY) vaccine  Aged Out       Assessment & Plan     Primary hypertension  Patient denies headaches, dizziness with position changes. There is no chest pain shortness of breath or lower extremity edema. No carotid bruits JVD, heart murmurs. Heart sounds normal and regular. -     CBC with Auto Differential; Future  -     Comprehensive Metabolic Panel; Future    Obesity, Class III, BMI 40-49.9 (morbid obesity) ([facility] [address] /hypothyroidism  Patient's weight loss center is requesting advice on reasonable weight loss goal.  Advised patient reasonable goal over the next 6 months would be 30 pound weight loss to bring her weight down to around 185 pounds. -     TSH; Future  -     T4, Free; Future      Candidiasis, intertrigo  Examination with MA present in the room. Mild erythremia to the abdominal fold and leg folds with bilateral groin. Patient reports she has been using medications for 2 days. Advised patient continue with ketoconazole ointment. Apply daily after washing and drying thoroughly. Refill placed for patient's Claritin that she takes for seasonal allergies.     Return in about 6 months (around 7/9/2023) for Follow up with PCP.         --Manish Garcia, APRN - CNP Time based billing

## 2024-01-22 ENCOUNTER — TELEPHONE (OUTPATIENT)
Dept: FAMILY MEDICINE CLINIC | Age: 84
End: 2024-01-22

## 2024-01-22 ENCOUNTER — OFFICE VISIT (OUTPATIENT)
Dept: FAMILY MEDICINE CLINIC | Age: 84
End: 2024-01-22
Payer: MEDICARE

## 2024-01-22 VITALS — BODY MASS INDEX: 40.59 KG/M2 | OXYGEN SATURATION: 94 % | HEART RATE: 99 BPM | WEIGHT: 214.8 LBS

## 2024-01-22 DIAGNOSIS — N30.01 ACUTE CYSTITIS WITH HEMATURIA: Primary | ICD-10-CM

## 2024-01-22 DIAGNOSIS — R30.0 DYSURIA: Primary | ICD-10-CM

## 2024-01-22 DIAGNOSIS — R39.15 URINARY URGENCY: ICD-10-CM

## 2024-01-22 LAB
BILIRUBIN, POC: ABNORMAL
BLOOD URINE, POC: 200
CLARITY, POC: ABNORMAL
COLOR, POC: YELLOW
GLUCOSE URINE, POC: ABNORMAL
KETONES, POC: 5
LEUKOCYTE EST, POC: 125
NITRITE, POC: ABNORMAL
PH, POC: 6
PROTEIN, POC: 100
SPECIFIC GRAVITY, POC: 1.02
UROBILINOGEN, POC: 1

## 2024-01-22 PROCEDURE — 99213 OFFICE O/P EST LOW 20 MIN: CPT

## 2024-01-22 PROCEDURE — 1123F ACP DISCUSS/DSCN MKR DOCD: CPT

## 2024-01-22 PROCEDURE — 81002 URINALYSIS NONAUTO W/O SCOPE: CPT

## 2024-01-22 RX ORDER — SULFAMETHOXAZOLE AND TRIMETHOPRIM 800; 160 MG/1; MG/1
1 TABLET ORAL 2 TIMES DAILY
Qty: 14 TABLET | Refills: 0 | Status: SHIPPED | OUTPATIENT
Start: 2024-01-22 | End: 2024-01-29

## 2024-01-22 ASSESSMENT — ENCOUNTER SYMPTOMS
SHORTNESS OF BREATH: 0
DIARRHEA: 0
VOMITING: 0
NAUSEA: 0
COUGH: 0

## 2024-01-22 NOTE — PROGRESS NOTES
Glucose, UA POC neg     Bilirubin, UA neg     Ketones, UA 5     Spec Grav, UA 1.025     Blood, UA      pH, UA 6.0     Protein, UA      Urobilinogen, UA 1     Leukocytes,      Nitrite, UA pos          ASSESSMENT/PLAN:  1. Acute cystitis with hematuria  -     sulfamethoxazole-trimethoprim (BACTRIM DS;SEPTRA DS) 800-160 MG per tablet; Take 1 tablet by mouth 2 times daily for 7 days, Disp-14 tablet, R-0Normal    - Complete full course of antibiotics.  - You may take Tylenol as needed for discomfort or fever.  - Increase fluid intake.  - Follow up if symptoms worsen or do not improve.      Electronically signed by CRISTOPHER Ghotra CNP on 1/22/24 at 1:57 PM EST

## 2024-01-22 NOTE — TELEPHONE ENCOUNTER
Her urine culture showed P. Mirabilis UTI sensitive to quinolones which means her 5 day course of cipro would have treated her UTI. If her symptoms are persisting or worsening then she needs seen by urology for further evaluation and potential further management. There is likely another underlying condition contributing to urinary complaints. Please help her schedule a visit with urogynecology for further evaluation and potential further management. She would need to be seen by a provider here and have repeat urine testing done to continue with any further or subsequent treatment.

## 2024-01-22 NOTE — TELEPHONE ENCOUNTER
----- Message from Halima Arriaga sent at 1/22/2024  9:28 AM EST -----  Subject: Message to Provider    QUESTIONS  Information for Provider? Patient states she was treated for a UTI around   10 days ago. She states the medication helped and she was feeling much   better. Two days ago now she started noticing urinary urgency and dysuria   again, that keeps getting worse. She is wondering if she could be treated   again without coming into the office since she was just treated 2 weeks   ago at the ER and also had an office visit.  ---------------------------------------------------------------------------  --------------  CALL BACK INFO  [phone]; OK to leave message on voicemail  ---------------------------------------------------------------------------  --------------  SCRIPT ANSWERS  Relationship to Patient? Self  Have you recently (14 days) seen a provider for this problem? Yes

## 2024-01-23 NOTE — TELEPHONE ENCOUNTER
Pt aware, was seen in walk in yesterday for symptoms. Pt aware of referral. Will schedule follow up. Pt currently taking bactim to help with uti sx.

## 2024-02-20 ENCOUNTER — OFFICE VISIT (OUTPATIENT)
Dept: OBGYN CLINIC | Age: 84
End: 2024-02-20
Payer: MEDICARE

## 2024-02-20 VITALS
BODY MASS INDEX: 40.78 KG/M2 | HEIGHT: 61 IN | WEIGHT: 216 LBS | HEART RATE: 86 BPM | DIASTOLIC BLOOD PRESSURE: 70 MMHG | SYSTOLIC BLOOD PRESSURE: 124 MMHG

## 2024-02-20 DIAGNOSIS — R35.0 URINARY FREQUENCY: ICD-10-CM

## 2024-02-20 DIAGNOSIS — N39.0 RECURRENT UTI: Primary | ICD-10-CM

## 2024-02-20 DIAGNOSIS — E66.01 OBESITY, CLASS III, BMI 40-49.9 (MORBID OBESITY) (HCC): ICD-10-CM

## 2024-02-20 PROCEDURE — 1123F ACP DISCUSS/DSCN MKR DOCD: CPT | Performed by: OBSTETRICS & GYNECOLOGY

## 2024-02-20 PROCEDURE — 3074F SYST BP LT 130 MM HG: CPT | Performed by: OBSTETRICS & GYNECOLOGY

## 2024-02-20 PROCEDURE — 99213 OFFICE O/P EST LOW 20 MIN: CPT | Performed by: OBSTETRICS & GYNECOLOGY

## 2024-02-20 PROCEDURE — 3078F DIAST BP <80 MM HG: CPT | Performed by: OBSTETRICS & GYNECOLOGY

## 2024-02-20 RX ORDER — CLOTRIMAZOLE AND BETAMETHASONE DIPROPIONATE 10; .64 MG/G; MG/G
CREAM TOPICAL
Qty: 1 EACH | Refills: 2 | Status: SHIPPED | OUTPATIENT
Start: 2024-02-20 | End: 2024-02-21 | Stop reason: SDUPTHER

## 2024-02-20 ASSESSMENT — PATIENT HEALTH QUESTIONNAIRE - PHQ9
SUM OF ALL RESPONSES TO PHQ QUESTIONS 1-9: 0
1. LITTLE INTEREST OR PLEASURE IN DOING THINGS: 0
SUM OF ALL RESPONSES TO PHQ9 QUESTIONS 1 & 2: 0
SUM OF ALL RESPONSES TO PHQ QUESTIONS 1-9: 0

## 2024-02-20 NOTE — PROGRESS NOTES
History:   Procedure Laterality Date    ABSCESS DRAINAGE Left 2016    Abscess abdominal wall LLQ    BREAST BIOPSY Right 08/10/2017    DR Salazar Kahn    BREAST SURGERY N/A 2016    ABDOMINAL INCISION AND DRAINAGE performed by Yany Monson MD at [address] [place] [address]  04/15/2005    COLONOSCOPY  2009    DR Luann Leary    COLONOSCOPY  2014    diverticulosis, polyps x 3 (DR HATCH)    DILATION AND CURETTAGE OF UTERUS      #1    DILATION AND CURETTAGE OF UTERUS      #2    HERNIA REPAIR      DR Tami Coello HIP SURGERY Left 2018    JOINT REPLACEMENT Left     DR SANCHEZ, hip    MASTECTOMY, PARTIAL Right     [place] REVISE [facility]IAN N/CARPAL TUNNEL SURG Left 5/3/2018    LEFT WRIST CARPAL TUNNEL RELEASE performed by Katalina Medina MD at [address] [place] [address] N/CARPAL TUNNEL SURG Right 2018    RIGHT WRIST CARPAL TUNNEL RELEASE performed by Katalina Medina MD at [address] [place] [address]       Family History   Problem Relation Age of Onset    Heart Disease Father     Heart Attack Father     Diabetes Mother      Social History     Socioeconomic History    Marital status:      Spouse name: Alma Rosa Pickard Number of children: 2    Years of education: 12    Highest education level: Not on file   Occupational History    Occupation: Retired     Comment: former teacher   Social Needs    Financial resource strain: Not on file    Food insecurity     Worry: Not on file     Inability: Not on file   [employer] needs     Medical: Not on file     Non-medical: Not on file   Tobacco Use    Smoking status: Former Smoker     Packs/day: 1.50     Years: 21.00     Pack years: 31.50     Types: Cigarettes     Start date:      Last attempt to quit: 1980     Years since quittin.8    Smokeless tobacco: Never Used   Substance and Sexual Activity    Alcohol use: Yes     Comment: infrequent    Drug use: No    Sexual activity: Never     Partners: Male Comment: monogamous   Lifestyle    Physical activity     Days per week: Not on file     Minutes per session: Not on file    Stress: Not on file   Relationships    Social connections     Talks on phone: Not on file     Gets together: Not on file     Attends Voodoo service: Not on file     Active member of club or organization: Not on file     Attends meetings of clubs or organizations: Not on file     Relationship status: Not on file    Intimate partner violence     Fear of current or ex partner: Not on file     Emotionally abused: Not on file     Physically abused: Not on file     Forced sexual activity: Not on file   Other Topics Concern    Not on file   Social History Narrative    Living with spouse     Current Outpatient Medications on File Prior to Visit   Medication Sig Dispense Refill    nystatin ([address]) 191578 UNIT/GM powder apply to affected area twice a day 15 g 0    ketoconazole (NIZORAL) 2 % cream Apply topically twice daily for 2 - 4 weeks (or for 1 week after lesions have healed). 60 g 1    fluconazole (DIFLUCAN) 150 MG tablet Take 1 tablet by mouth once a week for 4 doses 4 tablet 0    ezetimibe (ZETIA) 10 MG tablet Take 1 tablet by mouth daily 90 tablet 0    triamterene-hydroCHLOROthiazide (DYAZIDE) 37.5-25 MG per capsule Take 1 capsule by mouth daily 90 capsule 1    ipratropium (ATROVENT) 0.06 % nasal spray 2 sprays by Nasal route 3 times daily 1 Bottle 5    levothyroxine (SYNTHROID) 50 MCG tablet Take 1 tablet by mouth Daily 90 tablet 3    metoprolol succinate (TOPROL XL) 25 MG extended release tablet Take 1 tablet by mouth daily Do not crush or chew. 90 tablet 3    loperamide (IMODIUM) 2 MG capsule Take 2 mg by mouth 4 times daily as needed for Diarrhea      acetaminophen (TYLENOL) 500 MG tablet Take 500 mg by mouth every 6 hours as needed for Pain      Multiple Vitamin (MULTI-VITAMIN PO) Take 1 tablet by mouth daily.        No current facility-administered medications on file Palpations: Abdomen is soft. Tenderness: There is no abdominal tenderness. There is no guarding or rebound. Musculoskeletal: Normal range of motion. Skin:     General: Skin is warm. Neurological:      Mental Status: She is alert and oriented to person, place, and time. Psychiatric:         Mood and Affect: Mood normal.         Behavior: Behavior normal.         Thought Content: Thought content normal.          Ortho Exam (If Applicable)      Assessment & Plan:      1. Essential hypertension  Blood pressure within normal limits today in the office. Continue current management    2. Hyperlipidemia, unspecified hyperlipidemia type  We will follow lab work over time    3. Hypothyroidism, unspecified type  Will follow lab work over time    4. Pre-diabetes  Will follow lab work over time    5. Gastroesophageal reflux disease, unspecified whether esophagitis present  No reported gastrointestinal complaints or change in appetite. Continue current management    6. Morbidly obese ([address]  Patient counseled on healthy dietary choices and the benefits of a lower salt and/or lower carbohydrate diet as appropriate. Patient also counseled on benefits of moderate intensity cardiovascular exercise for 20-30 minutes at least 3-5 days a week. Advice was given to make small changes over time, setting smaller achievable goals until recommended lifestyle changes are reached. 7. Generalized osteoarthritis    - Handicap Placard MISC; by Does not apply route DX: GENERALIZED OSTEOARTHRITIS (M15.9), HISTORY OF LEFT KNEE REPLACEMENT (U28.210)     EXPIRES: 10/2025  Dispense: 1 each; Refill: 0    8. Presence of left artificial knee joint    - Handicap Placard MISC; by Does not apply route DX: GENERALIZED OSTEOARTHRITIS (M15.9), HISTORY OF LEFT KNEE REPLACEMENT (G11.409)     EXPIRES: 10/2025  Dispense: 1 each; Refill: 0    9. History of colon polyps    - [address] Abhilash Greenfield MD, Gastroenterology, [place]    10.  Screening  used

## 2024-02-20 NOTE — PROGRESS NOTES
Ivy Read is a 83 y.o. female who presents here today for complaints of New Patient (Recurrent UTI)    C/o of urgency, frequency, incontinence, nocturia , patient believes she is getting recurrent UTI . No UA or culture done recently to confirm . Patient does have a history of recurrent UTI as seen per her lab records in 2023 and 2022 .   .      Vitals:  /70 (Site: Right Upper Arm)   Pulse 86   Ht 1.549 m (5' 1\")   Wt 98 kg (216 lb)   LMP  (LMP Unknown)   BMI 40.81 kg/m²   Allergies:  Fenofibrate, Statins, and Pcn [penicillins]  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    Fibrocystic disease of right breast     Generalized osteoarthritis 08/04/2014    GERD (gastroesophageal reflux disease)     History of breast cancer in female 08/04/2014    History of colon polyps 05/23/2022    HTN (hypertension) 08/04/2014    Hyperlipidemia     Hypertension     Hypothyroidism 12/02/2016    Insomnia 06/16/2015    Macula lutea degeneration     Moderate persistent asthma 11/25/2020    Moderate persistent asthma 11/25/2020    MRSA (methicillin resistant Staphylococcus aureus) infection     MRSA infection 11/2016    LLQ abdominal wall    Obesity     Postmenopausal 02/09/2017    Pre-diabetes 07/23/2019    Scoliosis     Subclinical hypothyroidism 05/06/2014    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

## 2024-02-21 ENCOUNTER — TELEPHONE (OUTPATIENT)
Dept: OBGYN CLINIC | Age: 84
End: 2024-02-21

## 2024-02-21 DIAGNOSIS — R35.0 URINARY FREQUENCY: ICD-10-CM

## 2024-02-21 LAB
BACTERIA URNS QL MICRO: NEGATIVE /HPF
BILIRUB UR QL STRIP: NEGATIVE
CLARITY UR: CLEAR
COLOR UR: YELLOW
EPI CELLS #/AREA URNS AUTO: NORMAL /HPF (ref 0–5)
GLUCOSE UR STRIP-MCNC: NEGATIVE MG/DL
HGB UR QL STRIP: NEGATIVE
HYALINE CASTS #/AREA URNS AUTO: NORMAL /HPF (ref 0–5)
KETONES UR STRIP-MCNC: NEGATIVE MG/DL
LEUKOCYTE ESTERASE UR QL STRIP: ABNORMAL
NITRITE UR QL STRIP: NEGATIVE
PH UR STRIP: 6.5 [PH] (ref 5–9)
PROT UR STRIP-MCNC: NEGATIVE MG/DL
RBC #/AREA URNS AUTO: NORMAL /HPF (ref 0–5)
SP GR UR STRIP: 1.01 (ref 1–1.03)
UROBILINOGEN UR STRIP-ACNC: 0.2 E.U./DL
WBC #/AREA URNS AUTO: NORMAL /HPF (ref 0–5)

## 2024-02-21 RX ORDER — CLOTRIMAZOLE AND BETAMETHASONE DIPROPIONATE 10; .64 MG/G; MG/G
CREAM TOPICAL
Qty: 1 EACH | Refills: 2 | Status: SHIPPED | OUTPATIENT
Start: 2024-02-21 | End: 2024-02-26 | Stop reason: SDUPTHER

## 2024-02-21 NOTE — TELEPHONE ENCOUNTER
Would need to use the refills I prescribed. Hopefully she wont need to take the medication for prolonged period of time.

## 2024-02-21 NOTE — TELEPHONE ENCOUNTER
Patient states that she will need more of the Lotrisone cream, the tube she was prescribed yesterday will not be enough for the rash under her breast and stomach area, she has asked if a new Rx can be sent with adjustments so she can get enough to treat herself. Thank you

## 2024-02-22 LAB — BACTERIA UR CULT: NORMAL

## 2024-02-23 NOTE — TELEPHONE ENCOUNTER
Pt called today stating that she is now out of the lotrisone. Rash is still there it is on her breast and stomach.

## 2024-02-26 NOTE — TELEPHONE ENCOUNTER
Pt called in , was not able to use the refills due to insurance . Please sign and send for the new rx if appropriate. Bigger size , per the pt request to cover the area that is infected.

## 2024-02-27 RX ORDER — CLOTRIMAZOLE AND BETAMETHASONE DIPROPIONATE 10; .64 MG/G; MG/G
CREAM TOPICAL
Qty: 45 G | Refills: 2 | Status: SHIPPED | OUTPATIENT
Start: 2024-02-27

## 2024-03-11 DIAGNOSIS — E78.2 MIXED HYPERLIPIDEMIA: Chronic | ICD-10-CM

## 2024-03-11 RX ORDER — EZETIMIBE 10 MG/1
10 TABLET ORAL DAILY
Qty: 90 TABLET | Refills: 1 | Status: SHIPPED | OUTPATIENT
Start: 2024-03-11

## 2024-03-11 NOTE — TELEPHONE ENCOUNTER
Comments:     Last Office Visit (last PCP visit):   12/11/2023    Next Visit Date:  Future Appointments   Date Time Provider Department Center   6/11/2024 11:30 AM Stephon Oglesby MD [facility]       **If hasn't been seen in over a year OR hasn't followed up according to last diabetes/ADHD visit, make appointment for patient before sending refill to provider.    Rx requested:  Requested Prescriptions     Pending Prescriptions Disp Refills    ezetimibe (ZETIA) 10 MG tablet [Pharmacy Med Name: Ezetimibe Oral Tablet 10 MG] 90 tablet 0     Sig: TAKE 1 TABLET BY MOUTH ONCE DAILY

## 2024-03-11 NOTE — TELEPHONE ENCOUNTER
----- Message from Kiara Medina sent at 3/11/2024  2:47 PM EDT -----  Subject: Refill Request    QUESTIONS  Name of Medication? ezetimibe (ZETIA) 10 MG tablet  Patient-reported dosage and instructions? 1 tablet daily  How many days do you have left? 1  Preferred Pharmacy? [id number]  Pharmacy phone number (if available)? [phone]  ---------------------------------------------------------------------------  --------------  CALL BACK INFO  What is the best way for the office to contact you? OK to leave message on   voicemail  Preferred Call Back Phone Number? [phone]  ---------------------------------------------------------------------------  --------------  SCRIPT ANSWERS  Relationship to Patient? Self

## 2024-04-18 DIAGNOSIS — E78.2 MIXED HYPERLIPIDEMIA: Chronic | ICD-10-CM

## 2024-04-18 DIAGNOSIS — E03.9 HYPOTHYROIDISM, UNSPECIFIED TYPE: Chronic | ICD-10-CM

## 2024-04-18 RX ORDER — EZETIMIBE 10 MG/1
10 TABLET ORAL DAILY
Qty: 90 TABLET | Refills: 1 | Status: SHIPPED | OUTPATIENT
Start: 2024-04-18

## 2024-04-18 RX ORDER — LEVOTHYROXINE SODIUM 0.05 MG/1
50 TABLET ORAL DAILY
Qty: 90 TABLET | Refills: 1 | Status: SHIPPED | OUTPATIENT
Start: 2024-04-18

## 2024-04-18 RX ORDER — TRIAMTERENE AND HYDROCHLOROTHIAZIDE 37.5; 25 MG/1; MG/1
1 CAPSULE ORAL DAILY
Qty: 90 CAPSULE | Refills: 1 | Status: SHIPPED | OUTPATIENT
Start: 2024-04-18

## 2024-04-18 NOTE — TELEPHONE ENCOUNTER
Comments:     Last Office Visit (last PCP visit):   12/11/2023    Next Visit Date:  Future Appointments   Date Time Provider Department Center   6/11/2024 11:30 AM Stephon Oglesby MD [facility]  [facility]       **If hasn't been seen in over a year OR hasn't followed up according to last diabetes/ADHD visit, make appointment for patient before sending refill to provider.    Rx requested:  Requested Prescriptions     Pending Prescriptions Disp Refills    ezetimibe (ZETIA) 10 MG tablet 90 tablet 1     Sig: Take 1 tablet by mouth daily    levothyroxine (SYNTHROID) 50 MCG tablet 90 tablet 1     Sig: Take 1 tablet by mouth daily    triamterene-hydroCHLOROthiazide (DYAZIDE) 37.5-25 MG per capsule 90 capsule 1     Sig: Take 1 capsule by mouth daily

## 2024-04-22 DIAGNOSIS — I10 ESSENTIAL HYPERTENSION: Chronic | ICD-10-CM

## 2024-04-22 RX ORDER — METOPROLOL SUCCINATE 25 MG/1
25 TABLET, EXTENDED RELEASE ORAL DAILY
Qty: 90 TABLET | Refills: 1 | Status: SHIPPED | OUTPATIENT
Start: 2024-04-22

## 2024-04-22 NOTE — TELEPHONE ENCOUNTER
Comments:     Last Office Visit (last PCP visit):   12/11/2023    Next Visit Date:  Future Appointments   Date Time Provider Department Center   6/11/2024 11:30 AM Stephon Oglesby MD [facility] [place]  [facility]       **If hasn't been seen in over a year OR hasn't followed up according to last diabetes/ADHD visit, make appointment for patient before sending refill to provider.    Rx requested:  Requested Prescriptions     Pending Prescriptions Disp Refills    metoprolol succinate (TOPROL XL) 25 MG extended release tablet 90 tablet 1     Sig: Take 1 tablet by mouth daily

## 2024-07-29 ENCOUNTER — OFFICE VISIT (OUTPATIENT)
Dept: FAMILY MEDICINE CLINIC | Age: 84
End: 2024-07-29
Payer: MEDICARE

## 2024-07-29 VITALS
HEIGHT: 61 IN | TEMPERATURE: 98.3 F | HEART RATE: 73 BPM | BODY MASS INDEX: 39.46 KG/M2 | DIASTOLIC BLOOD PRESSURE: 76 MMHG | SYSTOLIC BLOOD PRESSURE: 136 MMHG | WEIGHT: 209 LBS | OXYGEN SATURATION: 95 %

## 2024-07-29 DIAGNOSIS — I10 PRIMARY HYPERTENSION: Chronic | ICD-10-CM

## 2024-07-29 DIAGNOSIS — L30.4 INTERTRIGO: Primary | ICD-10-CM

## 2024-07-29 PROCEDURE — 99213 OFFICE O/P EST LOW 20 MIN: CPT | Performed by: FAMILY MEDICINE

## 2024-07-29 PROCEDURE — 1123F ACP DISCUSS/DSCN MKR DOCD: CPT | Performed by: FAMILY MEDICINE

## 2024-07-29 PROCEDURE — 3078F DIAST BP <80 MM HG: CPT | Performed by: FAMILY MEDICINE

## 2024-07-29 PROCEDURE — 3075F SYST BP GE 130 - 139MM HG: CPT | Performed by: FAMILY MEDICINE

## 2024-07-29 RX ORDER — TRIAMTERENE AND HYDROCHLOROTHIAZIDE 37.5; 25 MG/1; MG/1
1 CAPSULE ORAL DAILY
Qty: 90 CAPSULE | Refills: 1 | Status: SHIPPED | OUTPATIENT
Start: 2024-07-29

## 2024-07-29 RX ORDER — NYSTATIN 100000 [USP'U]/G
POWDER TOPICAL 3 TIMES DAILY
Qty: 60 G | Refills: 3 | Status: SHIPPED | OUTPATIENT
Start: 2024-07-29

## 2024-07-29 RX ORDER — NYSTATIN 100000 U/G
CREAM TOPICAL 2 TIMES DAILY
Qty: 60 G | Refills: 3 | Status: SHIPPED | OUTPATIENT
Start: 2024-07-29

## 2024-07-29 SDOH — ECONOMIC STABILITY: FOOD INSECURITY: WITHIN THE PAST 12 MONTHS, YOU WORRIED THAT YOUR FOOD WOULD RUN OUT BEFORE YOU GOT MONEY TO BUY MORE.: NEVER TRUE

## 2024-07-29 SDOH — ECONOMIC STABILITY: FOOD INSECURITY: WITHIN THE PAST 12 MONTHS, THE FOOD YOU BOUGHT JUST DIDN'T LAST AND YOU DIDN'T HAVE MONEY TO GET MORE.: NEVER TRUE

## 2024-07-29 SDOH — ECONOMIC STABILITY: INCOME INSECURITY: HOW HARD IS IT FOR YOU TO PAY FOR THE VERY BASICS LIKE FOOD, HOUSING, MEDICAL CARE, AND HEATING?: NOT HARD AT ALL

## 2024-07-29 ASSESSMENT — ENCOUNTER SYMPTOMS
SHORTNESS OF BREATH: 0
ABDOMINAL PAIN: 0

## 2024-07-29 NOTE — ASSESSMENT & PLAN NOTE
Patient advised to wear loose, breathable fabric clothing. Instructions given to allow areas affected to be out to air. Will use nystatin cream and powder. INB would refer to dermatology for further management.

## 2024-07-29 NOTE — PROGRESS NOTES
Ivy Read (: 1940) is a 84 y.o. female, Established patient, who presents today for:    Chief Complaint   Patient presents with    Rash     Pt has a rash, under her breast and under her stomach line. She states its been there for awhile, she was taking medication for this and using cream but she stopped for awhile and now she believes she needs something again          Assessment & Plan     1. Intertrigo  Assessment & Plan:  Patient advised to wear loose, breathable fabric clothing. Instructions given to allow areas affected to be out to air. Will use nystatin cream and powder. INB would refer to dermatology for further management.    Orders:  -     nystatin (MYCOSTATIN) 346091 UNIT/GM cream; Apply topically in the morning and at bedtime Apply topically 2 times daily., Topical, 2 times daily Starting 2024, Disp-60 g, R-3, Normal  -     nystatin (MYCOSTATIN) 615645 UNIT/GM powder; Apply topically 3 times daily Apply 3 times daily., Topical, 3 TIMES DAILY Starting 2024, Disp-60 g, R-3, Normal  2. Primary hypertension  -     triamterene-hydroCHLOROthiazide (DYAZIDE) 37.5-25 MG per capsule; Take 1 capsule by mouth daily, Disp-90 capsule, R-1Normal           Return for Annual Wellness Visit in 1-2 Months.       Subjective     HPI    Patient presents for acute visit regarding rash.  Brightly erythematous rash noted beneath both breasts, under abdominal pannus, and in bilateral groin folds over the past 2-3+ months.  Patient reports similar rash treated in the past with cream and powder.  She reports running out of this medication and would like to restart again for treatment. There are no areas of broken skin, weeping drainage, or bleeding. She denies any pain with this rash. She has tried no relieving measures at home.     Review of Systems   Constitutional:  Negative for chills, diaphoresis and fever.   Respiratory:  Negative for shortness of breath.    Cardiovascular:  Negative

## 2024-09-28 ENCOUNTER — HOSPITAL ENCOUNTER (OUTPATIENT)
Dept: LAB | Age: 84
Discharge: HOME | End: 2024-09-28
Payer: MEDICARE

## 2024-09-28 DIAGNOSIS — E78.2 MIXED HYPERLIPIDEMIA: Chronic | ICD-10-CM

## 2024-09-28 DIAGNOSIS — E66.01 CLASS 2 SEVERE OBESITY WITH SERIOUS COMORBIDITY AND BODY MASS INDEX (BMI) OF 39.0 TO 39.9 IN ADULT, UNSPECIFIED OBESITY TYPE: ICD-10-CM

## 2024-09-28 DIAGNOSIS — R73.03 PRE-DIABETES: ICD-10-CM

## 2024-09-28 DIAGNOSIS — I10 PRIMARY HYPERTENSION: Chronic | ICD-10-CM

## 2024-09-28 DIAGNOSIS — M81.0 AGE-RELATED OSTEOPOROSIS WITHOUT CURRENT PATHOLOGICAL FRACTURE: ICD-10-CM

## 2024-09-28 DIAGNOSIS — E03.9 HYPOTHYROIDISM, UNSPECIFIED TYPE: Chronic | ICD-10-CM

## 2024-09-28 LAB
ALBUMIN SERPL-MCNC: 4 G/DL (ref 3.5–4.6)
ALP SERPL-CCNC: 80 U/L (ref 40–130)
ALT SERPL-CCNC: 13 U/L (ref 0–33)
ANION GAP SERPL CALCULATED.3IONS-SCNC: 7 MEQ/L (ref 9–15)
AST SERPL-CCNC: 14 U/L (ref 0–35)
BASOPHILS # BLD: 0.1 K/UL (ref 0–0.2)
BASOPHILS NFR BLD: 0.9 %
BILIRUB SERPL-MCNC: 0.4 MG/DL (ref 0.2–0.7)
BUN SERPL-MCNC: 24 MG/DL (ref 8–23)
CALCIUM SERPL-MCNC: 9.8 MG/DL (ref 8.5–9.9)
CHLORIDE SERPL-SCNC: 102 MEQ/L (ref 95–107)
CHOLEST SERPL-MCNC: 179 MG/DL (ref 0–199)
CO2 SERPL-SCNC: 33 MEQ/L (ref 20–31)
CREAT SERPL-MCNC: 0.68 MG/DL (ref 0.5–0.9)
EOSINOPHIL # BLD: 0.2 K/UL (ref 0–0.7)
EOSINOPHIL NFR BLD: 2.8 %
ERYTHROCYTE [DISTWIDTH] IN BLOOD BY AUTOMATED COUNT: 13.6 % (ref 11.5–14.5)
GLOBULIN SER CALC-MCNC: 3 G/DL (ref 2.3–3.5)
GLUCOSE SERPL-MCNC: 95 MG/DL (ref 70–99)
HCT VFR BLD AUTO: 40.7 % (ref 37–47)
HDLC SERPL-MCNC: 40 MG/DL (ref 40–59)
HGB BLD-MCNC: 12.7 G/DL (ref 12–16)
LDLC SERPL CALC-MCNC: 98 MG/DL (ref 0–129)
LYMPHOCYTES # BLD: 0.9 K/UL (ref 1–4.8)
LYMPHOCYTES NFR BLD: 15 %
MCH RBC QN AUTO: 27.7 PG (ref 27–31.3)
MCHC RBC AUTO-ENTMCNC: 31.2 % (ref 33–37)
MCV RBC AUTO: 88.7 FL (ref 79.4–94.8)
MONOCYTES # BLD: 0.6 K/UL (ref 0.2–0.8)
MONOCYTES NFR BLD: 9.6 %
NEUTROPHILS # BLD: 4.1 K/UL (ref 1.4–6.5)
NEUTS SEG NFR BLD: 71.5 %
PLATELET # BLD AUTO: 299 K/UL (ref 130–400)
POTASSIUM SERPL-SCNC: 3.6 MEQ/L (ref 3.4–4.9)
PROT SERPL-MCNC: 7 G/DL (ref 6.3–8)
RBC # BLD AUTO: 4.59 M/UL (ref 4.2–5.4)
SODIUM SERPL-SCNC: 142 MEQ/L (ref 135–144)
T4 FREE SERPL-MCNC: 1.23 NG/DL (ref 0.84–1.68)
TRIGL SERPL-MCNC: 203 MG/DL (ref 0–150)
TSH SERPL-MCNC: 3.68 UIU/ML (ref 0.44–3.86)
WBC # BLD AUTO: 5.7 K/UL (ref 4.8–10.8)

## 2024-09-28 PROCEDURE — 84439 ASSAY OF FREE THYROXINE: CPT

## 2024-09-28 PROCEDURE — 80061 LIPID PANEL: CPT

## 2024-09-28 PROCEDURE — 82306 VITAMIN D 25 HYDROXY: CPT

## 2024-09-28 PROCEDURE — 83036 HEMOGLOBIN GLYCOSYLATED A1C: CPT

## 2024-09-28 PROCEDURE — 36415 COLL VENOUS BLD VENIPUNCTURE: CPT

## 2024-09-28 PROCEDURE — 84443 ASSAY THYROID STIM HORMONE: CPT

## 2024-09-28 PROCEDURE — 85025 COMPLETE CBC W/AUTO DIFF WBC: CPT

## 2024-09-28 PROCEDURE — 80053 COMPREHEN METABOLIC PANEL: CPT

## 2024-09-29 LAB
ESTIMATED AVERAGE GLUCOSE: 120 MG/DL
HBA1C MFR BLD: 5.8 % (ref 4–6)
VITAMIN D 25-HYDROXY: 33.2 NG/ML (ref 30–100)

## 2024-09-30 ENCOUNTER — OFFICE VISIT (OUTPATIENT)
Dept: FAMILY MEDICINE CLINIC | Age: 84
End: 2024-09-30

## 2024-09-30 VITALS
BODY MASS INDEX: 39.65 KG/M2 | OXYGEN SATURATION: 94 % | DIASTOLIC BLOOD PRESSURE: 80 MMHG | HEIGHT: 61 IN | WEIGHT: 210 LBS | SYSTOLIC BLOOD PRESSURE: 122 MMHG | HEART RATE: 76 BPM

## 2024-09-30 VITALS
HEART RATE: 67 BPM | TEMPERATURE: 97.9 F | DIASTOLIC BLOOD PRESSURE: 74 MMHG | SYSTOLIC BLOOD PRESSURE: 130 MMHG | HEIGHT: 61 IN | WEIGHT: 210 LBS | BODY MASS INDEX: 39.65 KG/M2 | OXYGEN SATURATION: 95 %

## 2024-09-30 DIAGNOSIS — M25.561 ACUTE PAIN OF RIGHT KNEE: ICD-10-CM

## 2024-09-30 DIAGNOSIS — H61.22 IMPACTED CERUMEN OF LEFT EAR: Primary | ICD-10-CM

## 2024-09-30 DIAGNOSIS — Z00.00 MEDICARE ANNUAL WELLNESS VISIT, SUBSEQUENT: Primary | ICD-10-CM

## 2024-09-30 DIAGNOSIS — R23.8 SCALP IRRITATION: ICD-10-CM

## 2024-09-30 DIAGNOSIS — Z23 NEED FOR VACCINATION: ICD-10-CM

## 2024-09-30 DIAGNOSIS — L60.8 TOENAIL DEFORMITY: ICD-10-CM

## 2024-09-30 DIAGNOSIS — Z96.653 STATUS POST BILATERAL KNEE REPLACEMENTS: ICD-10-CM

## 2024-09-30 RX ORDER — KETOCONAZOLE 20 MG/ML
SHAMPOO TOPICAL
Qty: 120 ML | Refills: 0 | Status: SHIPPED | OUTPATIENT
Start: 2024-09-30

## 2024-09-30 ASSESSMENT — PATIENT HEALTH QUESTIONNAIRE - PHQ9
2. FEELING DOWN, DEPRESSED OR HOPELESS: NOT AT ALL
SUM OF ALL RESPONSES TO PHQ QUESTIONS 1-9: 0
9. THOUGHTS THAT YOU WOULD BE BETTER OFF DEAD, OR OF HURTING YOURSELF: NOT AT ALL
5. POOR APPETITE OR OVEREATING: NOT AT ALL
SUM OF ALL RESPONSES TO PHQ QUESTIONS 1-9: 0
7. TROUBLE CONCENTRATING ON THINGS, SUCH AS READING THE NEWSPAPER OR WATCHING TELEVISION: NOT AT ALL
SUM OF ALL RESPONSES TO PHQ9 QUESTIONS 1 & 2: 0
4. FEELING TIRED OR HAVING LITTLE ENERGY: NOT AT ALL
8. MOVING OR SPEAKING SO SLOWLY THAT OTHER PEOPLE COULD HAVE NOTICED. OR THE OPPOSITE, BEING SO FIGETY OR RESTLESS THAT YOU HAVE BEEN MOVING AROUND A LOT MORE THAN USUAL: NOT AT ALL
6. FEELING BAD ABOUT YOURSELF - OR THAT YOU ARE A FAILURE OR HAVE LET YOURSELF OR YOUR FAMILY DOWN: NOT AT ALL
SUM OF ALL RESPONSES TO PHQ QUESTIONS 1-9: 0
3. TROUBLE FALLING OR STAYING ASLEEP: NOT AT ALL
10. IF YOU CHECKED OFF ANY PROBLEMS, HOW DIFFICULT HAVE THESE PROBLEMS MADE IT FOR YOU TO DO YOUR WORK, TAKE CARE OF THINGS AT HOME, OR GET ALONG WITH OTHER PEOPLE: NOT DIFFICULT AT ALL
SUM OF ALL RESPONSES TO PHQ QUESTIONS 1-9: 0
1. LITTLE INTEREST OR PLEASURE IN DOING THINGS: NOT AT ALL

## 2024-09-30 ASSESSMENT — ENCOUNTER SYMPTOMS
COUGH: 0
SORE THROAT: 0
SHORTNESS OF BREATH: 0
RHINORRHEA: 0
WHEEZING: 0

## 2024-09-30 NOTE — PROGRESS NOTES
Subjective:      Patient ID: Ivy Read is a 84 y.o. female who presents today for:  Chief Complaint   Patient presents with    Ear Fullness     States she just want to be check if her ears are clogged and would like to know if is contributing to her hearing.          Ear Fullness   Affected ear: left. This is a new problem. The current episode started in the past 7 days. The problem occurs constantly. The problem has been unchanged. There has been no fever. The patient is experiencing no pain. Pertinent negatives include no coughing, ear discharge, headaches, hearing loss, neck pain, rash, rhinorrhea or sore throat. She has tried nothing for the symptoms.       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    Fibrocystic disease of right breast     Generalized osteoarthritis 08/04/2014    GERD (gastroesophageal reflux disease)     History of breast cancer in female 08/04/2014    History of colon polyps 05/23/2022    HTN (hypertension) 08/04/2014    Hyperlipidemia     Hypertension     Hypothyroidism 12/02/2016    Insomnia 06/16/2015    Macula lutea degeneration     Moderate persistent asthma 11/25/2020    Moderate persistent asthma 11/25/2020    MRSA (methicillin resistant Staphylococcus aureus) infection     MRSA infection 11/2016    LLQ abdominal wall    Obesity     Postmenopausal 02/09/2017    Pre-diabetes 07/23/2019    Scoliosis     Subclinical hypothyroidism 05/06/2014    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

## 2024-09-30 NOTE — PROGRESS NOTES
Medicare Annual Wellness Visit    Ivy Read is here for Medicare AWV (Pt states she's dealing an itchy head, and ears, pt had a fall yesterday, she tripped on her pant leg)    Assessment & Plan   1. Medicare annual wellness visit, subsequent  2. Need for vaccination  Comments:  Patient advised to go to local pharmacy for shingles, RSV, and COVID-19 booster vaccination.  Orders:  -     Influenza, FLUAD Trivalent, (age 65 y+), IM, Preservative Free, 0.5mL  3. Scalp irritation  Comments:  Will treat empirically for seborrheic dermatitis. INB patient instructed to schedule F/U with dermatology office.  Orders:  -     Amb External Referral To Dermatology  -     ketoconazole (NIZORAL) 2 % shampoo; Use 3 times weekly for 2 weeks., Disp-120 mL, R-0, Normal  4. Toenail deformity  -     Ambulatory referral to Podiatry  5. Acute pain of right knee  Comments:  Patient declines orthopedics or physical therapy referrals. She is ambulating well with use of cane. Xray order given to R/O bony injury.  Orders:  -     XR KNEE RIGHT (1-2 VIEWS); Future  6. Status post bilateral knee replacements  -     XR KNEE RIGHT (1-2 VIEWS); Future        Recommendations for Preventive Services Due: see orders and patient instructions/AVS.  Recommended screening schedule for the next 5-10 years is provided to the patient in written form: see Patient Instructions/AVS.     Return for Chronic Disease Check in 6 Months.       Subjective   The following acute and/or chronic problems were also addressed today:  Patient reports stepping on long pajamas and falling last night with direct injury to right knee. There is mild medial knee pain only with \"certain twisting movements.\" Patient denies any knee swelling or unsteady gait. They deny any weakness/instability.   Patient reports itchy, dry scalp that has not improved with use of Selsun Blue over-the-counter.  They request further management.  Patient reports thick, difficult to trim toenails for

## 2024-09-30 NOTE — PATIENT INSTRUCTIONS
Learning About Being Active as an Older Adult  Why is being active important as you get older?     Being active is one of the best things you can do for your health. And it's never too late to start. Being active--or getting active, if you aren't already--has definite benefits. It can:  Give you more energy,  Keep your mind sharp.  Improve balance to reduce your risk of falls.  Help you manage chronic illness with fewer medicines.  No matter how old you are, how fit you are, or what health problems you have, there is a form of activity that will work for you. And the more physical activity you can do, the better your overall health will be.  What kinds of activity can help you stay healthy?  Being more active will make your daily activities easier. Physical activity includes planned exercise and things you do in daily life. There are four types of activity:  Aerobic.  Doing aerobic activity makes your heart and lungs strong.  Includes walking, dancing, and gardening.  Aim for at least 2½ hours spread throughout the week.  It improves your energy and can help you sleep better.  Muscle-strengthening.  This type of activity can help maintain muscle and strengthen bones.  Includes climbing stairs, using resistance bands, and lifting or carrying heavy loads.  Aim for at least twice a week.  It can help protect the knees and other joints.  Stretching.  Stretching gives you better range of motion in joints and muscles.  Includes upper arm stretches, calf stretches, and gentle yoga.  Aim for at least twice a week, preferably after your muscles are warmed up from other activities.  It can help you function better in daily life.  Balancing.  This helps you stay coordinated and have good posture.  Includes heel-to-toe walking, des chi, and certain types of yoga.  Aim for at least 3 days a week.  It can reduce your risk of falling.  Even if you have a hard time meeting the recommendations, it's better to be more active

## 2024-12-02 DIAGNOSIS — I10 ESSENTIAL HYPERTENSION: Chronic | ICD-10-CM

## 2024-12-02 RX ORDER — METOPROLOL SUCCINATE 25 MG/1
25 TABLET, EXTENDED RELEASE ORAL DAILY
Qty: 90 TABLET | Refills: 1 | Status: SHIPPED | OUTPATIENT
Start: 2024-12-02

## 2024-12-02 NOTE — TELEPHONE ENCOUNTER
Patient is requesting medication refill. Please approve or deny this request.    Rx requested:  Requested Prescriptions     Pending Prescriptions Disp Refills    metoprolol succinate (TOPROL XL) 25 MG extended release tablet 90 tablet 1     Sig: Take 1 tablet by mouth daily         Last Office Visit:   9/30/2024      Next Visit Date:  Future Appointments   Date Time Provider Department Center   12/11/2024  1:00 PM Dell Almanza DPM [facility]   3/31/2025  1:00 PM Kaitlin Marcos MD [facility] E[facility] DEP

## 2024-12-11 ENCOUNTER — INITIAL CONSULT (OUTPATIENT)
Dept: PODIATRY | Age: 84
End: 2024-12-11
Payer: MEDICARE

## 2024-12-11 VITALS — BODY MASS INDEX: 39.27 KG/M2 | WEIGHT: 200 LBS | HEIGHT: 60 IN | TEMPERATURE: 97.1 F

## 2024-12-11 DIAGNOSIS — L60.2 ONYCHOGRYPHOSIS: ICD-10-CM

## 2024-12-11 DIAGNOSIS — B35.1 DERMATOPHYTOSIS OF NAIL: ICD-10-CM

## 2024-12-11 DIAGNOSIS — M20.21 HALLUX RIGIDUS, RIGHT FOOT: ICD-10-CM

## 2024-12-11 DIAGNOSIS — M20.42 HAMMERTOE, BILATERAL: ICD-10-CM

## 2024-12-11 DIAGNOSIS — M20.41 HAMMERTOE, BILATERAL: ICD-10-CM

## 2024-12-11 DIAGNOSIS — M79.675 PAIN IN TOES OF BOTH FEET: Primary | ICD-10-CM

## 2024-12-11 DIAGNOSIS — M79.674 PAIN IN TOES OF BOTH FEET: Primary | ICD-10-CM

## 2024-12-11 PROCEDURE — 1125F AMNT PAIN NOTED PAIN PRSNT: CPT | Performed by: PODIATRIST

## 2024-12-11 PROCEDURE — 99203 OFFICE O/P NEW LOW 30 MIN: CPT | Performed by: PODIATRIST

## 2024-12-11 PROCEDURE — 1123F ACP DISCUSS/DSCN MKR DOCD: CPT | Performed by: PODIATRIST

## 2024-12-11 PROCEDURE — 1160F RVW MEDS BY RX/DR IN RCRD: CPT | Performed by: PODIATRIST

## 2024-12-11 PROCEDURE — 1159F MED LIST DOCD IN RCRD: CPT | Performed by: PODIATRIST

## 2024-12-11 PROCEDURE — 11721 DEBRIDE NAIL 6 OR MORE: CPT | Performed by: PODIATRIST

## 2024-12-11 ASSESSMENT — ENCOUNTER SYMPTOMS
VOMITING: 0
BACK PAIN: 0
SHORTNESS OF BREATH: 0
NAUSEA: 0

## 2024-12-11 NOTE — PROGRESS NOTES
at bedtime Apply topically 2 times daily. 60 g 3    nystatin (MYCOSTATIN) 358022 UNIT/GM powder Apply topically 3 times daily Apply 3 times daily. 60 g 3    ezetimibe (ZETIA) 10 MG tablet Take 1 tablet by mouth daily 90 tablet 1    levothyroxine (SYNTHROID) 50 MCG tablet Take 1 tablet by mouth daily 90 tablet 1    ipratropium (ATROVENT) 0.06 % nasal spray 2 sprays by Nasal route 3 times daily 45 mL 1    ibuprofen (ADVIL) 200 MG tablet Take 2 tablets by mouth 2 times daily as needed for Pain 100 tablet 0    Handicap Placard MISC by Does not apply route DX: GENERALIZED OSTEOARTHRITIS (M15.9), HISTORY OF LEFT KNEE REPLACEMENT (Z96.652)     EXPIRES: 10/2025 1 each 0    acetaminophen (TYLENOL) 500 MG tablet Take 1 tablet by mouth every 6 hours as needed for Pain      Multiple Vitamin (MULTI-VITAMIN PO) Take 1 tablet by mouth daily.       No current facility-administered medications on file prior to visit.       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    Fibrocystic disease of right breast     Generalized osteoarthritis 08/04/2014    GERD (gastroesophageal reflux disease)     History of breast cancer in female 08/04/2014    History of colon polyps 05/23/2022    HTN (hypertension) 08/04/2014    Hyperlipidemia     Hypertension     Hypothyroidism 12/02/2016    Insomnia 06/16/2015    Macula lutea degeneration     Moderate persistent asthma 11/25/2020    Moderate persistent asthma 11/25/2020    MRSA (methicillin resistant Staphylococcus aureus) infection     MRSA infection 11/2016    LLQ abdominal wall    Obesity     Postmenopausal 02/09/2017    Pre-diabetes 07/23/2019    Scoliosis

## 2025-01-07 DIAGNOSIS — E03.9 HYPOTHYROIDISM, UNSPECIFIED TYPE: Chronic | ICD-10-CM

## 2025-01-07 RX ORDER — LEVOTHYROXINE SODIUM 50 UG/1
50 TABLET ORAL DAILY
Qty: 90 TABLET | Refills: 1 | Status: SHIPPED | OUTPATIENT
Start: 2025-01-07

## 2025-01-07 NOTE — TELEPHONE ENCOUNTER
Comments:     Last Office Visit (last PCP visit):   9/30/2024    Next Visit Date:  Future Appointments   Date Time Provider Department Center   3/31/2025  1:00 PM Kaitlin Marcos MD [facility] DEP   4/11/2025  2:30 PM Dell Almanza DPM Podiatry [facility]       **If hasn't been seen in over a year OR hasn't followed up according to last diabetes/ADHD visit, make appointment for patient before sending refill to provider.    Rx requested:  Requested Prescriptions     Pending Prescriptions Disp Refills    levothyroxine (SYNTHROID) 50 MCG tablet 90 tablet 1     Sig: Take 1 tablet by mouth daily

## 2025-01-21 DIAGNOSIS — E03.9 HYPOTHYROIDISM, UNSPECIFIED TYPE: Chronic | ICD-10-CM

## 2025-01-21 RX ORDER — LEVOTHYROXINE SODIUM 50 UG/1
50 TABLET ORAL DAILY
Qty: 90 TABLET | Refills: 1 | OUTPATIENT
Start: 2025-01-21

## 2025-01-21 NOTE — TELEPHONE ENCOUNTER
Lisa from [employer] ref# [id number] requesting a refill.     Pt has a new to provider appt  3/31/25     Please advise

## 2025-01-30 DIAGNOSIS — I10 PRIMARY HYPERTENSION: Chronic | ICD-10-CM

## 2025-01-30 RX ORDER — TRIAMTERENE AND HYDROCHLOROTHIAZIDE 37.5; 25 MG/1; MG/1
1 CAPSULE ORAL DAILY
Qty: 90 CAPSULE | Refills: 1 | Status: SHIPPED | OUTPATIENT
Start: 2025-01-30

## 2025-01-30 NOTE — TELEPHONE ENCOUNTER
Patient is requesting medication refill. Please approve or deny this request.    Rx requested:  Requested Prescriptions     Pending Prescriptions Disp Refills    triamterene-hydroCHLOROthiazide (DYAZIDE) 37.5-25 MG per capsule 90 capsule 1     Sig: Take 1 capsule by mouth daily         Last Office Visit:   Visit date not found      Next Visit Date:  Future Appointments   Date Time Provider Department Center   3/31/2025  1:00 PM Kaitlin Marcos MD [facility]   4/11/2025  2:30 PM Dell Almanza, DPM [facility]iat[facility]

## 2025-03-17 DIAGNOSIS — E78.2 MIXED HYPERLIPIDEMIA: Chronic | ICD-10-CM

## 2025-03-17 RX ORDER — EZETIMIBE 10 MG/1
10 TABLET ORAL DAILY
Qty: 90 TABLET | Refills: 1 | Status: SHIPPED | OUTPATIENT
Start: 2025-03-17

## 2025-03-17 NOTE — TELEPHONE ENCOUNTER
Comments:     Last Office Visit (last PCP visit):   Visit date not found    Next Visit Date:  Future Appointments   Date Time Provider Department Center   4/2/2025  1:30 PM Kaitlin Marcos MD [facility]   4/11/2025  2:30 PM Dell Almanza DPM [facility]       **If hasn't been seen in over a year OR hasn't followed up according to last diabetes/ADHD visit, make appointment for patient before sending refill to provider.    Rx requested:  Requested Prescriptions     Pending Prescriptions Disp Refills    ezetimibe (ZETIA) 10 MG tablet 90 tablet 1     Sig: Take 1 tablet by mouth daily

## 2025-03-17 NOTE — TELEPHONE ENCOUNTER
Pt requesting refill . Pt stated she is out of this medication     ezetimibe (ZETIA) 10 MG tablet     FOV 4/2/25

## 2025-04-02 ENCOUNTER — OFFICE VISIT (OUTPATIENT)
Dept: FAMILY MEDICINE CLINIC | Age: 85
End: 2025-04-02

## 2025-04-02 VITALS
SYSTOLIC BLOOD PRESSURE: 116 MMHG | HEIGHT: 60 IN | TEMPERATURE: 97.6 F | OXYGEN SATURATION: 97 % | WEIGHT: 203.4 LBS | DIASTOLIC BLOOD PRESSURE: 68 MMHG | BODY MASS INDEX: 39.93 KG/M2 | HEART RATE: 72 BPM

## 2025-04-02 DIAGNOSIS — H90.0 CONDUCTIVE HEARING LOSS, BILATERAL: ICD-10-CM

## 2025-04-02 DIAGNOSIS — T17.320D CHOKING DUE TO FOOD IN LARYNX, SUBSEQUENT ENCOUNTER: ICD-10-CM

## 2025-04-02 DIAGNOSIS — E66.812 CLASS 2 OBESITY DUE TO EXCESS CALORIES WITH BODY MASS INDEX (BMI) OF 39.0 TO 39.9 IN ADULT, UNSPECIFIED WHETHER SERIOUS COMORBIDITY PRESENT: ICD-10-CM

## 2025-04-02 DIAGNOSIS — Z13.6 SCREENING FOR CARDIOVASCULAR CONDITION: ICD-10-CM
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
## 2025-04-02 DIAGNOSIS — E66.09 CLASS 2 OBESITY DUE TO EXCESS CALORIES WITH BODY MASS INDEX (BMI) OF 39.0 TO 39.9 IN ADULT, UNSPECIFIED WHETHER SERIOUS COMORBIDITY PRESENT: ICD-10-CM

## 2025-04-02 DIAGNOSIS — Z13.0 SCREENING FOR DEFICIENCY ANEMIA: ICD-10-CM

## 2025-04-02 DIAGNOSIS — M81.0 AGE-RELATED OSTEOPOROSIS WITHOUT CURRENT PATHOLOGICAL FRACTURE: ICD-10-CM

## 2025-04-02 DIAGNOSIS — Z00.00 MEDICARE ANNUAL WELLNESS VISIT, SUBSEQUENT: Primary | ICD-10-CM

## 2025-04-02 DIAGNOSIS — E78.2 MIXED HYPERLIPIDEMIA: Chronic | ICD-10-CM

## 2025-04-02 DIAGNOSIS — Z71.89 ACP (ADVANCE CARE PLANNING): ICD-10-CM

## 2025-04-02 DIAGNOSIS — E03.8 OTHER SPECIFIED HYPOTHYROIDISM: Chronic | ICD-10-CM

## 2025-04-02 DIAGNOSIS — I10 ESSENTIAL HYPERTENSION: Chronic | ICD-10-CM

## 2025-04-02 DIAGNOSIS — G25.81 RESTLESS LEGS SYNDROME (RLS): ICD-10-CM

## 2025-04-02 DIAGNOSIS — H61.21 IMPACTED CERUMEN OF RIGHT EAR: ICD-10-CM

## 2025-04-02 DIAGNOSIS — R73.03 PRE-DIABETES: ICD-10-CM

## 2025-04-02 PROBLEM — T17.320A CHOKING DUE TO FOOD IN LARYNX: Status: ACTIVE | Noted: 2025-04-02

## 2025-04-02 PROBLEM — Z86.0100 HISTORY OF COLON POLYPS: Chronic | Status: RESOLVED | Noted: 2022-05-23 | Resolved: 2025-04-02

## 2025-04-02 PROBLEM — E83.52 HYPERCALCEMIA: Status: RESOLVED | Noted: 2021-10-26 | Resolved: 2025-04-02
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
## 2025-04-02 PROBLEM — Z88.0 ALLERGY STATUS TO PENICILLIN: Status: RESOLVED | Noted: 2018-10-15 | Resolved: 2025-04-02

## 2025-04-02 PROBLEM — L30.4 INTERTRIGO: Status: RESOLVED | Noted: 2024-07-29 | Resolved: 2025-04-02

## 2025-04-02 PROBLEM — R30.0 DYSURIA: Status: RESOLVED | Noted: 2023-02-27 | Resolved: 2025-04-02

## 2025-04-02 PROBLEM — Z78.0 POSTMENOPAUSAL: Status: RESOLVED | Noted: 2017-02-09 | Resolved: 2025-04-02

## 2025-04-02 RX ORDER — METOPROLOL SUCCINATE 25 MG/1
25 TABLET, EXTENDED RELEASE ORAL DAILY
Qty: 90 TABLET | Refills: 1 | Status: SHIPPED | OUTPATIENT
Start: 2025-04-02

## 2025-04-02 RX ORDER — ROPINIROLE 0.25 MG/1
0.25 TABLET, FILM COATED ORAL 3 TIMES DAILY
Qty: 90 TABLET | Refills: 3 | Status: SHIPPED | OUTPATIENT
Start: 2025-04-02

## 2025-04-02 RX ORDER — CALCIUM CARBONATE 10GR (648 MG) 648 MG/1
1 TABLET ORAL 2 TIMES DAILY
Qty: 60 TABLET | Refills: 0 | Status: SHIPPED | OUTPATIENT
Start: 2025-04-02 | End: 2026-04-02

## 2025-04-02 RX ORDER — CHOLECALCIFEROL (VITAMIN D3) 25 MCG
1000 TABLET ORAL DAILY
Qty: 30 TABLET | Refills: 0 | Status: SHIPPED | OUTPATIENT
Start: 2025-04-02

## 2025-04-02 RX ORDER — ALENDRONATE SODIUM 70 MG/1
70 TABLET ORAL
Qty: 13 TABLET | Refills: 0 | Status: SHIPPED | OUTPATIENT
Start: 2025-04-02

## 2025-04-02 SDOH — ECONOMIC STABILITY: FOOD INSECURITY: WITHIN THE PAST 12 MONTHS, YOU WORRIED THAT YOUR FOOD WOULD RUN OUT BEFORE YOU GOT MONEY TO BUY MORE.: NEVER TRUE

## 2025-04-02 SDOH — ECONOMIC STABILITY: FOOD INSECURITY: WITHIN THE PAST 12 MONTHS, THE FOOD YOU BOUGHT JUST DIDN'T LAST AND YOU DIDN'T HAVE MONEY TO GET MORE.: NEVER TRUE

## 2025-04-02 ASSESSMENT — PATIENT HEALTH QUESTIONNAIRE - PHQ9
SUM OF ALL RESPONSES TO PHQ QUESTIONS 1-9: 0
2. FEELING DOWN, DEPRESSED OR HOPELESS: NOT AT ALL
10. IF YOU CHECKED OFF ANY PROBLEMS, HOW DIFFICULT HAVE THESE PROBLEMS MADE IT FOR YOU TO DO YOUR WORK, TAKE CARE OF THINGS AT HOME, OR GET ALONG WITH OTHER PEOPLE: NOT DIFFICULT AT ALL
6. FEELING BAD ABOUT YOURSELF - OR THAT YOU ARE A FAILURE OR HAVE LET YOURSELF OR YOUR FAMILY DOWN: NOT AT ALL
1. LITTLE INTEREST OR PLEASURE IN DOING THINGS: NOT AT ALL
8. MOVING OR SPEAKING SO SLOWLY THAT OTHER PEOPLE COULD HAVE NOTICED. OR THE OPPOSITE, BEING SO FIGETY OR RESTLESS THAT YOU HAVE BEEN MOVING AROUND A LOT MORE THAN USUAL: NOT AT ALL
7. TROUBLE CONCENTRATING ON THINGS, SUCH AS READING THE NEWSPAPER OR WATCHING TELEVISION: NOT AT ALL
9. THOUGHTS THAT YOU WOULD BE BETTER OFF DEAD, OR OF HURTING YOURSELF: NOT AT ALL
SUM OF ALL RESPONSES TO PHQ QUESTIONS 1-9: 0
SUM OF ALL RESPONSES TO PHQ QUESTIONS 1-9: 0
5. POOR APPETITE OR OVEREATING: NOT AT ALL
SUM OF ALL RESPONSES TO PHQ QUESTIONS 1-9: 0
3. TROUBLE FALLING OR STAYING ASLEEP: NOT AT ALL
4. FEELING TIRED OR HAVING LITTLE ENERGY: NOT AT ALL

## 2025-04-02 ASSESSMENT — ENCOUNTER SYMPTOMS
COLOR CHANGE: 0
TROUBLE SWALLOWING: 0
ABDOMINAL PAIN: 0
SHORTNESS OF BREATH: 0
NAUSEA: 0
DIARRHEA: 0
EYE PAIN: 0
BACK PAIN: 0
CONSTIPATION: 0
VOICE CHANGE: 0
CHEST TIGHTNESS: 0
BLOOD IN STOOL: 0

## 2025-04-02 ASSESSMENT — LIFESTYLE VARIABLES
HOW MANY STANDARD DRINKS CONTAINING ALCOHOL DO YOU HAVE ON A TYPICAL DAY: PATIENT DOES NOT DRINK
HOW OFTEN DO YOU HAVE A DRINK CONTAINING ALCOHOL: NEVER

## 2025-04-02 NOTE — PATIENT INSTRUCTIONS
information.         Learning About Activities of Daily Living  What are activities of daily living?     Activities of daily living (ADLs) are the basic self-care tasks you do every day. These include eating, bathing, dressing, and moving around.  As you age, and if you have health problems, you may find that it's harder to do some of these tasks. If so, your doctor can suggest ideas that may help.  To measure what kind of help you may need, your doctor will ask how well you are able to do ADLs. Let your doctor know if there are any tasks that you are having trouble doing. This is an important first step to getting help. And when you have the help you need, you can stay as independent as possible.  How will a doctor assess your ADLs?  Asking about ADLs is part of a routine health checkup your doctor will likely do as you age. Your health check might be done in a doctor's office, in your home, or at a hospital. The goal is to find out if you are having any problems that could make it hard to care for yourself or that make it unsafe for you to be on your own.  To measure your ADLs, your doctor will ask how hard it is for you to do routine tasks. Your doctor may also want to know if you have changed the way you do a task because of a health problem. Your doctor may watch how you:  Walk back and forth.  Keep your balance while you stand or walk.  Move from sitting to standing or from a bed to a chair.  Button or unbutton a shirt or sweater.  Remove and put on your shoes.  It's common to feel a little worried or anxious if you find you can't do all the things you used to be able to do. Talking with your doctor about ADLs is a way to make sure you're as safe as possible and able to care for yourself as well as you can. You may want to bring a caregiver, friend, or family member to your checkup. They can help you talk to your doctor.  Follow-up care is a key part of your treatment and safety. Be sure to make and go to all

## 2025-04-02 NOTE — PROGRESS NOTES
[facility]  [address]  [address]  [place] [address]  Dept: [phone]  Dept Fax: [phone]  Loc: [phone]     4/2/2025    Visit type: Office visit/AWV    The patient (or guardian, if applicable) and other individuals in attendance with the patient were advised that Artificial Intelligence will be utilized during this visit to record, process the conversation to generate a clinical note, and support improvement of the AI technology. The patient (or guardian, if applicable) and other individuals in attendance at the appointment consented to the use of AI, including the recording.      Reason for Visit: Medicare AWV (/), Establish Care (Patient presents today to establish care. Previous PCP Dr. Oglesby. ), Leg Problem (Increased restless legs/itching on b/l legs), and Choking (Patient reports having an episode of choking on food/vomiting phlegm. Patient states also having the feeling of foods being stuck in throat. )       ASSESSMENT/PLAN     Assessment & Plan  1. Restless leg syndrome.  - The itching may be associated with her restless leg syndrome.  - No rash observed on physical examination.  - Discussed the condition and its impact on her daily activities, particularly at night.  - Prescription for medication to be taken at night has been provided to manage her restless leg syndrome.    2. Choking episodes.  - Reports episodes of choking on food, vomiting, and felt  of food being stuck in the throat.now feels ok happened 5 times in 10 yrs,  - No current symptoms of choking or vomiting.  - Advised to cut food into small pieces and chew thoroughly before swallowing.  - A barium swallow test has been ordered to investigate the cause of her choking episodes. If the barium swallow test is normal and she continues to experience choking episodes, an endoscopy may be considered.    3. Severe osteoporosis.  - Last bone density test conducted in 2022

## 2025-04-24 DIAGNOSIS — M81.0 AGE-RELATED OSTEOPOROSIS WITHOUT CURRENT PATHOLOGICAL FRACTURE: ICD-10-CM

## 2025-04-24 RX ORDER — CALCIUM CARBONATE 10GR (648 MG) 648 MG/1
1 TABLET ORAL 2 TIMES DAILY
Qty: 180 TABLET | Refills: 1 | Status: SHIPPED | OUTPATIENT
Start: 2025-04-24

## 2025-04-24 RX ORDER — CHOLECALCIFEROL (VITAMIN D3) 25 MCG
1 TABLET ORAL DAILY
Qty: 90 TABLET | Refills: 1 | Status: SHIPPED | OUTPATIENT
Start: 2025-04-24

## 2025-04-24 NOTE — TELEPHONE ENCOUNTER
Comments:     Last Office Visit (last PCP visit):   4/2/2025    Next Visit Date:  Future Appointments   Date Time Provider Department Center   5/6/2025  1:30 PM Kaitlin Marcos [facility]   6/24/2025 11:00 AM Dell Almanza DPM [facility]       **If hasn't been seen in over a year OR hasn't followed up according to last diabetes/ADHD visit, make appointment for patient before sending refill to provider.    Rx requested:  Requested Prescriptions     Pending Prescriptions Disp Refills    TRUE VITAMIN D3 25 MCG (1000 UT) TABS tablet [Pharmacy Med Name: TRUE VITAMIN D3 25 MCG TABLET] 90 tablet 1     Sig: TAKE 1 TABLET BY MOUTH EVERY DAY    calcium carbonate 648 MG TABS [Pharmacy Med Name: CALCIUM CARBONATE 648 MG TAB] 180 tablet 1     Sig: TAKE 1 TABLET BY MOUTH TWICE A DAY

## 2025-04-29 ENCOUNTER — HOSPITAL ENCOUNTER (OUTPATIENT)
Dept: LAB | Age: 85
Discharge: HOME | End: 2025-04-29
Payer: MEDICARE

## 2025-04-29 DIAGNOSIS — E78.2 MIXED HYPERLIPIDEMIA: Chronic | ICD-10-CM

## 2025-04-29 DIAGNOSIS — R73.03 PRE-DIABETES: ICD-10-CM

## 2025-04-29 DIAGNOSIS — E03.8 OTHER SPECIFIED HYPOTHYROIDISM: Chronic | ICD-10-CM

## 2025-04-29 DIAGNOSIS — Z13.0 SCREENING FOR DEFICIENCY ANEMIA: ICD-10-CM

## 2025-04-29 LAB
ALBUMIN SERPL-MCNC: 3.8 G/DL (ref 3.5–4.6)
ALP SERPL-CCNC: 93 U/L (ref 40–130)
ALT SERPL-CCNC: 8 U/L (ref 0–33)
ANION GAP SERPL CALCULATED.3IONS-SCNC: 10 MEQ/L (ref 9–15)
AST SERPL-CCNC: 11 U/L (ref 0–35)
BASOPHILS # BLD: 0.1 K/UL (ref 0–0.2)
BASOPHILS NFR BLD: 0.7 %
BILIRUB SERPL-MCNC: 0.5 MG/DL (ref 0.2–0.7)
BUN SERPL-MCNC: 17 MG/DL (ref 8–23)
CALCIUM SERPL-MCNC: 9.7 MG/DL (ref 8.5–9.9)
CHLORIDE SERPL-SCNC: 100 MEQ/L (ref 95–107)
CHOLEST SERPL-MCNC: 168 MG/DL (ref 0–199)
CO2 SERPL-SCNC: 29 MEQ/L (ref 20–31)
CREAT SERPL-MCNC: 0.66 MG/DL (ref 0.5–0.9)
EOSINOPHIL # BLD: 0.2 K/UL (ref 0–0.7)
EOSINOPHIL NFR BLD: 2.6 %
ERYTHROCYTE [DISTWIDTH] IN BLOOD BY AUTOMATED COUNT: 13.6 % (ref 11.5–14.5)
GLOBULIN SER CALC-MCNC: 3.1 G/DL (ref 2.3–3.5)
GLUCOSE FASTING: 89 MG/DL (ref 70–99)
HCT VFR BLD AUTO: 38.2 % (ref 37–47)
HDLC SERPL-MCNC: 39 MG/DL (ref 40–59)
HGB BLD-MCNC: 11.6 G/DL (ref 12–16)
LDL CHOLESTEROL: 99 MG/DL (ref 0–129)
LYMPHOCYTES # BLD: 0.9 K/UL (ref 1–4.8)
LYMPHOCYTES NFR BLD: 11.7 %
MCH RBC QN AUTO: 26.9 PG (ref 27–31.3)
MCHC RBC AUTO-ENTMCNC: 30.4 % (ref 33–37)
MCV RBC AUTO: 88.6 FL (ref 79.4–94.8)
MONOCYTES # BLD: 0.9 K/UL (ref 0.2–0.8)
MONOCYTES NFR BLD: 12.6 %
NEUTROPHILS # BLD: 5.3 K/UL (ref 1.4–6.5)
NEUTS SEG NFR BLD: 72.1 %
PLATELET # BLD AUTO: 311 K/UL (ref 130–400)
POTASSIUM SERPL-SCNC: 3.9 MEQ/L (ref 3.4–4.9)
PROT SERPL-MCNC: 6.9 G/DL (ref 6.3–8)
RBC # BLD AUTO: 4.31 M/UL (ref 4.2–5.4)
SODIUM SERPL-SCNC: 139 MEQ/L (ref 135–144)
TRIGLYCERIDE, FASTING: 152 MG/DL (ref 0–150)
TSH SERPL-MCNC: 7.19 UIU/ML (ref 0.44–3.86)
WBC # BLD AUTO: 7.3 K/UL (ref 4.8–10.8)

## 2025-04-29 PROCEDURE — 36415 COLL VENOUS BLD VENIPUNCTURE: CPT

## 2025-04-29 PROCEDURE — 85025 COMPLETE CBC W/AUTO DIFF WBC: CPT

## 2025-04-29 PROCEDURE — 80053 COMPREHEN METABOLIC PANEL: CPT

## 2025-04-29 PROCEDURE — 83036 HEMOGLOBIN GLYCOSYLATED A1C: CPT

## 2025-04-29 PROCEDURE — 80061 LIPID PANEL: CPT

## 2025-04-29 PROCEDURE — 84443 ASSAY THYROID STIM HORMONE: CPT

## 2025-04-30 ENCOUNTER — RESULTS FOLLOW-UP (OUTPATIENT)
Dept: FAMILY MEDICINE CLINIC | Age: 85
End: 2025-04-30

## 2025-04-30 LAB
ESTIMATED AVERAGE GLUCOSE: 114 MG/DL
HBA1C MFR BLD: 5.6 % (ref 4–6)

## 2025-05-02 PROBLEM — Z13.6 SCREENING FOR CARDIOVASCULAR CONDITION: Status: RESOLVED | Noted: 2025-04-02 | Resolved: 2025-05-02

## 2025-05-02 PROBLEM — Z00.00 MEDICARE ANNUAL WELLNESS VISIT, SUBSEQUENT: Status: RESOLVED | Noted: 2025-04-02 | Resolved: 2025-05-02

## 2025-05-02 PROBLEM — Z13.0 SCREENING FOR DEFICIENCY ANEMIA: Status: RESOLVED | Noted: 2025-04-02 | Resolved: 2025-05-02

## 2025-05-06 ENCOUNTER — OFFICE VISIT (OUTPATIENT)
Dept: FAMILY MEDICINE CLINIC | Age: 85
End: 2025-05-06

## 2025-05-06 VITALS
BODY MASS INDEX: 39.03 KG/M2 | OXYGEN SATURATION: 95 % | HEIGHT: 60 IN | HEART RATE: 55 BPM | WEIGHT: 198.8 LBS | DIASTOLIC BLOOD PRESSURE: 76 MMHG | SYSTOLIC BLOOD PRESSURE: 124 MMHG | TEMPERATURE: 98.6 F

## 2025-05-06 DIAGNOSIS — D50.8 OTHER IRON DEFICIENCY ANEMIA: ICD-10-CM

## 2025-05-06 DIAGNOSIS — I10 ESSENTIAL HYPERTENSION: Chronic | ICD-10-CM

## 2025-05-06 DIAGNOSIS — G25.81 RESTLESS LEGS SYNDROME (RLS): ICD-10-CM

## 2025-05-06 DIAGNOSIS — R04.0 EPISTAXIS: Primary | ICD-10-CM

## 2025-05-06 DIAGNOSIS — R73.03 PRE-DIABETES: ICD-10-CM

## 2025-05-06 PROBLEM — D64.9 ABSOLUTE ANEMIA: Status: ACTIVE | Noted: 2025-05-06

## 2025-05-06 PROBLEM — H61.21 IMPACTED CERUMEN OF RIGHT EAR: Status: RESOLVED | Noted: 2025-04-02 | Resolved: 2025-05-06

## 2025-05-06 PROBLEM — Z71.89 ACP (ADVANCE CARE PLANNING): Status: RESOLVED | Noted: 2025-04-02 | Resolved: 2025-05-06

## 2025-05-06 RX ORDER — ROPINIROLE 0.25 MG/1
0.25 TABLET, FILM COATED ORAL NIGHTLY
Qty: 30 TABLET | Refills: 3 | Status: SHIPPED | OUTPATIENT
Start: 2025-05-06

## 2025-05-06 ASSESSMENT — ENCOUNTER SYMPTOMS
WHEEZING: 0
DIARRHEA: 0
NAUSEA: 0
SINUS PAIN: 0
COUGH: 0
ABDOMINAL PAIN: 0
CHEST TIGHTNESS: 0
CONSTIPATION: 0
SHORTNESS OF BREATH: 0
VOICE CHANGE: 0
BLOOD IN STOOL: 0

## 2025-05-06 NOTE — PROGRESS NOTES
[facility]  [address]  [address]  [place] [address]  Dept: [phone]  Dept Fax: [phone]  Loc: [phone]     5/6/2025    Visit type: Acute care    The patient (or guardian, if applicable) and other individuals in attendance with the patient were advised that Artificial Intelligence will be utilized during this visit to record, process the conversation to generate a clinical note, and support improvement of the AI technology. The patient (or guardian, if applicable) and other individuals in attendance at the appointment consented to the use of AI, including the recording.      Reason for Visit: 1 Month Follow-Up (/) and Epistaxis (Patient reports having 4-5 L sided nose bleeds in the last week. Has not had any in the last 2 days )       ASSESSMENT/PLAN     Assessment & Plan  1. Epistaxis.  - She has experienced nosebleeds at least 5 times in the past week, although there have been no episodes in the last 2 days.  - Referral to an Ear, Nose, and Throat (ENT) specialist will be made for further evaluation and management.    2. Anemia.  - Blood work indicates anemia, likely due to iron deficiency.  - Advised to start taking a multivitamin with iron.  - Repeat blood count will be conducted in 1 month to monitor response to the treatment.    3. Hypothyroidism.  - Thyroid function tests show slight underactivity.  - No changes to the current thyroid medication regimen are recommended at this time.  - Thyroid function will be reassessed in 3 months.    4. Restless legs syndrome.  - Currently taking medication for restless legs syndrome 3 times a day.  - Advised to adjust the dosage to once at night before sleep.  - A new prescription reflecting this change will be sent.    5. Osteoporosis.  - On a weekly medication regimen for osteoporosis.  - Advised to take osteoporosis medication on an empty stomach and wait for 30 minutes before consuming any food,

## 2025-05-19 ENCOUNTER — APPOINTMENT (OUTPATIENT)
Dept: GENERAL RADIOLOGY | Age: 85
End: 2025-05-19
Payer: MEDICARE

## 2025-05-19 ENCOUNTER — HOSPITAL ENCOUNTER (EMERGENCY)
Age: 85
Discharge: HOME | End: 2025-05-19
Attending: INTERNAL MEDICINE
Payer: MEDICARE

## 2025-05-19 VITALS
HEIGHT: 60 IN | RESPIRATION RATE: 18 BRPM | DIASTOLIC BLOOD PRESSURE: 61 MMHG | BODY MASS INDEX: 42.43 KG/M2 | SYSTOLIC BLOOD PRESSURE: 138 MMHG | HEART RATE: 63 BPM | OXYGEN SATURATION: 96 % | WEIGHT: 216.1 LBS | TEMPERATURE: 97.6 F

## 2025-05-19 DIAGNOSIS — S42.292A OTHER CLOSED DISPLACED FRACTURE OF PROXIMAL END OF LEFT HUMERUS, INITIAL ENCOUNTER: Primary | ICD-10-CM

## 2025-05-19 PROCEDURE — 96375 TX/PRO/DX INJ NEW DRUG ADDON: CPT

## 2025-05-19 PROCEDURE — 73060 X-RAY EXAM OF HUMERUS: CPT

## 2025-05-19 PROCEDURE — 6360000002 HC RX W HCPCS: Performed by: INTERNAL MEDICINE

## 2025-05-19 PROCEDURE — 6370000000 HC RX 637 (ALT 250 FOR IP): Performed by: INTERNAL MEDICINE

## 2025-05-19 PROCEDURE — 99284 EMERGENCY DEPT VISIT MOD MDM: CPT

## 2025-05-19 PROCEDURE — 73030 X-RAY EXAM OF SHOULDER: CPT

## 2025-05-19 PROCEDURE — 96374 THER/PROPH/DIAG INJ IV PUSH: CPT

## 2025-05-19 RX ORDER — MORPHINE SULFATE 4 MG/ML
4 INJECTION, SOLUTION INTRAMUSCULAR; INTRAVENOUS ONCE
Status: COMPLETED | OUTPATIENT
Start: 2025-05-19 | End: 2025-05-19

## 2025-05-19 RX ORDER — HYDROCODONE BITARTRATE AND ACETAMINOPHEN 5; 325 MG/1; MG/1
1 TABLET ORAL EVERY 8 HOURS PRN
Qty: 12 TABLET | Refills: 0 | Status: SHIPPED | OUTPATIENT
Start: 2025-05-19 | End: 2025-05-22

## 2025-05-19 RX ORDER — POLYETHYLENE GLYCOL 3350 17 G/17G
17 POWDER, FOR SOLUTION ORAL 2 TIMES DAILY
Qty: 10 PACKET | Refills: 0 | Status: SHIPPED | OUTPATIENT
Start: 2025-05-19 | End: 2025-05-24

## 2025-05-19 RX ORDER — HYDROCODONE BITARTRATE AND ACETAMINOPHEN 5; 325 MG/1; MG/1
1 TABLET ORAL ONCE
Status: COMPLETED | OUTPATIENT
Start: 2025-05-19 | End: 2025-05-19

## 2025-05-19 RX ORDER — ONDANSETRON 2 MG/ML
4 INJECTION INTRAMUSCULAR; INTRAVENOUS ONCE
Status: COMPLETED | OUTPATIENT
Start: 2025-05-19 | End: 2025-05-19

## 2025-05-19 RX ORDER — KETOROLAC TROMETHAMINE 15 MG/ML
15 INJECTION, SOLUTION INTRAMUSCULAR; INTRAVENOUS ONCE
Status: COMPLETED | OUTPATIENT
Start: 2025-05-19 | End: 2025-05-19

## 2025-05-19 RX ADMIN — KETOROLAC TROMETHAMINE 15 MG: 15 INJECTION, SOLUTION INTRAMUSCULAR; INTRAVENOUS at 22:16

## 2025-05-19 RX ADMIN — ONDANSETRON 4 MG: 2 INJECTION, SOLUTION INTRAMUSCULAR; INTRAVENOUS at 19:58

## 2025-05-19 RX ADMIN — MORPHINE SULFATE 4 MG: 4 INJECTION, SOLUTION INTRAMUSCULAR; INTRAVENOUS at 19:58

## 2025-05-19 RX ADMIN — HYDROCODONE BITARTRATE AND ACETAMINOPHEN 1 TABLET: 5; 325 TABLET ORAL at 22:59

## 2025-05-19 ASSESSMENT — ENCOUNTER SYMPTOMS
SHORTNESS OF BREATH: 0
ABDOMINAL PAIN: 0
COUGH: 0
VOMITING: 0
NAUSEA: 0
RHINORRHEA: 0
BACK PAIN: 0
SORE THROAT: 0
EYE PAIN: 0
DIARRHEA: 0

## 2025-05-19 ASSESSMENT — PAIN - FUNCTIONAL ASSESSMENT
PAIN_FUNCTIONAL_ASSESSMENT: 0-10
PAIN_FUNCTIONAL_ASSESSMENT: NONE - DENIES PAIN

## 2025-05-19 ASSESSMENT — PAIN DESCRIPTION - ORIENTATION
ORIENTATION: LEFT

## 2025-05-19 ASSESSMENT — PAIN SCALES - GENERAL
PAINLEVEL_OUTOF10: 9
PAINLEVEL_OUTOF10: 9
PAINLEVEL_OUTOF10: 10
PAINLEVEL_OUTOF10: 10

## 2025-05-19 ASSESSMENT — PAIN DESCRIPTION - LOCATION
LOCATION: ARM;SHOULDER
LOCATION: SHOULDER

## 2025-05-19 NOTE — ED PROVIDER NOTES
[facility] EMERGENCY DEPARTMENT  EMERGENCY DEPARTMENT ENCOUNTER      Pt Name: Ivy Read  MRN: [id number]  Birthdate 1940  Date of evaluation: 5/19/2025  Provider: Sd Minor DO  Note Started: 5/19/25 7:35 PM EDT    CHIEF COMPLAINT       Chief Complaint   Patient presents with    Shoulder Injury     Left arm     Fall         HISTORY OF PRESENT ILLNESS   (Location/Symptom, Timing/Onset, Context/Setting, Quality, Duration, Modifying Factors, Severity)  Note limiting factors.   Ivy Read is a 85 y.o. female who presents to the emergency department for left shoulder pain    Patient presented for evaluation of left shoulder pain.  Patient indicates that she was wearing new shoes and she slipped and fell onto her left shoulder.  Patient denies any head or neck injury.  Patient denies taking blood thinning agents.  Patient states she is having significant pain in her left shoulder.  Patient had minimal relief with fentanyl and route.  Patient denies other injury.  Patient notes she is left-handed.    The history is provided by the patient.       Nursing Notes were reviewed.    REVIEW OF SYSTEMS    (2-9 systems for level 4, 10 or more for level 5)     Review of Systems   Constitutional:  Negative for chills and fever.   HENT:  Negative for rhinorrhea and sore throat.    Eyes:  Negative for pain and visual disturbance.   Respiratory:  Negative for cough and shortness of breath.    Cardiovascular:  Negative for chest pain and palpitations.   Gastrointestinal:  Negative for abdominal pain, diarrhea, nausea and vomiting.   Genitourinary:  Negative for difficulty urinating and dysuria.   Musculoskeletal:  Positive for arthralgias. Negative for back pain and neck pain.   Skin:  Negative for rash.   Neurological:  Negative for weakness, numbness and headaches.       Except as noted above the remainder of the review of systems was reviewed and negative.       PAST MEDICAL HISTORY     Past Medical History:

## 2025-05-19 NOTE — ED TRIAGE NOTES
[facility] (left shoulder)  Pt was given 100 mcg of fentanyl in squad   Pt states her pain is 9/10  Pt is afebrile   Pt is A&Ox4  Pt uses a walker     Yes

## 2025-05-20 ENCOUNTER — TELEPHONE (OUTPATIENT)
Dept: FAMILY MEDICINE CLINIC | Age: 85
End: 2025-05-20

## 2025-05-20 NOTE — TELEPHONE ENCOUNTER
Patient would need ED follow up in order to have orders placed due to insurance documentation purposes.     Please call patient to schedule ED f/u

## 2025-05-20 NOTE — TELEPHONE ENCOUNTER
Pt was in the ED last night for a fall. They discussed rehab versus home health. Pt prefer home health and is requesting a referral regarding home health care. Please advise.    Ph: [phone]

## 2025-05-21 ENCOUNTER — OFFICE VISIT (OUTPATIENT)
Dept: ORTHOPEDIC SURGERY | Facility: CLINIC | Age: 85
End: 2025-05-21
Payer: MEDICARE

## 2025-05-21 ENCOUNTER — DOCUMENTATION (OUTPATIENT)
Dept: HOME HEALTH SERVICES | Facility: HOME HEALTH | Age: 85
End: 2025-05-21

## 2025-05-21 ENCOUNTER — HOME HEALTH ADMISSION (OUTPATIENT)
Dept: HOME HEALTH SERVICES | Facility: HOME HEALTH | Age: 85
End: 2025-05-21
Payer: MEDICARE

## 2025-05-21 ENCOUNTER — HOSPITAL ENCOUNTER (OUTPATIENT)
Dept: RADIOLOGY | Facility: CLINIC | Age: 85
Discharge: HOME | End: 2025-05-21
Payer: MEDICARE

## 2025-05-21 DIAGNOSIS — S42.202A CLOSED FRACTURE OF PROXIMAL END OF LEFT HUMERUS, UNSPECIFIED FRACTURE MORPHOLOGY, INITIAL ENCOUNTER: ICD-10-CM

## 2025-05-21 PROCEDURE — 99204 OFFICE O/P NEW MOD 45 MIN: CPT | Performed by: STUDENT IN AN ORGANIZED HEALTH CARE EDUCATION/TRAINING PROGRAM

## 2025-05-21 PROCEDURE — 73200 CT UPPER EXTREMITY W/O DYE: CPT | Mod: LT

## 2025-05-21 PROCEDURE — L3670 SO ACRO/CLAV CAN WEB PRE OTS: HCPCS | Performed by: STUDENT IN AN ORGANIZED HEALTH CARE EDUCATION/TRAINING PROGRAM

## 2025-05-21 PROCEDURE — 1157F ADVNC CARE PLAN IN RCRD: CPT | Performed by: STUDENT IN AN ORGANIZED HEALTH CARE EDUCATION/TRAINING PROGRAM

## 2025-05-21 NOTE — PROGRESS NOTES
Acute Injury New Patient Visit    Patient ID: Maylin Ayala is a 85 y.o. female.   History of Present Illness  The patient is an 85-year-old female with a left proximal humerus fracture at the humeral neck and greater tuberosity, initially seen at [facility].    Left Proximal Humerus Fracture  - The injury occurred on 05/20/2025 due to a fall onto her left side in the kitchen.  - She avoided head trauma or loss of consciousness.  - Transported to the ED via ambulance, diagnosed with a proximal humerus fracture.  - Currently using a sling and prescribed pain medication.  - Reports swelling and bruising in the affected area.  - No anticoagulant therapy.  - Mild elbow pain during certain movements.  - No wrist pain.  - Retains wrist mobility.  - No new numbness or tingling.  - Requires assistance with personal hygiene tasks due to being left-handed.  - Follow-up with PCP Dr. Barber on 05/27/2025.    Chronic Knee Swelling  - Reports chronic knee swelling.    Supplemental information: History of bilateral knee replacements and hip replacement.       Assessment & Plan  1. Left proximal humerus fracture:  - Order CT scan for detailed fracture characterization and shoulder joint alignment confirmation  - Initiate home health care for daily activity assistance  - Provide improved sling for comfort and support  - Recommend ice application to affected area  - Avoid movement or weight-bearing activities until follow-up with CT scan results  - No fracture charge at this time    Follow-up  - Follow up in a few days with CT scan results       Assessment:   Problem List Items Addressed This Visit    None  Visit Diagnoses         Closed fracture of proximal end of left humerus, unspecified fracture morphology, initial encounter        Relevant Orders    CT shoulder left wo IV contrast    Sling    Referral to Home Health            Diagnostics: Reviewed all relevant imaging including x-ray, MRI, CT, and US.    Results  Imaging   -  X-ray of the left shoulder: Left proximal humerus fracture at both the humeral neck and the greater tuberosity     Procedure:  Procedures  Physical Exam  - Integumentary: Bruising on the left arm    - Musculoskeletal:    - Left Shoulder: Bruising, no dislocation    - Left Elbow: No significant pain, able to move without numbness or tingling    - Left Wrist: Able to move without numbness or tingling       Orders Placed This Encounter    Sling    CT shoulder left wo IV contrast    Referral to Home Health      At the conclusion of the visit there were no further questions by the patient/family regarding their plan of care.  Patient was instructed to call or return with any issues, questions, or concerns regarding their injury and/or treatment plan described above.     05/21/25 at 11:46 AM - Wolf Maxwell,     Office: [phone]    This note was prepared using voice recognition software.  The details of this note are correct and have been reviewed, and corrected to the best of my ability.  Some grammatical errors may persist related to the Dragon software.  This medical note was created with the assistance of artificial intelligence (AI) for documentation purposes. The content has been reviewed and confirmed by the healthcare provider for accuracy and completeness. Patient consented to the use of audio recording and use of AI during their visit.

## 2025-05-21 NOTE — HH CARE COORDINATION
Home Care received a Referral for Nursing and Physical Therapy. We have processed the referral for a Start of Care on 24-48 HOURS .     If you have any questions or concerns, please feel free to contact us at [phone]. Follow the prompts, enter your five digit zip code, and you will be directed to your care team on WEST 1.

## 2025-05-21 NOTE — Clinical Note
May 21, 2025     Patient: Maylin Ayala   YOB: 1940   Date of Visit: 5/21/2025       To Whom it May Concern:    Maylin Ayala was seen in my clinic on 5/21/2025. She {Return to school/sport:52733}.    If you have any questions or concerns, please don't hesitate to call.         Sincerely,          Wolf Maxwell,         CC: No Recipients

## 2025-05-22 ENCOUNTER — HOME CARE VISIT (OUTPATIENT)
Dept: HOME HEALTH SERVICES | Facility: HOME HEALTH | Age: 85
End: 2025-05-22
Payer: MEDICARE

## 2025-05-22 VITALS
SYSTOLIC BLOOD PRESSURE: 130 MMHG | OXYGEN SATURATION: 94 % | TEMPERATURE: 98.2 F | BODY MASS INDEX: 38.87 KG/M2 | RESPIRATION RATE: 20 BRPM | HEART RATE: 74 BPM | HEIGHT: 60 IN | DIASTOLIC BLOOD PRESSURE: 70 MMHG | WEIGHT: 198 LBS

## 2025-05-22 PROCEDURE — G0299 HHS/HOSPICE OF RN EA 15 MIN: HCPCS | Mod: HHH

## 2025-05-22 PROCEDURE — 169592 NO-PAY CLAIM PROCEDURE

## 2025-05-22 ASSESSMENT — PAIN SCALES - PAIN ASSESSMENT IN ADVANCED DEMENTIA (PAINAD)
BODYLANGUAGE: 0 - RELAXED.
BODYLANGUAGE: 0
CONSOLABILITY: 0
NEGVOCALIZATION: 0
BREATHING: 0
FACIALEXPRESSION: 0
NEGVOCALIZATION: 0 - NONE.
FACIALEXPRESSION: 0 - SMILING OR INEXPRESSIVE.
CONSOLABILITY: 0 - NO NEED TO CONSOLE.
TOTALSCORE: 0

## 2025-05-22 ASSESSMENT — ACTIVITIES OF DAILY LIVING (ADL)
PHYSICAL TRANSFERS ASSESSED: 1
CURRENT_FUNCTION: STAND BY ASSIST
AMBULATION ASSISTANCE: ONE PERSON
CURRENT_FUNCTION: ONE PERSON
OASIS_M1830: 03
AMBULATION ASSISTANCE: 1
ENTERING_EXITING_HOME: MAXIMUM ASSIST
AMBULATION ASSISTANCE: STAND BY ASSIST

## 2025-05-22 ASSESSMENT — ENCOUNTER SYMPTOMS
CHANGE IN APPETITE: UNCHANGED
LIMITED RANGE OF MOTION: 1
HIGHEST PAIN SEVERITY IN PAST 24 HOURS: 9/10
MUSCLE WEAKNESS: 1
LOWEST PAIN SEVERITY IN PAST 24 HOURS: 8/10
APPETITE LEVEL: GOOD
PAIN SEVERITY GOAL: 7/10
SUBJECTIVE PAIN PROGRESSION: UNCHANGED
LAST BOWEL MOVEMENT: 67347

## 2025-05-23 ENCOUNTER — HOME CARE VISIT (OUTPATIENT)
Dept: HOME HEALTH SERVICES | Facility: HOME HEALTH | Age: 85
End: 2025-05-23
Payer: MEDICARE

## 2025-05-23 ENCOUNTER — OFFICE VISIT (OUTPATIENT)
Dept: ORTHOPEDIC SURGERY | Facility: CLINIC | Age: 85
End: 2025-05-23
Payer: MEDICARE

## 2025-05-23 DIAGNOSIS — S42.202A CLOSED FRACTURE OF PROXIMAL END OF LEFT HUMERUS, UNSPECIFIED FRACTURE MORPHOLOGY, INITIAL ENCOUNTER: Primary | ICD-10-CM

## 2025-05-23 RX ORDER — TRAMADOL HYDROCHLORIDE 50 MG/1
50 TABLET, FILM COATED ORAL EVERY 8 HOURS PRN
Qty: 20 TABLET | Refills: 0 | Status: CANCELLED | OUTPATIENT
Start: 2025-05-23 | End: 2025-05-30

## 2025-05-23 ASSESSMENT — ENCOUNTER SYMPTOMS
OCCASIONAL FEELINGS OF UNSTEADINESS: 0
DEPRESSION: 0
LOSS OF SENSATION IN FEET: 0

## 2025-05-23 NOTE — PROGRESS NOTES
Follow-Up Patient Visit  Patient ID: Maylin Ayala is a 85 y.o. female.    History of Present Illness  The patient is an 85-year-old female who presents for follow-up of her left shoulder CT.    Left Shoulder Pain  - Reports significant pain managed with hydrocodone, intermittent Tylenol, and high-dose OTC analgesic (name unknown).  - Concerned about constipation from tramadol.    Finger Numbness  - Reports finger numbness and discomfort with ice application.    Supplemental information: Has not initiated home health care. Prescribed 15 Norco tablets yesterday, not yet collected.    Assessment & Plan  1. Left shoulder fracture, proximal humerus fracture:  - CT shows minimally displaced fracture in socket  - Significant pain managed with hydrocodone and Tylenol  - NSAIDs contraindicated due to blood thinners  - Immobilize shoulder in sling for 2 weeks, remove only for showering, avoid shoulder movement  - Home health care scheduled for assessments and non-shoulder activities  - Prescribe tramadol for pain, caution about constipation  - Recommend Tylenol 1000 mg every 8 hours  - Adjust sling for comfort, advise ice application  - Non-weightbearing and shoulder immobilization until follow-up  - Repeat x-rays at next visit    Follow up in 2-3 weeks with repeat x-rays of the left shoulder       No orders of the defined types were placed in this encounter.      1. Closed fracture of proximal end of left humerus, unspecified fracture morphology, initial encounter        Procedures    Physical Exam  - Musculoskeletal:    - Left Shoulder: Minimally displaced fracture, painful with movement. Sling adjusted.    - Left Elbow: Normal, slight puffiness.    Results  CT scan of left shoulder: Minimally displaced fracture in socket.    At the conclusion of the visit there were no further questions by the patient/family regarding their plan of care.  Patient was instructed to call or return with any issues, questions, or  concerns regarding their injury and/or treatment plan described above.      This medical note was created with the assistance of artificial intelligence (AI) for documentation purposes. The content has been reviewed and confirmed by the healthcare provider for accuracy and completeness. Patient consented to the use of audio recording and use of AI during their visit.   05/23/25 at 3:50 PM - Wolf Maxwell, DO  Office:  [phone]

## 2025-05-24 ENCOUNTER — HOME CARE VISIT (OUTPATIENT)
Dept: HOME HEALTH SERVICES | Facility: HOME HEALTH | Age: 85
End: 2025-05-24
Payer: MEDICARE

## 2025-05-24 VITALS
RESPIRATION RATE: 14 BRPM | OXYGEN SATURATION: 92 % | TEMPERATURE: 97.9 F | HEART RATE: 79 BPM | SYSTOLIC BLOOD PRESSURE: 112 MMHG | DIASTOLIC BLOOD PRESSURE: 70 MMHG

## 2025-05-24 PROCEDURE — G0151 HHCP-SERV OF PT,EA 15 MIN: HCPCS | Mod: HHH

## 2025-05-24 SDOH — HEALTH STABILITY: PHYSICAL HEALTH: EXERCISE ACTIVITY: LAQ

## 2025-05-24 SDOH — HEALTH STABILITY: PHYSICAL HEALTH: EXERCISE ACTIVITIES SETS: 1

## 2025-05-24 SDOH — HEALTH STABILITY: PHYSICAL HEALTH: PHYSICAL EXERCISE: SEATED

## 2025-05-24 SDOH — HEALTH STABILITY: PHYSICAL HEALTH: PHYSICAL EXERCISE: 15

## 2025-05-24 SDOH — HEALTH STABILITY: PHYSICAL HEALTH: EXERCISE ACTIVITY: MARCHING

## 2025-05-24 SDOH — HEALTH STABILITY: PHYSICAL HEALTH: EXERCISE ACTIVITY: ANKLE DF/PF

## 2025-05-24 SDOH — HEALTH STABILITY: PHYSICAL HEALTH: EXERCISE TYPE: LE EXERCISES

## 2025-05-24 ASSESSMENT — ENCOUNTER SYMPTOMS
PAIN LOCATION - PAIN QUALITY: ACHING, SHARP
PAIN LOCATION - EXACERBATING FACTORS: MOVEMENT
PAIN LOCATION - PAIN FREQUENCY: CONSTANT
PAIN LOCATION - RELIEVING FACTORS: PAIN MEDS, REST
PAIN LOCATION - PAIN SEVERITY: 3/10
PAIN LOCATION: LEFT SHOULDER
PERSON REPORTING PAIN: PATIENT
MUSCLE WEAKNESS: 1
SUBJECTIVE PAIN PROGRESSION: GRADUALLY WORSENING
PAIN SEVERITY GOAL: 0/10
PAIN: 1
HIGHEST PAIN SEVERITY IN PAST 24 HOURS: 10/10
OCCASIONAL FEELINGS OF UNSTEADINESS: 1
LOWEST PAIN SEVERITY IN PAST 24 HOURS: 3/10

## 2025-05-24 ASSESSMENT — GAIT ASSESSMENTS
TRUNK: 0 - MARKED SWAY OR USES WALKING AID
GAIT SCORE: 7
INITIATION OF GAIT IMMEDIATELY AFTER GO: 1 - NO HESITANCY
TRUNK SCORE: 0
BALANCE AND GAIT SCORE: 14
STEP CONTINUITY: 1 - STEPS APPEAR CONTINUOUS
PATH: 0 - MARKED DEVIATION
STEP SYMMETRY: 1 - RIGHT AND LEFT STEP LENGTH APPEAR EQUAL
PATH SCORE: 0
WALKING STANCE: 0 - HEELS APART

## 2025-05-24 ASSESSMENT — BALANCE ASSESSMENTS
NUDGED: 0 - BEGINS TO FALL
SITTING BALANCE: 1 - STEADY, SAFE
ARISES: 1 - ABLE, USES ARMS TO HELP
NUDGED SCORE: 0
ATTEMPTS TO ARISE: 1 - ABLE, REQUIRES MORE THAN ONE ATTEMPT
STANDING BALANCE: 1 - STEADY BUT WIDE STANCE AND USES CANE OR OTHER SUPPORT
ARISING SCORE: 1
SITTING DOWN: 1 - USES ARMS OR NOT SMOOTH MOTION
BALANCE SCORE: 7
IMMEDIATE STANDING BALANCE FIRST 5 SECONDS: 1 - STEADY BUT USES WALKER OR OTHER SUPPORT
TURNING 360 DEGREES STEPS: 0 - DISCONTINUOUS STEPS
EYES CLOSED AT MAXIMUM POSITION NUDGED: 0 - UNSTEADY

## 2025-05-24 ASSESSMENT — ACTIVITIES OF DAILY LIVING (ADL)
AMBULATION ASSISTANCE ON FLAT SURFACES: 1
AMBULATION_DISTANCE/DURATION_TOLERATED: 40 FT
AMBULATION ASSISTANCE: ONE PERSON
CURRENT_FUNCTION: ONE PERSON

## 2025-05-27 ENCOUNTER — HOME CARE VISIT (OUTPATIENT)
Dept: HOME HEALTH SERVICES | Facility: HOME HEALTH | Age: 85
End: 2025-05-27
Payer: MEDICARE

## 2025-05-27 ENCOUNTER — OFFICE VISIT (OUTPATIENT)
Dept: FAMILY MEDICINE CLINIC | Age: 85
End: 2025-05-27

## 2025-05-27 VITALS
OXYGEN SATURATION: 95 % | DIASTOLIC BLOOD PRESSURE: 64 MMHG | SYSTOLIC BLOOD PRESSURE: 124 MMHG | TEMPERATURE: 97.4 F | HEART RATE: 82 BPM

## 2025-05-27 DIAGNOSIS — K59.03 DRUG-INDUCED CONSTIPATION: ICD-10-CM

## 2025-05-27 DIAGNOSIS — K64.9 ACUTE HEMORRHOID: ICD-10-CM

## 2025-05-27 DIAGNOSIS — Z09 HOSPITAL DISCHARGE FOLLOW-UP: ICD-10-CM

## 2025-05-27 DIAGNOSIS — S42.352G: Primary | ICD-10-CM

## 2025-05-27 DIAGNOSIS — E78.2 MIXED HYPERLIPIDEMIA: Chronic | ICD-10-CM

## 2025-05-27 PROCEDURE — G0155 HHCP-SVS OF CSW,EA 15 MIN: HCPCS | Mod: HHH

## 2025-05-27 RX ORDER — HYDROCORTISONE ACETATE 25 MG/1
25 SUPPOSITORY RECTAL EVERY 12 HOURS
Qty: 30 SUPPOSITORY | Refills: 1 | Status: ON HOLD | OUTPATIENT
Start: 2025-05-27

## 2025-05-27 RX ORDER — HYDROCODONE BITARTRATE AND ACETAMINOPHEN 5; 325 MG/1; MG/1
1 TABLET ORAL EVERY 8 HOURS PRN
Qty: 30 TABLET | Refills: 0 | Status: ON HOLD | OUTPATIENT
Start: 2025-05-27 | End: 2025-05-30

## 2025-05-27 SDOH — SOCIAL STABILITY: SOCIAL NETWORK: HELP FROM FAMILY/FRIENDS: SONS

## 2025-05-27 ASSESSMENT — ACTIVITIES OF DAILY LIVING (ADL)
EATING_REQUIRES_ASSISTANCE: 1
BATHING_REQUIRES_ASSISTANCE: 1
LAUNDRY_REQUIRES_ASSISTANCE: 1
SHOPPING_REQUIRES_ASSISTANCE: 1
GROOMING_REQUIRES_ASSISTANCE: 1
AMBULATION_REQUIRES_ASSISTANCE: 1
DRESSING_REQUIRES_ASSISTANCE: 1

## 2025-05-27 ASSESSMENT — ENCOUNTER SYMPTOMS
BACK PAIN: 0
DIARRHEA: 0
EYE PAIN: 0
COLOR CHANGE: 0
SHORTNESS OF BREATH: 0
NAUSEA: 0
BLOOD IN STOOL: 0
CHEST TIGHTNESS: 0
CONSTIPATION: 1
TROUBLE SWALLOWING: 0
ABDOMINAL PAIN: 0
VOICE CHANGE: 0

## 2025-05-27 NOTE — PROGRESS NOTES
[facility] PR[facility]  [address]  [address]  [place] [address]  Dept: [phone]  Dept Fax: [phone]  Loc: [phone]     5/27/2025    Visit type: Follow up    The patient (or guardian, if applicable) and other individuals in attendance with the patient were advised that Artificial Intelligence will be utilized during this visit to record, process the conversation to generate a clinical note, and support improvement of the AI technology. The patient (or guardian, if applicable) and other individuals in attendance at the appointment consented to the use of AI, including the recording.      Reason for Visit: ED Follow-up (Pt was seen in er on 5/19/2025 for closed displaced fracture of proximal end of left humerus due to a fall)       ASSESSMENT/PLAN     Assessment & Plan  1. Closed commuted fracture of the proximal end of the left humerus.  - The patient has a closed fracture of the proximal end of the left humerus with surgical neck fracture, medial displacement of surgical neck with minimal impaction, avulsion of the greater trochanter, tiny chip fracture of the lateral aspect of the glenoid it appears chronic  - Conservative management with a sling has been recommended.  By Dr. Doe, I discussed with Dr. Doe about the management, they decided to do only conservative management with rehab if not improving probably they will decide  to do reversal, patient already have a referral for social service and home health care but patient is living with her  he also unable to take care of her she is left-handed she is in so much of pain requiring more pain medication since he ran out of them also complaining of constipation due to codeine patient was on codeine given by the ER patient is accompanied by the son who lives far away they wanted to have a second opinion from a orthopedic surgeon at [facility].  The son prefer short-term rehab since nobody closer to

## 2025-05-28 ENCOUNTER — HOME CARE VISIT (OUTPATIENT)
Dept: HOME HEALTH SERVICES | Facility: HOME HEALTH | Age: 85
End: 2025-05-28
Payer: MEDICARE

## 2025-05-28 VITALS
DIASTOLIC BLOOD PRESSURE: 76 MMHG | OXYGEN SATURATION: 96 % | SYSTOLIC BLOOD PRESSURE: 127 MMHG | HEART RATE: 81 BPM | TEMPERATURE: 99.1 F

## 2025-05-28 PROCEDURE — G0156 HHCP-SVS OF AIDE,EA 15 MIN: HCPCS | Mod: HHH

## 2025-05-28 PROCEDURE — G0157 HHC PT ASSISTANT EA 15: HCPCS | Mod: CQ,HHH

## 2025-05-28 ASSESSMENT — ENCOUNTER SYMPTOMS
PERSON REPORTING PAIN: PATIENT
PAIN LOCATION: LEFT SHOULDER
PAIN: 1
HIGHEST PAIN SEVERITY IN PAST 24 HOURS: 10/10
PAIN LOCATION - PAIN SEVERITY: 3/10

## 2025-05-29 ENCOUNTER — HOME CARE VISIT (OUTPATIENT)
Dept: HOME HEALTH SERVICES | Facility: HOME HEALTH | Age: 85
End: 2025-05-29
Payer: MEDICARE

## 2025-05-29 VITALS
TEMPERATURE: 98.2 F | SYSTOLIC BLOOD PRESSURE: 113 MMHG | DIASTOLIC BLOOD PRESSURE: 60 MMHG | HEART RATE: 76 BPM | RESPIRATION RATE: 20 BRPM | OXYGEN SATURATION: 96 %

## 2025-05-29 PROCEDURE — G0299 HHS/HOSPICE OF RN EA 15 MIN: HCPCS | Mod: HHH

## 2025-05-29 SDOH — HEALTH STABILITY: PHYSICAL HEALTH
EXERCISE COMMENTS: VSTRENGTH TRAINING SUPINE AP, QS, GS, HIP FLEXION, HIP AB/ADDUCTION, LIFT-UPS 15X WITH INSTRUCTIONS/DEMONSTRATION FOR ALIGNMENT AND PACING, BREATHING OUT WITH EXERTION.

## 2025-05-29 SDOH — ECONOMIC STABILITY: GENERAL

## 2025-05-29 ASSESSMENT — ENCOUNTER SYMPTOMS
LAST BOWEL MOVEMENT: 67353
APPETITE LEVEL: GOOD
SUBJECTIVE PAIN PROGRESSION: WAXING AND WANING
STOOL FREQUENCY: DAILY
PAIN: 1
HIGHEST PAIN SEVERITY IN PAST 24 HOURS: 10/10
LOWER EXTREMITY EDEMA: 1
LOWEST PAIN SEVERITY IN PAST 24 HOURS: 2/10
CHANGE IN APPETITE: UNCHANGED
PAIN SEVERITY GOAL: 0/10
PAIN LOCATION: LEFT ARM

## 2025-05-29 ASSESSMENT — ACTIVITIES OF DAILY LIVING (ADL)
AMBULATION ASSISTANCE: STAND BY ASSIST
MONEY MANAGEMENT (EXPENSES/BILLS): NEEDS ASSISTANCE
AMBULATION ASSISTANCE: 1
PHYSICAL TRANSFERS ASSESSED: 1
CURRENT_FUNCTION: STAND BY ASSIST
TOILETING_REQUIRES_ASSISTANCE: 1

## 2025-05-30 ENCOUNTER — HOME CARE VISIT (OUTPATIENT)
Dept: HOME HEALTH SERVICES | Facility: HOME HEALTH | Age: 85
End: 2025-05-30
Payer: MEDICARE

## 2025-05-30 ENCOUNTER — HOSPITAL ENCOUNTER (OUTPATIENT)
Age: 85
Setting detail: OBSERVATION
Discharge: NURSING, REHAB OR LONG-TERM CARE | End: 2025-06-04
Attending: EMERGENCY MEDICINE | Admitting: INTERNAL MEDICINE
Payer: MEDICARE

## 2025-05-30 ENCOUNTER — OFFICE VISIT (OUTPATIENT)
Dept: ORTHOPEDIC SURGERY | Age: 85
End: 2025-05-30
Payer: MEDICARE

## 2025-05-30 VITALS
BODY MASS INDEX: 38.87 KG/M2 | TEMPERATURE: 96.9 F | HEART RATE: 94 BPM | OXYGEN SATURATION: 93 % | WEIGHT: 198 LBS | HEIGHT: 60 IN

## 2025-05-30 VITALS — HEART RATE: 89 BPM | TEMPERATURE: 98.5 F | OXYGEN SATURATION: 96 %

## 2025-05-30 DIAGNOSIS — Z51.89 ENCOUNTER FOR REHABILITATION: Primary | ICD-10-CM

## 2025-05-30 DIAGNOSIS — S42.292A OTHER CLOSED DISPLACED FRACTURE OF PROXIMAL END OF LEFT HUMERUS, INITIAL ENCOUNTER: Primary | ICD-10-CM

## 2025-05-30 DIAGNOSIS — S42.202A CLOSED FRACTURE OF PROXIMAL END OF LEFT HUMERUS, UNSPECIFIED FRACTURE MORPHOLOGY, INITIAL ENCOUNTER: ICD-10-CM

## 2025-05-30 PROBLEM — R53.1 WEAKNESS: Status: ACTIVE | Noted: 2025-05-30

## 2025-05-30 LAB
ALBUMIN SERPL-MCNC: 3.6 G/DL (ref 3.5–4.6)
ALP SERPL-CCNC: 126 U/L (ref 40–130)
ALT SERPL-CCNC: 14 U/L (ref 0–33)
ANION GAP SERPL CALCULATED.3IONS-SCNC: 13 MEQ/L (ref 9–15)
AST SERPL-CCNC: 19 U/L (ref 0–35)
BASOPHILS # BLD: 0.1 K/UL (ref 0–0.1)
BASOPHILS NFR BLD: 0.6 % (ref 0.1–1.2)
BILIRUB SERPL-MCNC: 0.3 MG/DL (ref 0.2–0.7)
BUN SERPL-MCNC: 24 MG/DL (ref 8–23)
CALCIUM SERPL-MCNC: 9.7 MG/DL (ref 8.5–9.9)
CHLORIDE SERPL-SCNC: 101 MEQ/L (ref 95–107)
CO2 SERPL-SCNC: 25 MEQ/L (ref 20–31)
CREAT SERPL-MCNC: 0.8 MG/DL (ref 0.5–0.9)
EOSINOPHIL # BLD: 0.2 K/UL (ref 0–0.4)
EOSINOPHIL NFR BLD: 2.6 % (ref 0.7–5.8)
ERYTHROCYTE [DISTWIDTH] IN BLOOD BY AUTOMATED COUNT: 13.7 % (ref 11.7–14.4)
GLOBULIN SER CALC-MCNC: 3.4 G/DL (ref 2.3–3.5)
GLUCOSE SERPL-MCNC: 90 MG/DL (ref 70–99)
HCT VFR BLD AUTO: 38.6 % (ref 37–47)
HGB BLD-MCNC: 11.7 G/DL (ref 11.2–15.7)
IMM GRANULOCYTES # BLD: 0 K/UL
IMM GRANULOCYTES NFR BLD: 0.4 %
INR PPP: 1
LYMPHOCYTES # BLD: 1 K/UL (ref 1.2–3.7)
LYMPHOCYTES NFR BLD: 13 %
MCH RBC QN AUTO: 26.4 PG (ref 25.6–32.2)
MCHC RBC AUTO-ENTMCNC: 30.3 % (ref 32.2–35.5)
MCV RBC AUTO: 86.9 FL (ref 79.4–94.8)
MONOCYTES # BLD: 0.9 K/UL (ref 0.2–0.9)
MONOCYTES NFR BLD: 11 % (ref 4.7–12.5)
NEUTROPHILS # BLD: 5.7 K/UL (ref 1.6–6.1)
NEUTS SEG NFR BLD: 72.4 % (ref 34–71.1)
PLATELET # BLD AUTO: 409 K/UL (ref 182–369)
POTASSIUM SERPL-SCNC: 3.9 MEQ/L (ref 3.4–4.9)
PROT SERPL-MCNC: 7 G/DL (ref 6.3–8)
PROTHROMBIN TIME: 13.4 SEC (ref 12.3–14.9)
RBC # BLD AUTO: 4.44 M/UL (ref 3.93–5.22)
SODIUM SERPL-SCNC: 139 MEQ/L (ref 135–144)
TSH SERPL-MCNC: 8.94 UIU/ML (ref 0.44–3.86)
WBC # BLD AUTO: 7.8 K/UL (ref 4–10)

## 2025-05-30 PROCEDURE — 85025 COMPLETE CBC W/AUTO DIFF WBC: CPT

## 2025-05-30 PROCEDURE — 96375 TX/PRO/DX INJ NEW DRUG ADDON: CPT

## 2025-05-30 PROCEDURE — 6370000000 HC RX 637 (ALT 250 FOR IP): Performed by: INTERNAL MEDICINE

## 2025-05-30 PROCEDURE — 6360000002 HC RX W HCPCS: Performed by: EMERGENCY MEDICINE

## 2025-05-30 PROCEDURE — 96374 THER/PROPH/DIAG INJ IV PUSH: CPT

## 2025-05-30 PROCEDURE — G0378 HOSPITAL OBSERVATION PER HR: HCPCS

## 2025-05-30 PROCEDURE — 1125F AMNT PAIN NOTED PAIN PRSNT: CPT | Performed by: STUDENT IN AN ORGANIZED HEALTH CARE EDUCATION/TRAINING PROGRAM

## 2025-05-30 PROCEDURE — 99285 EMERGENCY DEPT VISIT HI MDM: CPT

## 2025-05-30 PROCEDURE — 80053 COMPREHEN METABOLIC PANEL: CPT

## 2025-05-30 PROCEDURE — 1159F MED LIST DOCD IN RCRD: CPT | Performed by: STUDENT IN AN ORGANIZED HEALTH CARE EDUCATION/TRAINING PROGRAM

## 2025-05-30 PROCEDURE — 85610 PROTHROMBIN TIME: CPT

## 2025-05-30 PROCEDURE — 99204 OFFICE O/P NEW MOD 45 MIN: CPT | Performed by: STUDENT IN AN ORGANIZED HEALTH CARE EDUCATION/TRAINING PROGRAM

## 2025-05-30 PROCEDURE — 1123F ACP DISCUSS/DSCN MKR DOCD: CPT | Performed by: STUDENT IN AN ORGANIZED HEALTH CARE EDUCATION/TRAINING PROGRAM

## 2025-05-30 PROCEDURE — G0157 HHC PT ASSISTANT EA 15: HCPCS | Mod: CQ,HHH

## 2025-05-30 PROCEDURE — 36415 COLL VENOUS BLD VENIPUNCTURE: CPT

## 2025-05-30 PROCEDURE — 84443 ASSAY THYROID STIM HORMONE: CPT

## 2025-05-30 RX ORDER — MULTIVITAMIN WITH IRON
1 TABLET ORAL DAILY
Status: DISCONTINUED | OUTPATIENT
Start: 2025-05-30 | End: 2025-06-04 | Stop reason: HOSPADM

## 2025-05-30 RX ORDER — MORPHINE SULFATE 2 MG/ML
1 INJECTION, SOLUTION INTRAMUSCULAR; INTRAVENOUS
Status: DISCONTINUED | OUTPATIENT
Start: 2025-05-30 | End: 2025-06-04 | Stop reason: HOSPADM

## 2025-05-30 RX ORDER — OXYCODONE HYDROCHLORIDE 5 MG/1
5 TABLET ORAL EVERY 4 HOURS PRN
Refills: 0 | Status: DISCONTINUED | OUTPATIENT
Start: 2025-05-30 | End: 2025-06-04 | Stop reason: HOSPADM

## 2025-05-30 RX ORDER — METOPROLOL SUCCINATE 25 MG/1
25 TABLET, EXTENDED RELEASE ORAL DAILY
Status: DISCONTINUED | OUTPATIENT
Start: 2025-05-30 | End: 2025-06-04 | Stop reason: HOSPADM

## 2025-05-30 RX ORDER — ROPINIROLE 0.25 MG/1
0.25 TABLET, FILM COATED ORAL NIGHTLY
Status: DISCONTINUED | OUTPATIENT
Start: 2025-05-30 | End: 2025-06-04 | Stop reason: HOSPADM

## 2025-05-30 RX ORDER — ACETAMINOPHEN 500 MG
500 TABLET ORAL EVERY 6 HOURS PRN
Status: DISCONTINUED | OUTPATIENT
Start: 2025-05-30 | End: 2025-06-04 | Stop reason: HOSPADM

## 2025-05-30 RX ORDER — OXYCODONE HYDROCHLORIDE 5 MG/1
2.5 TABLET ORAL EVERY 4 HOURS PRN
Refills: 0 | Status: DISCONTINUED | OUTPATIENT
Start: 2025-05-30 | End: 2025-06-04 | Stop reason: HOSPADM

## 2025-05-30 RX ORDER — MORPHINE SULFATE 4 MG/ML
4 INJECTION, SOLUTION INTRAMUSCULAR; INTRAVENOUS ONCE
Refills: 0 | Status: COMPLETED | OUTPATIENT
Start: 2025-05-30 | End: 2025-05-30

## 2025-05-30 RX ORDER — LEVOTHYROXINE SODIUM 25 UG/1
50 TABLET ORAL DAILY
Status: DISCONTINUED | OUTPATIENT
Start: 2025-05-31 | End: 2025-05-31

## 2025-05-30 RX ORDER — ONDANSETRON 2 MG/ML
4 INJECTION INTRAMUSCULAR; INTRAVENOUS ONCE
Status: COMPLETED | OUTPATIENT
Start: 2025-05-30 | End: 2025-05-30

## 2025-05-30 RX ORDER — CALCIUM CARBONATE 500(1250)
500 TABLET ORAL DAILY
Status: DISCONTINUED | OUTPATIENT
Start: 2025-05-30 | End: 2025-06-04 | Stop reason: HOSPADM

## 2025-05-30 RX ORDER — TRIAMTERENE AND HYDROCHLOROTHIAZIDE 37.5; 25 MG/1; MG/1
1 TABLET ORAL DAILY
Status: DISCONTINUED | OUTPATIENT
Start: 2025-05-30 | End: 2025-06-04 | Stop reason: HOSPADM

## 2025-05-30 RX ADMIN — MULTIVITAMIN TABLET 1 TABLET: TABLET at 18:40

## 2025-05-30 RX ADMIN — METOPROLOL SUCCINATE 25 MG: 25 TABLET, EXTENDED RELEASE ORAL at 18:40

## 2025-05-30 RX ADMIN — MORPHINE SULFATE 4 MG: 4 INJECTION INTRAVENOUS at 15:27

## 2025-05-30 RX ADMIN — OXYCODONE 5 MG: 5 TABLET ORAL at 20:55

## 2025-05-30 RX ADMIN — TRIAMTERENE AND HYDROCHLOROTHIAZIDE 1 TABLET: 37.5; 25 TABLET ORAL at 18:40

## 2025-05-30 RX ADMIN — ROPINIROLE HYDROCHLORIDE 0.25 MG: 0.25 TABLET, FILM COATED ORAL at 20:55

## 2025-05-30 RX ADMIN — ONDANSETRON 4 MG: 2 INJECTION, SOLUTION INTRAMUSCULAR; INTRAVENOUS at 15:25

## 2025-05-30 RX ADMIN — CALCIUM 500 MG: 500 TABLET ORAL at 18:40

## 2025-05-30 SDOH — HEALTH STABILITY: PHYSICAL HEALTH
EXERCISE COMMENTS: STRENGTH TRAINING WITH SUPINE AP, QS, GS, HEEL SLIDES, HIP AB/ADDUCTION, SEATED GLUTE PRESSES WITH REINFORCEMENT FOR ALIGNMENT AND PACING, BREATHING OUT WITH EXERTION 15X.

## 2025-05-30 ASSESSMENT — PAIN DESCRIPTION - DESCRIPTORS
DESCRIPTORS: ACHING
DESCRIPTORS: ACHING

## 2025-05-30 ASSESSMENT — PAIN DESCRIPTION - PAIN TYPE: TYPE: ACUTE PAIN

## 2025-05-30 ASSESSMENT — ENCOUNTER SYMPTOMS
PAIN: 1
HIGHEST PAIN SEVERITY IN PAST 24 HOURS: 8/10
PAIN LOCATION: LEFT SHOULDER
PAIN LOCATION - PAIN SEVERITY: 3/10
PERSON REPORTING PAIN: PATIENT

## 2025-05-30 ASSESSMENT — PAIN DESCRIPTION - ORIENTATION: ORIENTATION: LEFT

## 2025-05-30 ASSESSMENT — PAIN SCALES - GENERAL
PAINLEVEL_OUTOF10: 8
PAINLEVEL_OUTOF10: 4

## 2025-05-30 ASSESSMENT — PAIN DESCRIPTION - LOCATION: LOCATION: ARM;SHOULDER

## 2025-05-30 ASSESSMENT — PAIN - FUNCTIONAL ASSESSMENT: PAIN_FUNCTIONAL_ASSESSMENT: 0-10

## 2025-05-30 NOTE — PROGRESS NOTES
Subjective:      HPI:: Ivy Read is a 85 y.o. female who presents today for evaluation of acute left shoulder pain.  This occurred after a fall on the 19th.  She struck her left shoulder.  Denied hitting her head or loss of consciousness.  She complains of pain only to the left shoulder.  She was [place] dom[place] [facility]  She had been seeing Dr. Cordova.  A CT scan has been obtained.  Patient has been undergoing conservative treatment with the use of sling and pain management.  She has been struggling at home.  Her spouse is elderly.  This is her dominant arm.  She is having hard time taking care of herself.  Patient and family are concerned about her well being.  They are desiring possible rehab placement.    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    Fibrocystic disease of right breast     Generalized osteoarthritis 08/04/2014    GERD (gastroesophageal reflux disease)     History of breast cancer in female 08/04/2014    History of colon polyps 05/23/2022    HTN (hypertension) 08/04/2014    Hyperlipidemia     Hypertension     Hypothyroidism 12/02/2016    Insomnia 06/16/2015    Macula lutea degeneration     Moderate persistent asthma 11/25/2020    Moderate persistent asthma 11/25/2020    MRSA (methicillin resistant Staphylococcus aureus) infection     MRSA infection 11/2016    LLQ abdominal wall    Obesity     Postmenopausal 02/09/2017    Pre-diabetes 07/23/2019    Scoliosis     Subclinical hypothyroidism 05/06/2014    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      Past Surgical History:   Procedure Laterality Date    ABSCESS DRAINAGE Left 11/2016    Abscess abdominal wall

## 2025-05-30 NOTE — ED PROVIDER NOTES
[facility] EMERGENCY DEPARTMENT  eMERGENCY dEPARTMENT eNCOUnter      Pt Name: Ivy Read  MRN: [id number]  Birthdate 1940  Date of evaluation: 5/30/2025  Provider: Nikia Kim MD    CHIEF COMPLAINT       Chief Complaint   Patient presents with    Arm Injury     Unable to care for herself at home , needing rehab       ORY OF PRESENT ILLNESS   (Location/Symptom, Timing/Onset,Context/Setting, Quality, Duration, Modifying Factors, Severity)  Note limiting factors.   Ivy Read is a 85 y.o. female who presents to the emergency department patient accompanied by her son comes in a private vehicle to be admitted to rehab services upstairs for complex comminuted left proximal humerus fracture patient initially was seen at [facility] [place] on 19th of this month as she tripped wearing new shoes and landed on the left side of the shoulder no head neck injury not any blood thinners patient seen by [facility] orthopedic surgeon Dr. Doe and undergoing conservative treatment and outpatient rehab patient left-hand-dominant lives with her  who is also has difficulty time coping with himself and unable to care for this patient/his family requesting a second opinion from the orthopedic for which patient was seen today by Dr. Peterson who strongly recommended to get a inpatient rehab patient also seen by primary care physician Dr. Dayne Amin  HPI    NursingNotes were reviewed.    REVIEW OF SYSTEMS    (2-9 systems for level 4, 10 or more for level 5)     Review of Systems   Constitutional: Negative.  Negative for activity change and fever.   HENT:  Negative for congestion, drooling, facial swelling, mouth sores, nosebleeds, sinus pressure, sore throat, trouble swallowing and voice change.    Eyes:  Negative for pain, discharge, redness and visual disturbance.   Respiratory:  Negative for cough, choking, chest tightness, shortness of breath, wheezing and stridor.    Cardiovascular:  Negative for

## 2025-05-31 PROCEDURE — 97116 GAIT TRAINING THERAPY: CPT

## 2025-05-31 PROCEDURE — G0378 HOSPITAL OBSERVATION PER HR: HCPCS

## 2025-05-31 PROCEDURE — 97530 THERAPEUTIC ACTIVITIES: CPT

## 2025-05-31 PROCEDURE — 97162 PT EVAL MOD COMPLEX 30 MIN: CPT

## 2025-05-31 PROCEDURE — 97166 OT EVAL MOD COMPLEX 45 MIN: CPT

## 2025-05-31 PROCEDURE — 36415 COLL VENOUS BLD VENIPUNCTURE: CPT

## 2025-05-31 PROCEDURE — 6370000000 HC RX 637 (ALT 250 FOR IP): Performed by: INTERNAL MEDICINE

## 2025-05-31 PROCEDURE — 97535 SELF CARE MNGMENT TRAINING: CPT

## 2025-05-31 PROCEDURE — 82306 VITAMIN D 25 HYDROXY: CPT

## 2025-05-31 RX ORDER — LEVOTHYROXINE SODIUM 75 UG/1
75 TABLET ORAL DAILY
Status: DISCONTINUED | OUTPATIENT
Start: 2025-06-01 | End: 2025-06-04 | Stop reason: HOSPADM

## 2025-05-31 RX ADMIN — OXYCODONE 5 MG: 5 TABLET ORAL at 10:31

## 2025-05-31 RX ADMIN — OXYCODONE 5 MG: 5 TABLET ORAL at 16:55

## 2025-05-31 RX ADMIN — METOPROLOL SUCCINATE 25 MG: 25 TABLET, EXTENDED RELEASE ORAL at 10:32

## 2025-05-31 RX ADMIN — TRIAMTERENE AND HYDROCHLOROTHIAZIDE 1 TABLET: 37.5; 25 TABLET ORAL at 10:32

## 2025-05-31 RX ADMIN — ROPINIROLE HYDROCHLORIDE 0.25 MG: 0.25 TABLET, FILM COATED ORAL at 20:09

## 2025-05-31 RX ADMIN — CALCIUM 500 MG: 500 TABLET ORAL at 10:32

## 2025-05-31 RX ADMIN — MULTIVITAMIN TABLET 1 TABLET: TABLET at 10:32

## 2025-05-31 RX ADMIN — ACETAMINOPHEN 500 MG: 500 TABLET ORAL at 20:10

## 2025-05-31 ASSESSMENT — PAIN DESCRIPTION - ORIENTATION
ORIENTATION: LEFT
ORIENTATION: LEFT

## 2025-05-31 ASSESSMENT — PAIN DESCRIPTION - DESCRIPTORS
DESCRIPTORS: ACHING

## 2025-05-31 ASSESSMENT — PAIN SCALES - GENERAL
PAINLEVEL_OUTOF10: 10
PAINLEVEL_OUTOF10: 8
PAINLEVEL_OUTOF10: 3
PAINLEVEL_OUTOF10: 6

## 2025-05-31 ASSESSMENT — PAIN DESCRIPTION - LOCATION
LOCATION: ARM;SHOULDER
LOCATION: ARM;SHOULDER
LOCATION: SHOULDER

## 2025-05-31 NOTE — PLAN OF CARE
Problem: Discharge Planning  Goal: Discharge to home or other facility with appropriate resources  5/31/2025 1102 by Keya Garcia RN  Outcome: Progressing  5/30/2025 2143 by Nicolle Linn RN  Outcome: Progressing     Problem: Pain  Goal: Verbalizes/displays adequate comfort level or baseline comfort level  5/31/2025 1102 by Keya Garcia RN  Outcome: Progressing  5/30/2025 2143 by Nicolle Linn RN  Outcome: Progressing     Problem: Skin/Tissue Integrity  Goal: Skin integrity remains intact  Description: 1.  Monitor for areas of redness and/or skin breakdown2.  Assess vascular access sites hourly3.  Every 4-6 hours minimum:  Change oxygen saturation probe site4.  Every 4-6 hours:  If on nasal continuous positive airway pressure, respiratory therapy assess nares and determine need for appliance change or resting period  5/31/2025 1102 by Keya Garcia RN  Outcome: Progressing  5/30/2025 2143 by Nicolle Linn RN  Outcome: Progressing     Problem: Safety - Adult  Goal: Free from fall injury  5/31/2025 1102 by Keya Garcia RN  Outcome: Progressing  5/30/2025 2143 by Nicolle Linn RN  Outcome: Progressing     Problem: ABCDS Injury Assessment  Goal: Absence of physical injury  5/31/2025 1102 by Keya Garcia RN  Outcome: Progressing  5/30/2025 2143 by Nicolle Linn RN  Outcome: Progressing

## 2025-05-31 NOTE — H&P
Hospital Medicine  History and Physical    Patient:  Ivy Read  MRN: [id number]    CHIEF COMPLAINT:    Chief Complaint   Patient presents with    Arm Injury     Unable to care for herself at home , needing rehab       History Obtained From:  Patient, EMR  Primary Care Physician: Kaitlin Marcos MD    HISTORY OF PRESENT ILLNESS:   The patient is a 85 y.o. female with PMH of hypertension, hypothyroidism.  Patient fell down and suffered left proximal humeral fracture.  She was seen by orthopedic team Dr. Peterson on 5/30 who recommended nonoperative approach.  Patient was having a tough time managing at home.  She is left-handed.  She could not take care of herself therefore the decision was made to admit her to the hospital under observation and facilitate for short-term skilled care.  Patient denies any other symptoms.  No headaches, chest pain, abdominal pain, nausea or vomiting.  No fever or chills.    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    Fibrocystic disease of right breast     Generalized osteoarthritis 08/04/2014    GERD (gastroesophageal reflux disease)     History of breast cancer in female 08/04/2014    History of colon polyps 05/23/2022    HTN (hypertension) 08/04/2014    Hyperlipidemia     Hypertension     Hypothyroidism 12/02/2016    Insomnia 06/16/2015    Macula lutea degeneration     Moderate persistent asthma 11/25/2020    Moderate persistent asthma 11/25/2020    MRSA (methicillin resistant Staphylococcus aureus) infection     MRSA infection 11/2016    LLQ abdominal wall    Obesity     Postmenopausal 02/09/2017    Pre-diabetes 07/23/2019    Scoliosis

## 2025-06-01 LAB — VITAMIN D 25-HYDROXY: 31.1 NG/ML (ref 30–100)

## 2025-06-01 PROCEDURE — 97530 THERAPEUTIC ACTIVITIES: CPT

## 2025-06-01 PROCEDURE — 97116 GAIT TRAINING THERAPY: CPT

## 2025-06-01 PROCEDURE — 6360000002 HC RX W HCPCS: Performed by: INTERNAL MEDICINE

## 2025-06-01 PROCEDURE — G0378 HOSPITAL OBSERVATION PER HR: HCPCS

## 2025-06-01 PROCEDURE — 6370000000 HC RX 637 (ALT 250 FOR IP): Performed by: INTERNAL MEDICINE

## 2025-06-01 PROCEDURE — 96372 THER/PROPH/DIAG INJ SC/IM: CPT

## 2025-06-01 RX ORDER — ERGOCALCIFEROL 1.25 MG/1
50000 CAPSULE, LIQUID FILLED ORAL ONCE
Status: COMPLETED | OUTPATIENT
Start: 2025-06-01 | End: 2025-06-01

## 2025-06-01 RX ORDER — MINERAL OIL AND WHITE PETROLATUM 150; 830 MG/G; MG/G
OINTMENT OPHTHALMIC 2 TIMES DAILY
Status: DISCONTINUED | OUTPATIENT
Start: 2025-06-01 | End: 2025-06-04 | Stop reason: HOSPADM

## 2025-06-01 RX ORDER — ENOXAPARIN SODIUM 100 MG/ML
40 INJECTION SUBCUTANEOUS DAILY
Status: DISCONTINUED | OUTPATIENT
Start: 2025-06-01 | End: 2025-06-04 | Stop reason: HOSPADM

## 2025-06-01 RX ORDER — VITAMIN B COMPLEX
1000 TABLET ORAL DAILY
Status: DISCONTINUED | OUTPATIENT
Start: 2025-06-03 | End: 2025-06-04 | Stop reason: HOSPADM

## 2025-06-01 RX ORDER — OXYMETAZOLINE HYDROCHLORIDE 0.05 G/100ML
1 SPRAY NASAL 2 TIMES DAILY
Status: COMPLETED | OUTPATIENT
Start: 2025-06-01 | End: 2025-06-03

## 2025-06-01 RX ADMIN — MULTIVITAMIN TABLET 1 TABLET: TABLET at 07:21

## 2025-06-01 RX ADMIN — ENOXAPARIN SODIUM 40 MG: 100 INJECTION SUBCUTANEOUS at 10:12

## 2025-06-01 RX ADMIN — MINERAL OIL, PETROLATUM: 425; 573 OINTMENT OPHTHALMIC at 10:12

## 2025-06-01 RX ADMIN — Medication 1 SPRAY: at 20:33

## 2025-06-01 RX ADMIN — ERGOCALCIFEROL 50000 UNITS: 1.25 CAPSULE ORAL at 10:12

## 2025-06-01 RX ADMIN — METOPROLOL SUCCINATE 25 MG: 25 TABLET, EXTENDED RELEASE ORAL at 07:21

## 2025-06-01 RX ADMIN — ROPINIROLE HYDROCHLORIDE 0.25 MG: 0.25 TABLET, FILM COATED ORAL at 20:33

## 2025-06-01 RX ADMIN — LEVOTHYROXINE SODIUM 75 MCG: 0.07 TABLET ORAL at 07:21

## 2025-06-01 RX ADMIN — OXYCODONE 5 MG: 5 TABLET ORAL at 00:41

## 2025-06-01 RX ADMIN — Medication 1 SPRAY: at 10:23

## 2025-06-01 RX ADMIN — OXYCODONE 5 MG: 5 TABLET ORAL at 07:21

## 2025-06-01 RX ADMIN — MINERAL OIL, PETROLATUM: 425; 573 OINTMENT OPHTHALMIC at 20:33

## 2025-06-01 RX ADMIN — CALCIUM 500 MG: 500 TABLET ORAL at 07:21

## 2025-06-01 RX ADMIN — OXYCODONE 5 MG: 5 TABLET ORAL at 13:06

## 2025-06-01 RX ADMIN — OXYCODONE 5 MG: 5 TABLET ORAL at 23:51

## 2025-06-01 RX ADMIN — OXYCODONE 5 MG: 5 TABLET ORAL at 18:38

## 2025-06-01 RX ADMIN — TRIAMTERENE AND HYDROCHLOROTHIAZIDE 1 TABLET: 37.5; 25 TABLET ORAL at 07:21

## 2025-06-01 ASSESSMENT — PAIN DESCRIPTION - DESCRIPTORS
DESCRIPTORS: ACHING
DESCRIPTORS: ACHING

## 2025-06-01 ASSESSMENT — PAIN DESCRIPTION - ORIENTATION
ORIENTATION: LEFT

## 2025-06-01 ASSESSMENT — PAIN SCALES - GENERAL
PAINLEVEL_OUTOF10: 8
PAINLEVEL_OUTOF10: 0
PAINLEVEL_OUTOF10: 9
PAINLEVEL_OUTOF10: 8
PAINLEVEL_OUTOF10: 3
PAINLEVEL_OUTOF10: 6

## 2025-06-01 ASSESSMENT — PAIN DESCRIPTION - LOCATION
LOCATION: ARM;SHOULDER
LOCATION: SHOULDER
LOCATION: BACK;ARM
LOCATION: SHOULDER
LOCATION: SHOULDER

## 2025-06-01 ASSESSMENT — PAIN - FUNCTIONAL ASSESSMENT: PAIN_FUNCTIONAL_ASSESSMENT: PREVENTS OR INTERFERES SOME ACTIVE ACTIVITIES AND ADLS

## 2025-06-01 ASSESSMENT — PAIN SCALES - WONG BAKER: WONGBAKER_NUMERICALRESPONSE: NO HURT

## 2025-06-01 NOTE — PLAN OF CARE
Problem: Discharge Planning  Goal: Discharge to home or other facility with appropriate resources  6/1/2025 0915 by Giovany Woods RN  Outcome: Progressing  5/31/2025 2112 by Nicolle Linn RN  Outcome: Progressing     Problem: Pain  Goal: Verbalizes/displays adequate comfort level or baseline comfort level  6/1/2025 0915 by Giovany Woods RN  Outcome: Progressing  5/31/2025 2112 by Nicolle Linn RN  Outcome: Progressing     Problem: Skin/Tissue Integrity  Goal: Skin integrity remains intact  Description: 1.  Monitor for areas of redness and/or skin breakdown2.  Assess vascular access sites hourly3.  Every 4-6 hours minimum:  Change oxygen saturation probe site4.  Every 4-6 hours:  If on nasal continuous positive airway pressure, respiratory therapy assess nares and determine need for appliance change or resting period  6/1/2025 0915 by Giovany Woods RN  Outcome: Progressing  5/31/2025 2112 by Nicolle Linn RN  Outcome: Progressing     Problem: Safety - Adult  Goal: Free from fall injury  6/1/2025 0915 by Giovany Woods RN  Outcome: Progressing  5/31/2025 2112 by Nicolle Linn RN  Outcome: Progressing     Problem: ABCDS Injury Assessment  Goal: Absence of physical injury  6/1/2025 0915 by Giovany Woods RN  Outcome: Progressing  5/31/2025 2112 by Nicolle Linn RN  Outcome: Progressing

## 2025-06-02 ENCOUNTER — HOME CARE VISIT (OUTPATIENT)
Dept: HOME HEALTH SERVICES | Facility: HOME HEALTH | Age: 85
End: 2025-06-02
Payer: MEDICARE

## 2025-06-02 PROCEDURE — 97116 GAIT TRAINING THERAPY: CPT

## 2025-06-02 PROCEDURE — G0378 HOSPITAL OBSERVATION PER HR: HCPCS

## 2025-06-02 PROCEDURE — 6360000002 HC RX W HCPCS: Performed by: INTERNAL MEDICINE

## 2025-06-02 PROCEDURE — 97110 THERAPEUTIC EXERCISES: CPT

## 2025-06-02 PROCEDURE — 97530 THERAPEUTIC ACTIVITIES: CPT

## 2025-06-02 PROCEDURE — 6370000000 HC RX 637 (ALT 250 FOR IP): Performed by: INTERNAL MEDICINE

## 2025-06-02 PROCEDURE — 96372 THER/PROPH/DIAG INJ SC/IM: CPT

## 2025-06-02 PROCEDURE — 97112 NEUROMUSCULAR REEDUCATION: CPT

## 2025-06-02 PROCEDURE — 97535 SELF CARE MNGMENT TRAINING: CPT

## 2025-06-02 RX ORDER — POLYETHYLENE GLYCOL 3350 17 G/17G
17 POWDER, FOR SOLUTION ORAL DAILY
Status: DISCONTINUED | OUTPATIENT
Start: 2025-06-02 | End: 2025-06-04 | Stop reason: HOSPADM

## 2025-06-02 RX ADMIN — Medication 1 SPRAY: at 08:11

## 2025-06-02 RX ADMIN — MULTIVITAMIN TABLET 1 TABLET: TABLET at 08:11

## 2025-06-02 RX ADMIN — TRIAMTERENE AND HYDROCHLOROTHIAZIDE 1 TABLET: 37.5; 25 TABLET ORAL at 08:11

## 2025-06-02 RX ADMIN — ROPINIROLE HYDROCHLORIDE 0.25 MG: 0.25 TABLET, FILM COATED ORAL at 20:10

## 2025-06-02 RX ADMIN — ENOXAPARIN SODIUM 40 MG: 100 INJECTION SUBCUTANEOUS at 08:11

## 2025-06-02 RX ADMIN — OXYCODONE 5 MG: 5 TABLET ORAL at 08:11

## 2025-06-02 RX ADMIN — LEVOTHYROXINE SODIUM 75 MCG: 0.07 TABLET ORAL at 08:11

## 2025-06-02 RX ADMIN — MINERAL OIL, PETROLATUM: 425; 573 OINTMENT OPHTHALMIC at 08:11

## 2025-06-02 RX ADMIN — OXYCODONE 5 MG: 5 TABLET ORAL at 03:47

## 2025-06-02 RX ADMIN — OXYCODONE 5 MG: 5 TABLET ORAL at 13:10

## 2025-06-02 RX ADMIN — OXYCODONE 5 MG: 5 TABLET ORAL at 23:16

## 2025-06-02 RX ADMIN — Medication 1 SPRAY: at 20:10

## 2025-06-02 RX ADMIN — METOPROLOL SUCCINATE 25 MG: 25 TABLET, EXTENDED RELEASE ORAL at 08:11

## 2025-06-02 RX ADMIN — CALCIUM 500 MG: 500 TABLET ORAL at 08:11

## 2025-06-02 ASSESSMENT — PAIN SCALES - GENERAL
PAINLEVEL_OUTOF10: 3
PAINLEVEL_OUTOF10: 6
PAINLEVEL_OUTOF10: 10
PAINLEVEL_OUTOF10: 8
PAINLEVEL_OUTOF10: 4
PAINLEVEL_OUTOF10: 6
PAINLEVEL_OUTOF10: 3
PAINLEVEL_OUTOF10: 7
PAINLEVEL_OUTOF10: 3
PAINLEVEL_OUTOF10: 0
PAINLEVEL_OUTOF10: 5

## 2025-06-02 ASSESSMENT — PAIN DESCRIPTION - ORIENTATION
ORIENTATION: LEFT

## 2025-06-02 ASSESSMENT — PAIN DESCRIPTION - PAIN TYPE: TYPE: ACUTE PAIN

## 2025-06-02 ASSESSMENT — PAIN SCALES - WONG BAKER
WONGBAKER_NUMERICALRESPONSE: NO HURT
WONGBAKER_NUMERICALRESPONSE: NO HURT

## 2025-06-02 ASSESSMENT — PAIN DESCRIPTION - DESCRIPTORS
DESCRIPTORS: ACHING

## 2025-06-02 ASSESSMENT — PAIN DESCRIPTION - LOCATION
LOCATION: SHOULDER
LOCATION: BACK;ARM
LOCATION: SHOULDER

## 2025-06-02 ASSESSMENT — PAIN - FUNCTIONAL ASSESSMENT
PAIN_FUNCTIONAL_ASSESSMENT: PREVENTS OR INTERFERES SOME ACTIVE ACTIVITIES AND ADLS
PAIN_FUNCTIONAL_ASSESSMENT: PREVENTS OR INTERFERES SOME ACTIVE ACTIVITIES AND ADLS

## 2025-06-02 NOTE — CARE COORDINATION
This LSW met with patient and gave her a list of SNF's.  Patients first choice is [facility] [place] and her second Choice was [facility] in [place].  Information was sent to both facilities via [employer], awaiting responses.  Electronically signed by JELLY Maxwell on 6/2/25 at 3:05 PM EDT

## 2025-06-02 NOTE — PLAN OF CARE
Problem: Discharge Planning  Goal: Discharge to home or other facility with appropriate resources  6/1/2025 2144 by Audelia Hope RN  Outcome: Progressing  6/1/2025 0915 by Giovany Woods RN  Outcome: Progressing     Problem: Pain  Goal: Verbalizes/displays adequate comfort level or baseline comfort level  6/1/2025 2144 by Audelia Hope RN  Outcome: Progressing  6/1/2025 0915 by Giovany Woods RN  Outcome: Progressing     Problem: Skin/Tissue Integrity  Goal: Skin integrity remains intact  Description: 1.  Monitor for areas of redness and/or skin breakdown2.  Assess vascular access sites hourly3.  Every 4-6 hours minimum:  Change oxygen saturation probe site4.  Every 4-6 hours:  If on nasal continuous positive airway pressure, respiratory therapy assess nares and determine need for appliance change or resting period  6/1/2025 2144 by Audelia Hope RN  Outcome: Progressing  6/1/2025 0915 by Giovany Woods RN  Outcome: Progressing     Problem: Safety - Adult  Goal: Free from fall injury  6/1/2025 2144 by Audelia Hope RN  Outcome: Progressing  6/1/2025 0915 by Giovany Woods RN  Outcome: Progressing     Problem: ABCDS Injury Assessment  Goal: Absence of physical injury  6/1/2025 2144 by Audelia Hope RN  Outcome: Progressing  6/1/2025 0915 by Giovany Woods RN  Outcome: Progressing

## 2025-06-02 NOTE — PLAN OF CARE
Problem: Discharge Planning  Goal: Discharge to home or other facility with appropriate resources  6/2/2025 0903 by Giovany Woods RN  Outcome: Progressing  6/1/2025 2144 by Audelia Hope RN  Outcome: Progressing     Problem: Pain  Goal: Verbalizes/displays adequate comfort level or baseline comfort level  6/2/2025 0903 by Giovany Woods RN  Outcome: Progressing  6/1/2025 2144 by Audelia Hope RN  Outcome: Progressing     Problem: Skin/Tissue Integrity  Goal: Skin integrity remains intact  Description: 1.  Monitor for areas of redness and/or skin breakdown2.  Assess vascular access sites hourly3.  Every 4-6 hours minimum:  Change oxygen saturation probe site4.  Every 4-6 hours:  If on nasal continuous positive airway pressure, respiratory therapy assess nares and determine need for appliance change or resting period  6/2/2025 0903 by Giovany Woods RN  Outcome: Progressing  6/1/2025 2144 by Audelia Hope RN  Outcome: Progressing     Problem: Safety - Adult  Goal: Free from fall injury  6/2/2025 0903 by Giovany Woods RN  Outcome: Progressing  6/1/2025 2144 by Audelia Hope RN  Outcome: Progressing     Problem: ABCDS Injury Assessment  Goal: Absence of physical injury  6/2/2025 0903 by Giovany Woods RN  Outcome: Progressing  6/1/2025 2144 by Audelia Hope RN  Outcome: Progressing

## 2025-06-03 PROCEDURE — 6360000002 HC RX W HCPCS: Performed by: INTERNAL MEDICINE

## 2025-06-03 PROCEDURE — 97116 GAIT TRAINING THERAPY: CPT

## 2025-06-03 PROCEDURE — G0378 HOSPITAL OBSERVATION PER HR: HCPCS

## 2025-06-03 PROCEDURE — 97110 THERAPEUTIC EXERCISES: CPT

## 2025-06-03 PROCEDURE — 96372 THER/PROPH/DIAG INJ SC/IM: CPT

## 2025-06-03 PROCEDURE — 97535 SELF CARE MNGMENT TRAINING: CPT

## 2025-06-03 PROCEDURE — 6370000000 HC RX 637 (ALT 250 FOR IP): Performed by: INTERNAL MEDICINE

## 2025-06-03 PROCEDURE — 97530 THERAPEUTIC ACTIVITIES: CPT

## 2025-06-03 RX ORDER — OXYCODONE HYDROCHLORIDE 5 MG/1
5 TABLET ORAL EVERY 4 HOURS PRN
Qty: 20 TABLET | Refills: 0 | Status: SHIPPED | OUTPATIENT
Start: 2025-06-03 | End: 2025-06-05 | Stop reason: SDUPTHER

## 2025-06-03 RX ORDER — LEVOTHYROXINE SODIUM 75 UG/1
75 TABLET ORAL DAILY
Qty: 30 TABLET | Refills: 3 | Status: SHIPPED | OUTPATIENT
Start: 2025-06-03

## 2025-06-03 RX ADMIN — TRIAMTERENE AND HYDROCHLOROTHIAZIDE 1 TABLET: 37.5; 25 TABLET ORAL at 09:07

## 2025-06-03 RX ADMIN — MINERAL OIL, PETROLATUM: 425; 573 OINTMENT OPHTHALMIC at 09:06

## 2025-06-03 RX ADMIN — Medication 1 SPRAY: at 20:53

## 2025-06-03 RX ADMIN — OXYCODONE 5 MG: 5 TABLET ORAL at 20:56

## 2025-06-03 RX ADMIN — ENOXAPARIN SODIUM 40 MG: 100 INJECTION SUBCUTANEOUS at 09:06

## 2025-06-03 RX ADMIN — LEVOTHYROXINE SODIUM 75 MCG: 0.07 TABLET ORAL at 09:07

## 2025-06-03 RX ADMIN — OXYCODONE 5 MG: 5 TABLET ORAL at 16:09

## 2025-06-03 RX ADMIN — MULTIVITAMIN TABLET 1 TABLET: TABLET at 09:07

## 2025-06-03 RX ADMIN — OXYCODONE 5 MG: 5 TABLET ORAL at 04:05

## 2025-06-03 RX ADMIN — Medication 1 SPRAY: at 09:06

## 2025-06-03 RX ADMIN — MINERAL OIL, PETROLATUM: 425; 573 OINTMENT OPHTHALMIC at 20:53

## 2025-06-03 RX ADMIN — CALCIUM 500 MG: 500 TABLET ORAL at 09:07

## 2025-06-03 RX ADMIN — ROPINIROLE HYDROCHLORIDE 0.25 MG: 0.25 TABLET, FILM COATED ORAL at 20:52

## 2025-06-03 RX ADMIN — Medication 1000 UNITS: at 09:07

## 2025-06-03 RX ADMIN — METOPROLOL SUCCINATE 25 MG: 25 TABLET, EXTENDED RELEASE ORAL at 09:07

## 2025-06-03 ASSESSMENT — PAIN SCALES - GENERAL
PAINLEVEL_OUTOF10: 10
PAINLEVEL_OUTOF10: 10
PAINLEVEL_OUTOF10: 6
PAINLEVEL_OUTOF10: 4

## 2025-06-03 ASSESSMENT — PAIN DESCRIPTION - ORIENTATION
ORIENTATION: LEFT
ORIENTATION: LEFT

## 2025-06-03 ASSESSMENT — PAIN DESCRIPTION - LOCATION
LOCATION: SHOULDER
LOCATION: SHOULDER

## 2025-06-03 ASSESSMENT — PAIN DESCRIPTION - DESCRIPTORS
DESCRIPTORS: ACHING
DESCRIPTORS: ACHING

## 2025-06-03 NOTE — CARE COORDINATION
[facility] has accepted patient pending pre-cert, there was no response from [facility].  I called and left a message with [facility] to follow up on referral sent via [facility].  This LSW called and spoke with patients son Kp and updated him on plan for patient.  Will wait and see if [facility] responds, and if not, will proceed with [facility] and have them start a precert for patient.  Electronically signed by JELLY Maxwell on 6/3/25 at 9:47 AM EDT

## 2025-06-03 NOTE — CONSULTS
PODIATRIC MEDICINE AND SURGERY       CONSULT HISTORY AND PHYSICAL     ASSESSMENT:  This 85 y.o. female with admission for weakness presents with elongated and painful nails in setting of pre-diabetes       Plan:  Exam and evaluation  Patient with partially lysed left hallux nail, injured undetermined amount of time ago  Nails 1-5 bilateral debrided in length and thickness without incident using nail nippers  Educated patient on routine foot care and importance of checking feet daily.    Discussed with patient can follow up with podiatry outpatient for routine nail care if desired    HPI: This very pleasant 85 y.o. year old female with seen today for routine nail care.  Patient states that the toenails on both feet are long, painful, and difficult to trim.  Pain in hallux nail left and seocnd toe left. Patient states that their pain is present when the nails haven't been trimmed.   No other pedal complaints.       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    Fibrocystic disease of right breast     Generalized osteoarthritis 08/04/2014    GERD (gastroesophageal reflux disease)     History of breast cancer in female 08/04/2014    History of colon polyps 05/23/2022    HTN (hypertension) 08/04/2014    Hyperlipidemia     Hypertension     Hypothyroidism 12/02/2016    Insomnia 06/16/2015    Macula lutea degeneration     Moderate persistent asthma 11/25/2020    Moderate persistent asthma 11/25/2020    MRSA (methicillin resistant Staphylococcus aureus) infection     MRSA infection 11/2016    LLQ abdominal wall    Obesity     Postmenopausal 02/09/2017    Pre-diabetes 07/23/2019    Scoliosis

## 2025-06-03 NOTE — DISCHARGE SUMMARY
wheezes, rales or rhonchi, symmetric air entry  Heart - normal rate, regular rhythm, normal S1, S2, no murmurs, rubs, clicks or gallops  Abdomen - soft, nontender, nondistended, no masses or organomegaly  Obese: Yes; Protuberant: Yes   Neurological - alert, oriented, normal speech, no focal findings or movement disorder noted  Extremities - L arm in sling   Skin - normal coloration and turgor, no rashes, no suspicious skin lesions noted    Procedures:      Diagnostic Test:      Radiology reports as per the Radiologist  Radiology: No results found.     Results for orders placed or performed during the hospital encounter of 05/30/25   CBC with Auto Differential   Result Value Ref Range    WBC 7.8 4.0 - 10.0 K/uL    RBC 4.44 3.93 - 5.22 M/uL    Hemoglobin 11.7 11.2 - 15.7 g/dL    Hematocrit 38.6 37.0 - 47.0 %    MCV 86.9 79.4 - 94.8 fL    MCH 26.4 25.6 - 32.2 pg    MCHC 30.3 (L) 32.2 - 35.5 %    RDW 13.7 11.7 - 14.4 %    Platelets 409 (H) 182 - 369 K/uL    Neutrophils % 72.4 (H) 34.0 - 71.1 %    Immature Granulocytes % 0.4 %    Lymphocytes % 13.0 %    Monocytes % 11.0 4.7 - 12.5 %    Eosinophils % 2.6 0.7 - 5.8 %    Basophils % 0.6 0.1 - 1.2 %    Neutrophils Absolute 5.7 1.6 - 6.1 K/uL    Immature Granulocytes # 0.0 K/uL    Lymphocytes Absolute 1.0 (L) 1.2 - 3.7 K/uL    Monocytes Absolute 0.9 0.2 - 0.9 K/uL    Eosinophils Absolute 0.2 0.0 - 0.4 K/uL    Basophils Absolute 0.1 0.0 - 0.1 K/uL   CMP   Result Value Ref Range    Sodium 139 135 - 144 mEq/L    Potassium 3.9 3.4 - 4.9 mEq/L    Chloride 101 95 - 107 mEq/L    CO2 25 20 - 31 mEq/L    Anion Gap 13 9 - 15 mEq/L    Glucose 90 70 - 99 mg/dL    BUN 24 (H) 8 - 23 mg/dL    Creatinine 0.80 0.50 - 0.90 mg/dL    Est, Glom Filt Rate 72.1 >60    Calcium 9.7 8.5 - 9.9 mg/dL    Total Protein 7.0 6.3 - 8.0 g/dL    Albumin 3.6 3.5 - 4.6 g/dL    Total Bilirubin 0.3 0.2 - 0.7 mg/dL    Alkaline Phosphatase 126 40 - 130 U/L    ALT 14 0 - 33 U/L    AST 19 0 - 35 U/L    Globulin 3.4

## 2025-06-03 NOTE — CARE COORDINATION
This LSW spoke with patient and let her know [facility] has accepted her, and we have not heard back from [facility] ([facility]  referral sent and voice mail left).  She was in agreement with me telling [facility] to start pre-cert.  Message sent via [employer] to [facility] to start pre-cert.  Electronically signed by JELLY Maxwell on 6/3/25 at 1:19 PM EDT    Kateryna from [facility] did call and say they were reviewing patients info and would let us know.  Electronically signed by JELLY Maxwell on 6/3/25 at 1:24 PM EDT

## 2025-06-03 NOTE — DISCHARGE INSTR - COC
Continuity of Care Form    Patient Name: Ivy Read   :  1940  MRN:  [id number]    Admit date:  2025  Discharge date:  25      Code Status Order: Full Code   Advance Directives:     Admitting Physician:  Tato Muniz MD  PCP: Kaitlin Marcos MD    Discharging Nurse: BS RN  Discharging Hospital Unit/Room#: 0220/0220-01  Discharging Unit Phone Number: [phone]    Emergency Contact:   Extended Emergency Contact Information  Primary Emergency Contact: Kp Read  Address: [address] [place] [address] [facility]  Home Phone: [phone]  Mobile Phone: [phone]  Relation: Child  Secondary Emergency Contact: JacekCortez  Address: [address] [place] [address] [place] [address] [facility]  Home Phone: [phone]  Work Phone: [phone]  Mobile Phone: [phone]  Relation: Spouse    Past Surgical History:  Past Surgical History:   Procedure Laterality Date    ABSCESS DRAINAGE Left 2016    Abscess abdominal wall LLQ    BREAST BIOPSY Right 08/10/2017    DR VALLADARES    BREAST SURGERY N/A 2016    ABDOMINAL INCISION AND DRAINAGE performed by Sveta Valladares MD at [facility] [place]    COLONOSCOPY  04/15/2005    COLONOSCOPY  2009    DR HATCH    COLONOSCOPY  2014    diverticulosis, polyps x 3 (DR HATCH)    DILATION AND CURETTAGE OF UTERUS      #1    DILATION AND CURETTAGE OF UTERUS      #2    HERNIA REPAIR      DR TURNER    HIP SURGERY Left 2018    JOINT REPLACEMENT Left     DR SANCHEZ, hip    LAPAROSCOPY Left 12/15/2021    LAPAROSCOPIC LEFT OOPHORECTOMY AND LEFT ADEXNAL REMOVAL. performed by Jarad Fair MD at [facility]    MASTECTOMY, PARTIAL Right 2003    OVARY REMOVAL Left 12/15/2021    LAPAROSCOPIC OOPHORECTOMY AND LEFT ADEXNAL MASS REMOVAL (DR FAIR)    [place] NEUROPLASTY &/TRANSPOS MEDIAN NRV CARPAL TUNNE Left 2018    LEFT WRIST CARPAL TUNNEL RELEASE performed by Keanu Salazar MD at [facility] [place]    [place]

## 2025-06-04 VITALS
RESPIRATION RATE: 20 BRPM | OXYGEN SATURATION: 97 % | SYSTOLIC BLOOD PRESSURE: 141 MMHG | DIASTOLIC BLOOD PRESSURE: 66 MMHG | HEIGHT: 66 IN | WEIGHT: 198 LBS | TEMPERATURE: 98.2 F | BODY MASS INDEX: 31.82 KG/M2 | HEART RATE: 74 BPM

## 2025-06-04 PROCEDURE — G0378 HOSPITAL OBSERVATION PER HR: HCPCS

## 2025-06-04 PROCEDURE — 97530 THERAPEUTIC ACTIVITIES: CPT

## 2025-06-04 PROCEDURE — 97535 SELF CARE MNGMENT TRAINING: CPT

## 2025-06-04 PROCEDURE — 6370000000 HC RX 637 (ALT 250 FOR IP): Performed by: INTERNAL MEDICINE

## 2025-06-04 PROCEDURE — 96372 THER/PROPH/DIAG INJ SC/IM: CPT

## 2025-06-04 PROCEDURE — 97116 GAIT TRAINING THERAPY: CPT

## 2025-06-04 PROCEDURE — 97110 THERAPEUTIC EXERCISES: CPT

## 2025-06-04 PROCEDURE — 6360000002 HC RX W HCPCS: Performed by: INTERNAL MEDICINE

## 2025-06-04 RX ADMIN — TRIAMTERENE AND HYDROCHLOROTHIAZIDE 1 TABLET: 37.5; 25 TABLET ORAL at 08:25

## 2025-06-04 RX ADMIN — MINERAL OIL, PETROLATUM: 425; 573 OINTMENT OPHTHALMIC at 08:25

## 2025-06-04 RX ADMIN — OXYCODONE 5 MG: 5 TABLET ORAL at 05:39

## 2025-06-04 RX ADMIN — POLYETHYLENE GLYCOL 3350 17 G: 17 POWDER, FOR SOLUTION ORAL at 08:24

## 2025-06-04 RX ADMIN — CALCIUM 500 MG: 500 TABLET ORAL at 08:25

## 2025-06-04 RX ADMIN — METOPROLOL SUCCINATE 25 MG: 25 TABLET, EXTENDED RELEASE ORAL at 08:24

## 2025-06-04 RX ADMIN — ENOXAPARIN SODIUM 40 MG: 100 INJECTION SUBCUTANEOUS at 08:25

## 2025-06-04 RX ADMIN — LEVOTHYROXINE SODIUM 75 MCG: 0.07 TABLET ORAL at 08:24

## 2025-06-04 RX ADMIN — Medication 1000 UNITS: at 08:24

## 2025-06-04 RX ADMIN — OXYCODONE 5 MG: 5 TABLET ORAL at 17:26

## 2025-06-04 RX ADMIN — OXYCODONE 5 MG: 5 TABLET ORAL at 09:30

## 2025-06-04 RX ADMIN — MULTIVITAMIN TABLET 1 TABLET: TABLET at 08:24

## 2025-06-04 ASSESSMENT — PAIN DESCRIPTION - ORIENTATION
ORIENTATION: LEFT

## 2025-06-04 ASSESSMENT — PAIN DESCRIPTION - DESCRIPTORS
DESCRIPTORS: ACHING

## 2025-06-04 ASSESSMENT — PAIN DESCRIPTION - LOCATION
LOCATION: SHOULDER

## 2025-06-04 ASSESSMENT — PAIN SCALES - GENERAL
PAINLEVEL_OUTOF10: 10
PAINLEVEL_OUTOF10: 5
PAINLEVEL_OUTOF10: 10
PAINLEVEL_OUTOF10: 8
PAINLEVEL_OUTOF10: 6

## 2025-06-04 NOTE — PLAN OF CARE
Problem: Discharge Planning  Goal: Discharge to home or other facility with appropriate resources  6/3/2025 2127 by Jojo English RN  Outcome: Progressing

## 2025-06-04 NOTE — CARE COORDINATION
Transportation set up for 6:00 pm this evening, son Kp called, message left.  Patient and nursing notified.  [id number] obtained and original placed on patients chart.  Kp returned call and is aware of DC to [facility] at 6:00 pm.    Electronically signed by JELLY Maxwell on 6/4/25 at 3:16 PM EDT

## 2025-06-04 NOTE — PROGRESS NOTES
Hospitalist Progress Note      PCP: Kaitlin Marcos MD    Date of Admission: 5/30/2025    Chief Complaint: weakness/pain     Subjective: patient in chair     Medications:  Reviewed    Infusion Medications   Scheduled Medications    polyethylene glycol  17 g Oral Daily    oxymetazoline  1 spray Each Nostril BID    artificial tears   Both Eyes BID    [START ON 6/3/2025] Vitamin D  1,000 Units Oral Daily    enoxaparin  40 mg SubCUTAneous Daily    levothyroxine  75 mcg Oral Daily    metoprolol succinate  25 mg Oral Daily    multivitamin  1 tablet Oral Daily    rOPINIRole  0.25 mg Oral Nightly    triamterene-hydroCHLOROthiazide  1 tablet Oral Daily    calcium elemental  500 mg Oral Daily     PRN Meds: morphine, oxyCODONE, oxyCODONE, acetaminophen      Intake/Output Summary (Last 24 hours) at 6/2/2025 1000  Last data filed at 6/1/2025 2030  Gross per 24 hour   Intake 510 ml   Output --   Net 510 ml       Exam:    BP (!) 138/57   Pulse 81   Temp 97.9 °F (36.6 °C) (Oral)   Resp 18   Ht 1.666 m (5' 5.59\")   Wt 89.8 kg (198 lb)   LMP  (LMP Unknown)   SpO2 95%   BMI 32.36 kg/m²     General appearance: No apparent distress, appears stated age and cooperative.  HEENT: Pupils equal, round, and reactive to light. Conjunctivae/corneas clear.  Neck: Supple, with full range of motion. No jugular venous distention. Trachea midline.  Respiratory:  Normal respiratory effort. Clear to auscultation, bilaterally without Rales/Wheezes/Rhonchi.  Cardiovascular: Regular rate and rhythm with normal S1/S2 without murmurs, rubs or gallops.  Abdomen: Soft, non-tender, non-distended with normal bowel sounds.  Musculoskeletal: L arm in sling   Skin: Skin color, texture, turgor normal.  No rashes or lesions.  Neurologic:  Neurovascularly intact without any focal sensory/motor deficits. Cranial nerves: II-XII intact, grossly non-focal.  Psychiatric: Alert and oriented, thought content appropriate, normal insight  Capillary 
    Hospitalist Progress Note      PCP: Kaitlin Marcos MD    Date of Admission: 5/30/2025    Chief Complaint: weakness/pain    Subjective: patient in chair    Medications:  Reviewed    Infusion Medications   Scheduled Medications    polyethylene glycol  17 g Oral Daily    artificial tears   Both Eyes BID    Vitamin D  1,000 Units Oral Daily    enoxaparin  40 mg SubCUTAneous Daily    levothyroxine  75 mcg Oral Daily    metoprolol succinate  25 mg Oral Daily    multivitamin  1 tablet Oral Daily    rOPINIRole  0.25 mg Oral Nightly    triamterene-hydroCHLOROthiazide  1 tablet Oral Daily    calcium elemental  500 mg Oral Daily     PRN Meds: morphine, oxyCODONE, oxyCODONE, acetaminophen    No intake or output data in the 24 hours ending 06/04/25 0813    Exam:    BP (!) 141/66   Pulse 74   Temp 98.2 °F (36.8 °C) (Oral)   Resp 20   Ht 1.666 m (5' 5.59\")   Wt 89.8 kg (198 lb)   LMP  (LMP Unknown)   SpO2 97%   BMI 32.36 kg/m²     General appearance: No apparent distress, appears stated age and cooperative.  HEENT: Pupils equal, round, and reactive to light. Conjunctivae/corneas clear.  Neck: Supple, with full range of motion. No jugular venous distention. Trachea midline.  Respiratory:  Normal respiratory effort. Clear to auscultation, bilaterally without Rales/Wheezes/Rhonchi.  Cardiovascular: Regular rate and rhythm with normal S1/S2 without murmurs, rubs or gallops.  Abdomen: Soft, non-tender, non-distended with normal bowel sounds.  Musculoskeletal: L arm in sling   Skin: Skin color, texture, turgor normal.  No rashes or lesions.  Neurologic:  Neurovascularly intact without any focal sensory/motor deficits. Cranial nerves: II-XII intact, grossly non-focal.  Psychiatric: Alert and oriented, thought content appropriate, normal insight  Capillary Refill: Brisk,< 3 seconds   Peripheral Pulses: +2 palpable, equal bilaterally       Labs:   No results for input(s): \"WBC\", \"HGB\", \"HCT\", \"PLT\" in the last 72 
1700 patient to room 220 via wc from ED, transferred to toilet with 1 assist, then ambulated to bed with 1 assist and cane, sling to left arm intact, patient declines to remove personal clothing stating it is too cold, offered warm blankets, she still wishes to stay in her personal clothes, admission assessment in progress, son at bedside. Electronically signed by Sunshine Francois RN on 5/30/2025 at 6:31 PM   
18:10 Pt transferred to [facility] via ambulette with all belongings. Report called to [facility].   
Dr. Jodie serra served for new consult   
Facility/Department: [facility] MED SURG UNIT  Daily Treatment Note  NAME: Ivy Read  : 1940  MRN: [id number]    Date of Service: 2025    Discharge Recommendations:  Continue to assess pending progress, Subacute/Skilled Nursing Facility        Patient Diagnosis(es): The primary encounter diagnosis was Encounter for rehabilitation. A diagnosis of Closed fracture of proximal end of left humerus, unspecified fracture morphology, initial encounter was also pertinent to this visit.         Restrictions  Restrictions/Precautions  Restrictions/Precautions: Fall Risk, ROM Restrictions, Weight Bearing  Activity Level: Up with Assist  Required Braces or Orthoses?: Yes (sling AAT)  Upper Extremity Weight Bearing Restrictions  Left Upper Extremity Weight Bearing: Non Weight Bearing  Other: 25 Ok ROM to elbow, wrist and hand. NO ROM R SHOULDER  Position Activity Restriction  Other Position/Activity Restrictions: No AROM L shoulder     Objective  New orders for LUE: OK ROM elbow, wrist and hand. NO ROM Lshoulder  Goals  Short Term Goals  Time Frame for Short Term Goals: 2-5 days  Short Term Goal 1: Min A with bed mobility  Short Term Goal 2: CGA with transfers  Short Term Goal 3: Pt able to ambulate with AD for 50' x 2 with CGA and safe gait pattern  Short Term Goal 4: Pt able to ambulate up/down 2 steps with Min A for safe entry into her home  Short Term Goal 5: Pt able to tolerate 10 reps of B LE ther ex to build up pts strength  Long Term Goals  Time Frame for Long Term Goals : same as STGs  Patient Goals   Patient Goals : To be able to get better and use her left arm    Order update only.     Therapy Time   Individual Concurrent Group Co-treatment   Time In           Time Out           Minutes                   Anastasia Emery, PT             
Facility/Department: [facility] MED SURG UNIT  Daily Treatment Note  NAME: Ivy Read  : 1940  MRN: [id number]    Date of Service: 2025    Discharge Recommendations:  Continue to assess pending progress, Subacute/Skilled Nursing Facility, 24 hour supervision or assist (slow snf)         Patient Diagnosis(es): The primary encounter diagnosis was Encounter for rehabilitation. A diagnosis of Closed fracture of proximal end of left humerus, unspecified fracture morphology, initial encounter was also pertinent to this visit.     Assessment   Assessment: Pt participated in full shower and dressing tasks on this date. Cues needed for sequencing of tasks for safety and assistance needed d/t only having use of one hand. Simple sling utilized for LUE to keep personal sling dry. Pt also completing toileting tasks, TH while seated, assistance needed for LB clothing mgt. Pt sitting EOB for dressing tasks post shower with MAX/TD. Sling replaced with personal one and adjusted for comfort. Pt verb need to use toilet again at end of session. Educated pt to use call light when finished for assistance for clothing mgt and to get back to chair. Pt verb understanding.  Activity Tolerance: Patient limited by pain;Patient limited by endurance  Discharge Recommendations: Continue to assess pending progress;Subacute/Skilled Nursing Facility;24 hour supervision or assist (slow snf)     Plan  Occupational Therapy Plan  Times Per Week: 4-7  Times Per Day: Once a day  Current Treatment Recommendations: Strengthening;Functional mobility training;Endurance training;Safety education & training;Patient/Caregiver education & training;Co-Treatment;Self-Care / ADL;Positioning;Equipment evaluation, education, & procurement;Pain management    Restrictions   NWB, no ROM LUE, sling on at all times    Subjective  Subjective  Subjective: \"I want to get in the shower.\"  Pain: 9/10 L shoulder  Orientation  Overall Orientation Status: Within 
Facility/Department: [facility] MED SURG UNIT  Daily Treatment Note  NAME: Ivy Read  : 1940  MRN: [id number]    Date of Service: 2025    Discharge Recommendations:  Continue to assess pending progress, Subacute/Skilled Nursing Facility, Home with Home health PT, Home with assist PRN        Patient Diagnosis(es): The primary encounter diagnosis was Encounter for rehabilitation. A diagnosis of Closed fracture of proximal end of left humerus, unspecified fracture morphology, initial encounter was also pertinent to this visit.    Assessment  Assessment: Pt with higher level of pain this afternoon with pt not receiving pain meds earlier. Pt needing Diego for transfer from recliner needing VC's for inc technique for anterior weight shifting. Pt deferred longer gait trial after toileting d/t high level of pain and received pain meds. Pt requesting deferring further tx d/t pain. Pt left to sit up in recliner with call light in reach and chair alarm in place. Continue with POC.  Activity Tolerance: Patient limited by pain;Patient limited by endurance    Plan  Physical Therapy Plan  General Plan: 5-7 times per week  Current Treatment Recommendations: Strengthening;Balance training;Functional mobility training;Transfer training;Endurance training;Pain management;Stair training;Gait training;Home exercise program;Therapeutic activities    Restrictions  Restrictions/Precautions  Restrictions/Precautions: Fall Risk, ROM Restrictions, Weight Bearing  Activity Level: Up with Assist  Required Braces or Orthoses?: Yes (sling AAT)  Upper Extremity Weight Bearing Restrictions  Left Upper Extremity Weight Bearing: Non Weight Bearing  Other: 25 Ok ROM to elbow, wrist and hand. NO ROM L SHOULDER  Position Activity Restriction  Other Position/Activity Restrictions: No AROM L shoulder     Subjective   Subjective  Subjective: Pt sitting in recliner and agreeable to therapy. Pt states she needs to use toilet. \"I don't think I 
Facility/Department: [facility] SURG UNIT  Physical Therapy Initial Assessment    Name: Ivy Read  : 1940  MRN: [id number]  Date of Service: 2025    Discharge Recommendations:  Continue to assess pending progress, Subacute/Skilled Nursing Facility          Patient Diagnosis(es): The primary encounter diagnosis was Encounter for rehabilitation. A diagnosis of Closed fracture of proximal end of left humerus, unspecified fracture morphology, initial encounter was also pertinent to this visit.  Past Medical History:  has a past medical history of Abnormal ultrasound of breast, Abscess of abdominal wall, Bilateral carpal tunnel syndrome, Bleeding hemorrhoid, Breast cancer (HCC), Breast cancer, right (HCC), Carbuncle of abdominal wall, Ductal carcinoma in situ of breast, Fibrocystic disease of right breast, Generalized osteoarthritis, GERD (gastroesophageal reflux disease), History of breast cancer in female, History of colon polyps, HTN (hypertension), Hyperlipidemia, Hypertension, Hypothyroidism, Insomnia, Macula lutea degeneration, Moderate persistent asthma, Moderate persistent asthma, MRSA (methicillin resistant Staphylococcus aureus) infection, MRSA infection, Obesity, Postmenopausal, Pre-diabetes, Scoliosis, Subclinical hypothyroidism, and 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  Past Surgical History:  has a past surgical history that includes Dilation and curettage of uterus; Colonoscopy (04/15/2005); hernia repair; Upper gastrointestinal endoscopy; Colonoscopy (2009); Mastectomy, partial (Right, ); Colonoscopy (2014); Breast surgery (N/A, 2016); Abscess Drainage (Left, 2016); Breast biopsy (Right, 08/10/2017); hip surgery (Left, 2018); joint replacement (Left, ); pr neuroplasty &/transpos median nrv carpal tunne (Left, 2018); Dilation and curettage of uterus; pr neuroplasty &/transpos median nrv carpal tunne (Right, 2018); Total knee arthroplasty (Bilateral); 
Facility/Department: [facility] SURG UNIT  Occupational Therapy Initial Assessment    Name: Ivy Read  : 1940  MRN: [id number]  Date of Service: 2025    Discharge Recommendations:  Continue to assess pending progress, Subacute/Skilled Nursing Facility, 24 hour supervision or assist (slow snf)          Patient Diagnosis(es): The primary encounter diagnosis was Encounter for rehabilitation. A diagnosis of Closed fracture of proximal end of left humerus, unspecified fracture morphology, initial encounter was also pertinent to this visit.  Past Medical History:  has a past medical history of Abnormal ultrasound of breast, Abscess of abdominal wall, Bilateral carpal tunnel syndrome, Bleeding hemorrhoid, Breast cancer (HCC), Breast cancer, right (HCC), Carbuncle of abdominal wall, Ductal carcinoma in situ of breast, Fibrocystic disease of right breast, Generalized osteoarthritis, GERD (gastroesophageal reflux disease), History of breast cancer in female, History of colon polyps, HTN (hypertension), Hyperlipidemia, Hypertension, Hypothyroidism, Insomnia, Macula lutea degeneration, Moderate persistent asthma, Moderate persistent asthma, MRSA (methicillin resistant Staphylococcus aureus) infection, MRSA infection, Obesity, Postmenopausal, Pre-diabetes, Scoliosis, Subclinical hypothyroidism, and 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  Past Surgical History:  has a past surgical history that includes Dilation and curettage of uterus; Colonoscopy (04/15/2005); hernia repair; Upper gastrointestinal endoscopy; Colonoscopy (2009); Mastectomy, partial (Right, ); Colonoscopy (2014); Breast surgery (N/A, 2016); Abscess Drainage (Left, 2016); Breast biopsy (Right, 08/10/2017); hip surgery (Left, 2018); joint replacement (Left, ); pr neuroplasty &/transpos median nrv carpal tunne (Left, 2018); Dilation and curettage of uterus; pr neuroplasty &/transpos median nrv carpal tunne (Right, 
Facility/Department: [facility] MED SURG UNIT  Daily Treatment Note  NAME: Ivy Read  : 1940  MRN: [id number]    Date of Service: 2025    Discharge Recommendations:  Continue to assess pending progress, Subacute/Skilled Nursing Facility, Home with Home health PT, Home with assist PRN        Patient Diagnosis(es): The primary encounter diagnosis was Encounter for rehabilitation. A diagnosis of Closed fracture of proximal end of left humerus, unspecified fracture morphology, initial encounter was also pertinent to this visit.    Assessment  Assessment: Pt requesting assist for pericare this date but was able to perform herself after education and encouragement as pt wishes to return home vs slow paced SNF at this time. Pt limited by pain this date and when attempting to walk farther distance outside room, pt became anxious and reported she needed pain meds and \"can't do this right now\" and had 1 LOB with sudden change in direction needing Diego to correct. Pt sat in recliner and with encouragement was able to perform LE ther ex but limited with LLE d/t pain. Donned ice to L shoulder and L hip for further pain relief as pt unable to receive pain meds for 45 min. Pt performed 2 sit<>stand transfers from recliner with Diego this date with maxVC's for sequencing. Pt left to sit up in recliner. Placed several pillows for inc comfort. Call light and chair alarm in place. Continue with POC.  Activity Tolerance: Patient limited by pain;Patient limited by endurance    Plan  Physical Therapy Plan  General Plan: 5-7 times per week  Current Treatment Recommendations: Strengthening;Balance training;Functional mobility training;Transfer training;Endurance training;Pain management;Stair training;Gait training;Home exercise program;Therapeutic activities    Restrictions  Restrictions/Precautions  Restrictions/Precautions: Fall Risk, ROM Restrictions, Weight Bearing  Activity Level: Up with Assist  Required Braces or 
Occupational Therapy  Facility/Department: [facility] MED SURG UNIT  Daily Treatment Note  NAME: Ivy Read  : 1940  MRN: [id number]    Date of Service: 6/3/2025    Discharge Recommendations:  Subacute/Skilled Nursing Facility, 24 hour supervision or assist (slow snf)     Treatment Diagnosis: Decreased ADL/IADL performance d/t L shoulder fx     Patient Diagnosis(es): The primary encounter diagnosis was Encounter for rehabilitation. A diagnosis of Closed fracture of proximal end of left humerus, unspecified fracture morphology, initial encounter was also pertinent to this visit.     Assessment    OT follow up:yes   Pt. Tolerated 5 sit to stands from recliner with max vc's for directions and instructions to promote transfer techniques and increase RUE/LB strength. Pt.'s first 2 transfers were Mod A and progressed to Min A for the last 3 doing better understanding of techniques. Pt. Tolerated RUE exercises in all planes and directions to increase UB strength for transfers and functional tasks at hand. Trained and educated pt. In donning/doffing sling extensively. Pt. Is impulsive and required max vc's for attention to task. During training and education pt. Had difficulty focusing on task at hand and was worrying about other things and required max vc's for re-direction. Pt. Tolerated OT session well. Anwered pt.'s questions and concerns.  Plan   Occupational Therapy Plan  Times Per Week: 4-7  Times Per Day: Once a day  Current Treatment Recommendations: Strengthening, Functional mobility training, Endurance training, Safety education & training, Patient/Caregiver education & training, Co-Treatment, Self-Care / ADL, Positioning, Equipment evaluation, education, & procurement, Pain managemen       Restrictions   Restrictions/Precautions  Restrictions/Precautions: Fall Risk, ROM Restrictions, Weight Bearing  Activity Level: Up with Assist  Required Braces or Orthoses?: Yes (Sling AAT)  Upper Extremity Weight Bearing 
Occupational Therapy  Facility/Department: [facility] MED SURG UNIT  Daily Treatment Note  NAME: Ivy Read  : 1940  MRN: [id number]    Date of Service: 2025    Discharge Recommendations:   Subacute/Skilled Nursing Facility, 24 hour supervision or assist (slow snf)   Treatment Diagnosis: Decreased ADL/IADL performance d/t L shoulder fx       Patient Diagnosis(es): The primary encounter diagnosis was Encounter for rehabilitation. A diagnosis of Closed fracture of proximal end of left humerus, unspecified fracture morphology, initial encounter was also pertinent to this visit.     Assessment    OT follow up:yes  Pt. Stated she is staying here for rehab so staff can do everything for her. DOSS educated to pt. She needs to do as much as she can for herself and staff will be more than happy to assist her. Pt. Tolerated a sponge bath this day with warm wipes per her request. Pt. Required extra time due to slow movements and max vc's for encouragement. Pt. Consistently said \"I can't do that\" throughout OT session without trying. DOSS had to give extensive explaining and recommendations for easier techniques. Recommended to pt. To wear or have family bring her clothes that shirts open in the front for easier clothing management. Pt. Stated she does not have shirts like that, they all go over her head. Educated to pt. That is fine, but will be much harder for UB dressing. Pt. Had difficulty following commands because while being trained in dressing techniques pt. Was worried about other things and telling staff what to do. Pt. Required mod vc's for re-direction for attention to task. Pt.'s ADL status is listed below. Continue therapy and education. Answered pt.'s questions and concerns.     Plan   Occupational Therapy Plan  Times Per Week: 4-7  Times Per Day: Once a day  Current Treatment Recommendations: Strengthening, Functional mobility training, Endurance training, Safety education & training, Patient/Caregiver 
Patient alert and oriented, up to the bathroom frequently. Pt was incontinent of stool on the way to the bathroom. Pt has hemorrhoids that are slightly bleeding. Pt is in chair with left arm sling in place. Call light in reach.   
Persistent pain in the left shoulder.  Patient requested Afrin nasal spray and also requesting eye artificial tear refresher.      General appearance: alert, appears stated age and cooperative, no apparent distress.  Skin: Skin color, texture, turgor normal. No rashes or lesions  HEENT: Head: Normocephalic, no lesions, without obvious abnormality.  Neck: no adenopathy, no carotid bruit, no JVD, supple, symmetrical, trachea midline, and thyroid not enlarged, symmetric, no tenderness/mass/nodules  Lungs: clear to auscultation bilaterally  Heart: regular rate and rhythm, S1, S2 normal, no murmur, click, rub or gallop  Abdomen: soft, non-tender; bowel sounds normal; no masses,  no organomegaly  Extremities: extremities normal, atraumatic, no cyanosis or edema tender left proximal humerus.  Neurologic: Mental status: Alert, oriented, thought content appropriate       *Left proximal humeral fracture.  Patient was seen by orthopedic physician Dr. Peterson on 5/30.  Please refer to his note for details.  They recommended nonoperative approach.  He recommended an arm sling and to follow-up with him in 4 weeks.  Recommended nonweightbearing of the left shoulder but patient could exercise her elbow, wrist and hand.  He is not planning for any additional inpatient care therefore inpatient consultation is not warranted as a documented.     *Functional impairment  PT OT eval and treatment.  Likely patient may need skilled care if she is in agreement.     *Suspect vitamin D deficiency and osteoporosis.  Requested vitamin D level.  Start the patient on vitamin D supplementation, continue calcium supplementation.  Recommend DEXA scan in the outpatient setting to be handled and arranged by PCP.  Patient is on Fosamax.     *Hypothyroidism.  TSH is 8.  Increased Synthroid up to 75 mcg daily.     *Hypertension, fair control.  Continue preadmission BP meds.     *Multiple other medical issues not listed above.  To be addressed when time and 
Physical Therapy  Facility/Department: [facility] MED SURG UNIT  Daily Treatment Note  NAME: Ivy Read  : 1940  MRN: [id number]    Date of Service: 2025    Discharge Recommendations:  Continue to assess pending progress, Subacute/Skilled Nursing Facility, Home with Home health PT, Home with assist PRN        Patient Diagnosis(es): The primary encounter diagnosis was Encounter for rehabilitation. A diagnosis of Closed fracture of proximal end of left humerus, unspecified fracture morphology, initial encounter was also pertinent to this visit.    Assessment  Assessment: (P) Pt. progressing slowly with function. Pt. willing to participate and cooperate with each task. Pt. appears to struggle with engaging L LE and currently L UE in sling per protocol for NWB precautions. Pt. demo's mild unsteadiness with tsf's and needs min assistance to complete safely. Pt. able to ambulate slight more distance this a.m. Trialed stair training. Pt. required moderate assistance and max cues for training. Pt. may benefit with more time at slower SNF to assist with rehabilition due to level of difficulty and assistance needed to reduce risk of incident. Pt. needs AAROM with L hip ther ex. Pt. able to complete all other LE ther ex with AROM for strengthening. Assisted pt in recliner post.  Activity Tolerance: Patient limited by pain;Patient limited by endurance    Plan  Physical Therapy Plan  General Plan: 5-7 times per week (1-2)  Current Treatment Recommendations: Strengthening;Balance training;Functional mobility training;Transfer training;Endurance training;Pain management;Stair training;Gait training;Home exercise program;Therapeutic activities    Restrictions  Restrictions/Precautions  Restrictions/Precautions: Fall Risk, ROM Restrictions, Weight Bearing  Activity Level: Up with Assist  Required Braces or Orthoses?: Yes (sling AAT)  Upper Extremity Weight Bearing Restrictions  Left Upper Extremity Weight Bearing: Non 
Spoke to patients son and daughter in law via phone related to their questions with discharge planning. Reviewed that she did complete her PT and OT evals. Discussed recommendations on evals. They received list of options for SNF on Friday. They are considering [facility] versus a slower paced. Reports mother may be interested in [facility] if slow SNF is needed. WE discussed freedom of choice and I answered their questions.     They will be in this afternoon to visit pt and discuss options.    Zee Bradley RN    
Impaired  Transfer Training  Transfer Training: Yes  Overall Level of Assistance: Modified independent  Interventions: Safety awareness training;Tactile cues;Verbal cues;Visual cues  Sit to Stand: Minimal assistance;Partial/Moderate assistance  Stand to Sit: Stand by assistance;Contact guard assistance  Stand Pivot Transfers: Contact guard assistance;Minimal assistance;Partial/Moderate assistance  Gait Training  Gait Training: Yes  Right Side Weight Bearing: As tolerated  Left Side Weight Bearing:  (UE sling NWB)  Gait  Gait Training: Yes  Left Side Weight Bearing:  (UE sling NWB)  Right Side Weight Bearing: As tolerated  Overall Level of Assistance: Minimal assistance;Partial/Moderate assistance  Distance (ft): 15 Feet  Assistive Device: Cane, straight  Base of Support: Center of gravity altered  Speed/Emilie: Delayed;Pace decreased (< 100 feet/min);Fluctuations  Gait Abnormalities: Antalgic     PT Exercises  A/AROM Exercises: Hip extension x 10               Goals  Short Term Goals  Time Frame for Short Term Goals: 2-5 days  Short Term Goal 1: Min A with bed mobility  Short Term Goal 2: CGA with transfers  Short Term Goal 3: Pt able to ambulate with AD for 50' x 2 with CGA and safe gait pattern  Short Term Goal 4: Pt able to ambulate up/down 2 steps with Min A for safe entry into her home  Short Term Goal 5: Pt able to tolerate 10 reps of B LE ther ex to build up pts strength  Long Term Goals  Time Frame for Long Term Goals : same as STGs  Patient Goals   Patient Goals : To be able to get better and use her left arm    Education  Patient Education  Education Given To: Patient  Education Provided: Home Exercise Program  Education Method: Demonstration    AM-PAC - Mobility              Therapy Time   Individual Concurrent Group Co-treatment   Time In 0130         Time Out 0200         Minutes 30                 Fuentes Silveira, PTA           
left arm    Education       AM-PAC - Mobility              Therapy Time   Individual Concurrent Group Co-treatment   Time In  120         Time Out  205         Minutes  45                 Fran Mckeon, PTA .[id number]

## 2025-06-04 NOTE — CARE COORDINATION
This LSW spoke with patient's son this morning regarding SNF's.  Matthew has a pre-cert pending with [facility], and [facility] did call and say they could not accept patient.  Per patient's son, we will set up transportation to [facility] when patient is approved to go.  Electronically signed by JELLY Maxwell on 6/4/25 at 1:38 PM EDT

## 2025-06-05 ENCOUNTER — HOME CARE VISIT (OUTPATIENT)
Dept: HOME HEALTH SERVICES | Facility: HOME HEALTH | Age: 85
End: 2025-06-05
Payer: MEDICARE

## 2025-06-05 DIAGNOSIS — S42.202A CLOSED FRACTURE OF PROXIMAL END OF LEFT HUMERUS, UNSPECIFIED FRACTURE MORPHOLOGY, INITIAL ENCOUNTER: ICD-10-CM

## 2025-06-05 RX ORDER — OXYCODONE HYDROCHLORIDE 5 MG/1
5 TABLET ORAL EVERY 4 HOURS PRN
Qty: 84 TABLET | Refills: 0 | Status: SHIPPED | OUTPATIENT
Start: 2025-06-05 | End: 2025-06-19

## 2025-06-06 RX ORDER — ACETAMINOPHEN 325 MG/1
650 TABLET ORAL EVERY 4 HOURS PRN
COMMUNITY

## 2025-06-06 RX ORDER — DOCUSATE SODIUM 100 MG/1
100 CAPSULE, LIQUID FILLED ORAL DAILY PRN
COMMUNITY

## 2025-06-06 RX ORDER — POLYETHYLENE GLYCOL 3350 17 G/17G
17 POWDER, FOR SOLUTION ORAL DAILY
COMMUNITY

## 2025-06-06 RX ORDER — ANTIOX #8/OM3/DHA/EPA/LUT/ZEAX 250-2.5 MG
1 CAPSULE ORAL 2 TIMES DAILY
COMMUNITY
End: 2025-06-06 | Stop reason: SDUPTHER

## 2025-06-09 ENCOUNTER — OFFICE VISIT (OUTPATIENT)
Dept: GERIATRIC MEDICINE | Age: 85
End: 2025-06-09

## 2025-06-09 ENCOUNTER — APPOINTMENT (OUTPATIENT)
Dept: ORTHOPEDIC SURGERY | Facility: CLINIC | Age: 85
End: 2025-06-09
Payer: MEDICARE

## 2025-06-09 DIAGNOSIS — G25.81 RESTLESS LEGS SYNDROME (RLS): ICD-10-CM

## 2025-06-09 DIAGNOSIS — S42.295D OTHER CLOSED NONDISPLACED FRACTURE OF PROXIMAL END OF LEFT HUMERUS WITH ROUTINE HEALING, SUBSEQUENT ENCOUNTER: Primary | ICD-10-CM

## 2025-06-09 DIAGNOSIS — R26.81 UNSTEADINESS: ICD-10-CM

## 2025-06-09 DIAGNOSIS — I10 ESSENTIAL HYPERTENSION: Chronic | ICD-10-CM

## 2025-06-09 DIAGNOSIS — R53.1 WEAKNESS: ICD-10-CM

## 2025-06-09 DIAGNOSIS — E03.9 HYPOTHYROIDISM, UNSPECIFIED TYPE: Chronic | ICD-10-CM

## 2025-06-11 ENCOUNTER — TELEPHONE (OUTPATIENT)
Age: 85
End: 2025-06-11

## 2025-06-11 ENCOUNTER — OFFICE VISIT (OUTPATIENT)
Dept: GERIATRIC MEDICINE | Age: 85
End: 2025-06-11

## 2025-06-11 DIAGNOSIS — S42.202A CLOSED FRACTURE OF PROXIMAL END OF LEFT HUMERUS, UNSPECIFIED FRACTURE MORPHOLOGY, INITIAL ENCOUNTER: Primary | ICD-10-CM

## 2025-06-11 DIAGNOSIS — J45.40 MODERATE PERSISTENT ASTHMA WITHOUT COMPLICATION: Chronic | ICD-10-CM

## 2025-06-11 DIAGNOSIS — I10 ESSENTIAL HYPERTENSION: Chronic | ICD-10-CM

## 2025-06-11 DIAGNOSIS — E03.9 HYPOTHYROIDISM, UNSPECIFIED TYPE: Chronic | ICD-10-CM

## 2025-06-11 NOTE — TELEPHONE ENCOUNTER
Patients son called and asked if he could be on a virtual call with his mothers appointment on 6/19 please advise

## 2025-06-16 ASSESSMENT — ENCOUNTER SYMPTOMS: CONSTIPATION: 1

## 2025-06-16 NOTE — PROGRESS NOTES
[address] [address]    6/11/2025    Ivy Read  is a 85 y.o. in the  being seen for a f/u of   Chief Complaint   Patient presents with    Follow-up       Ivy Read is a 85-year-old female recently admitted to local nursing home for rehab.  Patient recently evaluated in the hospital after she fell and was found to have a left proximal humeral fracture.  Patient evaluated by orthopedics who recommended nonoperative approach.  Patient was unable to take care of herself at home as she is left-handed and was admitted for short-term therapy in the hospital.  Patient was then discharged to SNF for further rehab.  Patient with past medical history of hypertension hypothyroidism.    At time of visit patient is resting in room.  She is alert and oriented x 3.  Sling in place to left arm.  Patient reports therapy is going well but she is still unable to go from sitting to standing on her own.  Patient reports pain at this time but she has not received her pain medication.  Patient states that the oxycodone was making her hallucinate and so she has only been taking ibuprofen.  She denies nausea vomiting diarrhea.  She does report some constipation and states her last bowel movement was yesterday.  No recent fever or fall reported.            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    Fibrocystic disease of right breast     Generalized osteoarthritis 08/04/2014    GERD (gastroesophageal reflux disease)     History of breast cancer in female 08/04/2014    History of colon polyps 05/23/2022    HTN (hypertension) 08/04/2014    Hyperlipidemia

## 2025-06-18 ENCOUNTER — OFFICE VISIT (OUTPATIENT)
Dept: GERIATRIC MEDICINE | Age: 85
End: 2025-06-18

## 2025-06-18 DIAGNOSIS — S42.202A CLOSED FRACTURE OF PROXIMAL END OF LEFT HUMERUS, UNSPECIFIED FRACTURE MORPHOLOGY, INITIAL ENCOUNTER: ICD-10-CM

## 2025-06-18 DIAGNOSIS — J45.40 MODERATE PERSISTENT ASTHMA WITHOUT COMPLICATION: Primary | ICD-10-CM

## 2025-06-18 ASSESSMENT — ENCOUNTER SYMPTOMS
COUGH: 1
CONSTIPATION: 1

## 2025-06-18 NOTE — PROGRESS NOTES
Patient Name: Ivy Read     YOB: 1940  Medical Record Number: [id number]       History of Present Illness:  This 85-year-old woman admitted here after recent hospitalization.  Patient has suffered a recent fall with left proximal humeral fracture patient was seen by orthopedics patient did not require surgical intervention was treated conservatively.  Patient has had functional decline while at home unable to maintain their separately transferred here for course of rehabilitation prior to return back to community patient is pain-free at this time.  Function stable without new lightheadedness.  Intermittent constipation at this time.    Review of Systems   Constitutional:  Positive for activity change and appetite change.   HENT:  Negative for congestion.    Respiratory:  Positive for cough.    Cardiovascular:  Positive for leg swelling.   Gastrointestinal:  Positive for constipation.   Musculoskeletal:  Positive for arthralgias and myalgias.   Neurological:  Positive for weakness.   Hematological:  Bruises/bleeds easily.   All other systems reviewed and are negative.      Review of Systems: All 14 review of systems negative other than as stated above    Social History     Tobacco Use    Smoking status: Former     Current packs/day: 0.00     Average packs/day: 1.5 packs/day for 21.0 years (31.5 ttl pk-yrs)     Types: Cigarettes     Start date:      Quit date: 1980     Years since quittin.4    Smokeless tobacco: Never   Substance Use Topics    Alcohol use: Yes     Comment: infrequent    Drug use: No         Past Medical History:   Diagnosis Date    Abnormal ultrasound of breast     Abscess of abdominal wall 2016    Bilateral carpal tunnel syndrome 2018    Bleeding hemorrhoid 2015    Breast cancer (HCC)     Breast cancer, right (HCC)     s/p partial mastectomy, radiation    Carbuncle of abdominal wall 2016    Ductal carcinoma in situ of breast     right

## 2025-06-19 ENCOUNTER — SCHEDULED TELEPHONE ENCOUNTER (OUTPATIENT)
Age: 85
End: 2025-06-19
Payer: MEDICARE

## 2025-06-19 ENCOUNTER — HOSPITAL ENCOUNTER (OUTPATIENT)
Dept: ORTHOPEDIC SURGERY | Age: 85
Discharge: HOME | End: 2025-06-21
Payer: MEDICARE

## 2025-06-19 DIAGNOSIS — S42.292D OTHER CLOSED DISPLACED FRACTURE OF PROXIMAL END OF LEFT HUMERUS WITH ROUTINE HEALING, SUBSEQUENT ENCOUNTER: Primary | ICD-10-CM

## 2025-06-19 DIAGNOSIS — S42.292A OTHER CLOSED DISPLACED FRACTURE OF PROXIMAL END OF LEFT HUMERUS, INITIAL ENCOUNTER: ICD-10-CM

## 2025-06-19 PROCEDURE — 99214 OFFICE O/P EST MOD 30 MIN: CPT | Performed by: STUDENT IN AN ORGANIZED HEALTH CARE EDUCATION/TRAINING PROGRAM

## 2025-06-19 PROCEDURE — 73030 X-RAY EXAM OF SHOULDER: CPT

## 2025-06-19 PROCEDURE — 1159F MED LIST DOCD IN RCRD: CPT | Performed by: STUDENT IN AN ORGANIZED HEALTH CARE EDUCATION/TRAINING PROGRAM

## 2025-06-19 PROCEDURE — 1123F ACP DISCUSS/DSCN MKR DOCD: CPT | Performed by: STUDENT IN AN ORGANIZED HEALTH CARE EDUCATION/TRAINING PROGRAM

## 2025-06-19 NOTE — PROGRESS NOTES
Subjective:      HPI:: Ivy Read is a 85 y.o. female who presents today for follow-up of her left shoulder.  She is undergoing closed treatment of a proximal left humerus fracture.  She has had some improvements in her pain.  Still has soreness at the fracture site.  She has been using a sling.  She is staying at a skilled nursing facility.  One of her sons is with her today.    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    Fibrocystic disease of right breast     Generalized osteoarthritis 08/04/2014    GERD (gastroesophageal reflux disease)     History of breast cancer in female 08/04/2014    History of colon polyps 05/23/2022    HTN (hypertension) 08/04/2014    Hyperlipidemia     Hypertension     Hypothyroidism 12/02/2016    Insomnia 06/16/2015    Macula lutea degeneration     Moderate persistent asthma 11/25/2020    Moderate persistent asthma 11/25/2020    MRSA (methicillin resistant Staphylococcus aureus) infection     MRSA infection 11/2016    LLQ abdominal wall    Obesity     Postmenopausal 02/09/2017    Pre-diabetes 07/23/2019    Scoliosis     Subclinical hypothyroidism 05/06/2014    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

## 2025-06-23 DIAGNOSIS — M81.0 AGE-RELATED OSTEOPOROSIS WITHOUT CURRENT PATHOLOGICAL FRACTURE: ICD-10-CM

## 2025-06-23 RX ORDER — ALENDRONATE SODIUM 70 MG/1
TABLET ORAL
Qty: 12 TABLET | Refills: 3 | Status: SHIPPED | OUTPATIENT
Start: 2025-06-23

## 2025-06-23 NOTE — TELEPHONE ENCOUNTER
Comments:     Last Office Visit (last PCP visit):   5/27/2025    Next Visit Date:  Future Appointments   Date Time Provider Department Center   7/29/2025 10:00 AM Peterson, Keanu, MD [facility]   9/12/2025  2:00 PM Chanel Brody MD [facility]       **If hasn't been seen in over a year OR hasn't followed up according to last diabetes/ADHD visit, make appointment for patient before sending refill to provider.    Rx requested:  Requested Prescriptions     Pending Prescriptions Disp Refills    alendronate (FOSAMAX) 70 MG tablet [Pharmacy Med Name: ALENDRONATE SODIUM 70 MG TAB] 12 tablet      Sig: TAKE 1 TABLET BY MOUTH ONE TIME PER WEEK

## 2025-06-25 ASSESSMENT — ENCOUNTER SYMPTOMS: COUGH: 1

## 2025-06-25 NOTE — PROGRESS NOTES
[facility]- [address] [place] [id number]    6/18/2025    Ivy Read  is a 85 y.o. in the  being seen for    Chief Complaint   Patient presents with    Follow-up       Remains at [facility] for rehab.  Patient sitting in room at time of visit.  Sling in place to left arm.  She denies any recent fever or fall.  Requip recently increased for restless legs.  No nausea vomiting diarrhea or constipation.  She does report dry cough which has improved with as needed cough syrup.          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    Fibrocystic disease of right breast     Generalized osteoarthritis 08/04/2014    GERD (gastroesophageal reflux disease)     History of breast cancer in female 08/04/2014    History of colon polyps 05/23/2022    HTN (hypertension) 08/04/2014    Hyperlipidemia     Hypertension     Hypothyroidism 12/02/2016    Insomnia 06/16/2015    Macula lutea degeneration     Moderate persistent asthma 11/25/2020    Moderate persistent asthma 11/25/2020    MRSA (methicillin resistant Staphylococcus aureus) infection     MRSA infection 11/2016    LLQ abdominal wall    Obesity     Postmenopausal 02/09/2017    Pre-diabetes 07/23/2019    Scoliosis     Subclinical hypothyroidism 05/06/2014    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

## 2025-06-26 PROBLEM — Z09 HOSPITAL DISCHARGE FOLLOW-UP: Status: RESOLVED | Noted: 2025-05-27 | Resolved: 2025-06-26

## 2025-07-21 ENCOUNTER — OFFICE VISIT (OUTPATIENT)
Dept: FAMILY MEDICINE CLINIC | Age: 85
End: 2025-07-21

## 2025-07-21 VITALS
HEIGHT: 66 IN | BODY MASS INDEX: 30.47 KG/M2 | DIASTOLIC BLOOD PRESSURE: 66 MMHG | OXYGEN SATURATION: 100 % | SYSTOLIC BLOOD PRESSURE: 126 MMHG | HEART RATE: 71 BPM | WEIGHT: 189.6 LBS | TEMPERATURE: 97.8 F

## 2025-07-21 DIAGNOSIS — R09.82 POST-NASAL DRAINAGE: ICD-10-CM

## 2025-07-21 DIAGNOSIS — Z87.81 HISTORY OF HUMERUS FRACTURE: ICD-10-CM

## 2025-07-21 DIAGNOSIS — E78.2 MIXED HYPERLIPIDEMIA: Chronic | ICD-10-CM

## 2025-07-21 DIAGNOSIS — L30.4 INTERTRIGO: ICD-10-CM

## 2025-07-21 DIAGNOSIS — I10 ESSENTIAL HYPERTENSION: Chronic | ICD-10-CM

## 2025-07-21 DIAGNOSIS — D50.8 OTHER IRON DEFICIENCY ANEMIA: Primary | ICD-10-CM

## 2025-07-21 DIAGNOSIS — R73.03 PRE-DIABETES: ICD-10-CM

## 2025-07-21 PROBLEM — R53.1 WEAKNESS: Status: RESOLVED | Noted: 2025-05-30 | Resolved: 2025-07-21

## 2025-07-21 PROBLEM — S42.352G: Status: RESOLVED | Noted: 2025-05-27 | Resolved: 2025-07-21

## 2025-07-21 PROBLEM — K59.03 DRUG-INDUCED CONSTIPATION: Status: RESOLVED | Noted: 2025-05-27 | Resolved: 2025-07-21

## 2025-07-21 PROBLEM — K64.9 ACUTE HEMORRHOID: Status: RESOLVED | Noted: 2025-05-27 | Resolved: 2025-07-21

## 2025-07-21 PROBLEM — E66.9 OBESITY: Status: RESOLVED | Noted: 2020-10-15 | Resolved: 2025-07-21

## 2025-07-21 PROBLEM — R04.0 EPISTAXIS: Status: RESOLVED | Noted: 2025-05-06 | Resolved: 2025-07-21

## 2025-07-21 RX ORDER — IPRATROPIUM BROMIDE 42 UG/1
2 SPRAY, METERED NASAL 3 TIMES DAILY
Qty: 45 ML | Refills: 1 | Status: SHIPPED | OUTPATIENT
Start: 2025-07-21

## 2025-07-21 ASSESSMENT — ENCOUNTER SYMPTOMS
CONSTIPATION: 0
VOICE CHANGE: 0
DIARRHEA: 0
SHORTNESS OF BREATH: 0
COLOR CHANGE: 0
BACK PAIN: 0
NAUSEA: 0
ABDOMINAL PAIN: 0
TROUBLE SWALLOWING: 0
EYE PAIN: 0
BLOOD IN STOOL: 0
CHEST TIGHTNESS: 0

## 2025-07-21 NOTE — PROGRESS NOTES
[facility]  [address]   [address]  [place] [address]  Dept: [phone]  Dept Fax: [phone]  Loc: [phone]     7/21/2025    Visit type: Follow up    The patient (or guardian, if applicable) and other individuals in attendance with the patient were advised that Artificial Intelligence will be utilized during this visit to record, process the conversation to generate a clinical note, and support improvement of the AI technology. The patient (or guardian, if applicable) and other individuals in attendance at the appointment consented to the use of AI, including the recording.      Reason for Visit: Follow-Up from Hospital (05/25 Per note Patient fell down and suffered left proximal humeral fracture. Admit her to the hospital under observation and facilitate for short-term skilled care - 07/12/25. She would like Physical therapy. Arm is still sore does stretching at home. Does have a follow up appt with Dr. Peterson on 07/29/25. ), Skin Problem (Skin is dry/ itchy. Does have some swelling to her ankles and feet. No pain. ), and Constipation (Continues on the Colace. )       ASSESSMENT/PLAN     Assessment & Plan  1. Humerus fracture.  Recent history of, recently discharged from the nursing home only conservative management  - Reports ongoing soreness and limited range of motion in the affected arm.  - Performing some exercises at home but has not yet started formal physical therapy.  - Referral for home health and physical therapy will be made to facilitate recovery.  - Advised to continue current exercises and use the other arm to assist in lifting the affected arm. Contact the office if there are any issues with physical therapy or home health services.    2. Rash.  - Reports a slight rash and itching underneath the belly.  - Prescription for a topical cream to be applied twice daily will be provided.  - Advised to call the office if a refill is needed

## 2025-07-23 ENCOUNTER — TELEPHONE (OUTPATIENT)
Dept: FAMILY MEDICINE CLINIC | Age: 85
End: 2025-07-23

## 2025-07-23 NOTE — TELEPHONE ENCOUNTER
NELLI to PCP:    [facility] contacted the office in regards to the referral that was placed on 7/21/25. [facility] stated they spoke with the patient and she is already established with a home health care facility and are happy with the care being given and would like to stick with them.  Patient is established with [facility].     [facility]  Office Number: [phone]

## 2025-07-29 ENCOUNTER — HOSPITAL ENCOUNTER (OUTPATIENT)
Age: 85
Discharge: HOME | End: 2025-07-31
Payer: MEDICARE

## 2025-07-29 ENCOUNTER — HOSPITAL ENCOUNTER (OUTPATIENT)
Dept: GENERAL RADIOLOGY | Age: 85
Discharge: HOME | End: 2025-07-31
Payer: MEDICARE

## 2025-07-29 ENCOUNTER — OFFICE VISIT (OUTPATIENT)
Dept: ORTHOPEDIC SURGERY | Age: 85
End: 2025-07-29
Payer: MEDICARE

## 2025-07-29 VITALS
TEMPERATURE: 98.1 F | HEART RATE: 77 BPM | HEIGHT: 66 IN | BODY MASS INDEX: 30.37 KG/M2 | OXYGEN SATURATION: 96 % | WEIGHT: 189 LBS

## 2025-07-29 DIAGNOSIS — S42.292D OTHER CLOSED DISPLACED FRACTURE OF PROXIMAL END OF LEFT HUMERUS WITH ROUTINE HEALING, SUBSEQUENT ENCOUNTER: ICD-10-CM

## 2025-07-29 DIAGNOSIS — S42.292D OTHER CLOSED DISPLACED FRACTURE OF PROXIMAL END OF LEFT HUMERUS WITH ROUTINE HEALING, SUBSEQUENT ENCOUNTER: Primary | ICD-10-CM

## 2025-07-29 PROCEDURE — 99214 OFFICE O/P EST MOD 30 MIN: CPT | Performed by: STUDENT IN AN ORGANIZED HEALTH CARE EDUCATION/TRAINING PROGRAM

## 2025-07-29 PROCEDURE — 73030 X-RAY EXAM OF SHOULDER: CPT

## 2025-07-29 PROCEDURE — 1159F MED LIST DOCD IN RCRD: CPT | Performed by: STUDENT IN AN ORGANIZED HEALTH CARE EDUCATION/TRAINING PROGRAM

## 2025-07-29 PROCEDURE — 1123F ACP DISCUSS/DSCN MKR DOCD: CPT | Performed by: STUDENT IN AN ORGANIZED HEALTH CARE EDUCATION/TRAINING PROGRAM

## 2025-07-29 PROCEDURE — 1125F AMNT PAIN NOTED PAIN PRSNT: CPT | Performed by: STUDENT IN AN ORGANIZED HEALTH CARE EDUCATION/TRAINING PROGRAM

## 2025-07-29 NOTE — PROGRESS NOTES
Subjective:      HPI:: Ivy Read is a 85 y.o. female who presents today for follow-up evaluation of her left shoulder.  She has undergone closed treatment of a left proximal humerus fracture.  She notes that she has had noticeable improvement in pain but still has residual stiffness in the left shoulder.  They have had a hard time getting home therapy set up.  She is with one of her sons today.  We discussed at this point progressing her range of motion and exercise program.  We discussed the use of bands and things that she can do around the house.     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    Fibrocystic disease of right breast     Generalized osteoarthritis 08/04/2014    GERD (gastroesophageal reflux disease)     History of breast cancer in female 08/04/2014    History of colon polyps 05/23/2022    HTN (hypertension) 08/04/2014    Hyperlipidemia     Hypertension     Hypothyroidism 12/02/2016    Insomnia 06/16/2015    Macula lutea degeneration     Moderate persistent asthma 11/25/2020    Moderate persistent asthma 11/25/2020    MRSA (methicillin resistant Staphylococcus aureus) infection     MRSA infection 11/2016    LLQ abdominal wall    Obesity     Postmenopausal 02/09/2017    Pre-diabetes 07/23/2019    Scoliosis     Subclinical hypothyroidism 05/06/2014    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

## 2025-07-30 DIAGNOSIS — G25.81 RESTLESS LEGS SYNDROME (RLS): ICD-10-CM

## 2025-07-30 RX ORDER — ROPINIROLE 0.25 MG/1
0.25 TABLET, FILM COATED ORAL 3 TIMES DAILY
Qty: 270 TABLET | Refills: 0 | Status: SHIPPED | OUTPATIENT
Start: 2025-07-30

## 2025-07-30 NOTE — TELEPHONE ENCOUNTER
Comments:     Last Office Visit (last PCP visit):   7/21/2025    Next Visit Date:  Future Appointments   Date Time Provider Department Center   9/12/2025  2:00 PM Chanel Brody MD [facility]   10/24/2025 12:30 PM Kaitlin Marcos MD [facility]   10/31/2025 10:30 AM Peterson, Keanu, MD [facility]       **If hasn't been seen in over a year OR hasn't followed up according to last diabetes/ADHD visit, make appointment for patient before sending refill to provider.    Rx requested:  Requested Prescriptions     Pending Prescriptions Disp Refills    rOPINIRole (REQUIP) 0.25 MG tablet [Pharmacy Med Name: ROPINIROLE HCL 0.25 MG TABLET] 270 tablet 1     Sig: TAKE 1 TABLET BY MOUTH THREE TIMES A DAY

## 2025-08-26 DIAGNOSIS — S42.292D OTHER CLOSED DISPLACED FRACTURE OF PROXIMAL END OF LEFT HUMERUS WITH ROUTINE HEALING, SUBSEQUENT ENCOUNTER: Primary | ICD-10-CM

## 2025-09-03 ENCOUNTER — HOSPITAL ENCOUNTER (OUTPATIENT)
Dept: PHYSICAL THERAPY | Age: 85
Setting detail: THERAPIES SERIES
Discharge: HOME | End: 2025-09-03
Attending: STUDENT IN AN ORGANIZED HEALTH CARE EDUCATION/TRAINING PROGRAM
Payer: MEDICARE

## 2025-09-03 PROCEDURE — 97162 PT EVAL MOD COMPLEX 30 MIN: CPT

## 2025-09-03 PROCEDURE — 97110 THERAPEUTIC EXERCISES: CPT

## 2025-09-05 DIAGNOSIS — G25.81 RESTLESS LEGS SYNDROME (RLS): ICD-10-CM

## 2025-09-05 RX ORDER — ROPINIROLE 0.25 MG/1
0.5 TABLET, FILM COATED ORAL 3 TIMES DAILY
Qty: 270 TABLET | Refills: 0 | Status: SHIPPED | OUTPATIENT
Start: 2025-09-05

## (undated) DEVICE — HAND II: Brand: MEDLINE INDUSTRIES, INC.

## (undated) DEVICE — ADHESIVE SKIN CLSR 0.7ML TOP DERMBND ADV

## (undated) DEVICE — SINGLE PORT MANIFOLD: Brand: NEPTUNE 2

## (undated) DEVICE — GAUZE,SPONGE,4"X4",16PLY,XRAY,STRL,LF: Brand: MEDLINE

## (undated) DEVICE — SET KNF FOR MINI CRPL TUNN REL SYS SFGRD 5PK

## (undated) DEVICE — SLING ORTH BCKL CLOSURE MED UNISX ARM W/ PD LARGER PCH

## (undated) DEVICE — TROCAR: Brand: KII FIOS FIRST ENTRY

## (undated) DEVICE — Device

## (undated) DEVICE — DEVICE TRCR 12X9X3IN WHT CLSR DISP OMNICLOSE

## (undated) DEVICE — CHLORAPREP 26ML ORANGE

## (undated) DEVICE — TISSUE RETRIEVAL SYSTEM: Brand: INZII RETRIEVAL SYSTEM

## (undated) DEVICE — POUCH, INSTRUMENT, 3POCKET, INVISISHIELD: Brand: MEDLINE

## (undated) DEVICE — GLOVE SURG SZ 85 L12IN FNGR THK94MIL TRNSLUC YEL LTX

## (undated) DEVICE — PACK,BASIC IV,SIRUS: Brand: MEDLINE

## (undated) DEVICE — LABEL MED MINI W/ MARKER

## (undated) DEVICE — TRAY PREP DRY W/ PREM GLV 2 APPL 6 SPNG 2 UNDPD 1 OVERWRAP

## (undated) DEVICE — DRAPE EQUIP TRNSPRT CONTAINMENT FOR BK TAB

## (undated) DEVICE — COVER,TABLE,44X90,STERILE: Brand: MEDLINE

## (undated) DEVICE — SMARTGOWN BREATHABLE SPECIALTY GOWN: Brand: CONVERTORS

## (undated) DEVICE — INTENDED FOR TISSUE SEPARATION, AND OTHER PROCEDURES THAT REQUIRE A SHARP SURGICAL BLADE TO PUNCTURE OR CUT.: Brand: BARD-PARKER ® CARBON RIB-BACK BLADES

## (undated) DEVICE — ELECTRODE PT RET AD L9FT HI MOIST COND ADH HYDRGEL CORDED

## (undated) DEVICE — GLOVE ORANGE PI 7 1/2   MSG9075

## (undated) DEVICE — SUPER SPONGES,MEDIUM: Brand: KERLIX

## (undated) DEVICE — GOWN,AURORA,NONREINFORCED,LARGE: Brand: MEDLINE

## (undated) DEVICE — PADDING CAST N ADH 4 YDX2 IN HIGHLY ABSORBENT EZ APPL SOFROL

## (undated) DEVICE — SHEARS ENDOSCP HARM 36CM ULTRASONIC CRV TIP UPGRD

## (undated) DEVICE — NEEDLE INSUF L120MM ULT VERES ENDOPATH

## (undated) DEVICE — Z DUPLICATE USE 2431315 SET INSUF TBNG HI FLO W/ SMK EVAC FOR PNEUMOCLEAR

## (undated) DEVICE — SUTURE ETHLN SZ 5-0 L18IN NONABSORBABLE BLK L13MM P-3 3/8 698H

## (undated) DEVICE — COUNTER NDL 40 COUNT HLD 70 FOAM BLK ADH W/ MAG

## (undated) DEVICE — SUTURE VCRL SZ 0 L36IN ABSRB UD L36MM CT-1 1/2 CIR J946H

## (undated) DEVICE — 1010 S-DRAPE TOWEL DRAPE 10/BX: Brand: STERI-DRAPE™

## (undated) DEVICE — NEEDLE HYPO 22GA L1 1/2IN PIVOTING SHLD FOR LUERLOCK SYR

## (undated) DEVICE — TROCAR: Brand: KII® SLEEVE

## (undated) DEVICE — COVER LT HNDL BLU PLAS

## (undated) DEVICE — ZIMMER® STERILE DISPOSABLE TOURNIQUET CUFF, DUAL PORT, SINGLE BLADDER, 18 IN. (46 CM)

## (undated) DEVICE — DRESSING PETRO W3XL8IN N ADH KNIT CELOS ACETT ADPTC

## (undated) DEVICE — PADDING CAST W4INXL4YD HIGHLY ABSRB THAN COT EZ APPL

## (undated) DEVICE — WARMER SCP 2 ANTIFOG LAP DISP

## (undated) DEVICE — SPONGE,LAP,18"X18",DLX,XR,ST,5/PK,40/PK: Brand: MEDLINE

## (undated) DEVICE — SYRINGE IRRIG 60ML SFT PLIABLE BLB EZ TO GRP 1 HND USE W/

## (undated) DEVICE — BANDAGE ELASTIC COMPRSS REINF 2INX4.5YD

## (undated) DEVICE — MANIPULATOR UTER INSTRUMENT ZUMI

## (undated) DEVICE — APPLICATOR MEDICATED 26 CC SOLUTION HI LT ORNG CHLORAPREP

## (undated) DEVICE — TOTAL TRAY, DB, 100% SILI FOLEY, 16FR 10: Brand: MEDLINE

## (undated) DEVICE — LINER PAD CONTOUR SUPER PEACH 7X14IN

## (undated) DEVICE — DRAPE, LAVH, STERILE: Brand: MEDLINE

## (undated) DEVICE — GLOVE SURG SZ 9 THK91MIL LTX FREE SYN POLYISOPRENE ANTI

## (undated) DEVICE — SUTURE MCRYL SZ 4-0 L27IN ABSRB UD L19MM PS-2 1/2 CIR PRIM Y426H

## (undated) DEVICE — GLOVE ORANGE PI 8   MSG9080

## (undated) DEVICE — KIT,ANTI FOG,W/SPONGE & FLUID,SOFT PACK: Brand: MEDLINE